# Patient Record
Sex: FEMALE | Race: WHITE | NOT HISPANIC OR LATINO | Employment: OTHER | URBAN - METROPOLITAN AREA
[De-identification: names, ages, dates, MRNs, and addresses within clinical notes are randomized per-mention and may not be internally consistent; named-entity substitution may affect disease eponyms.]

---

## 2017-01-19 ENCOUNTER — ANTICOAGULATION THERAPY VISIT (OUTPATIENT)
Dept: NURSING | Facility: CLINIC | Age: 82
End: 2017-01-19
Payer: COMMERCIAL

## 2017-01-19 ENCOUNTER — TELEPHONE (OUTPATIENT)
Dept: FAMILY MEDICINE | Facility: CLINIC | Age: 82
End: 2017-01-19

## 2017-01-19 DIAGNOSIS — M51.379 DEGENERATION OF LUMBAR OR LUMBOSACRAL INTERVERTEBRAL DISC: Primary | ICD-10-CM

## 2017-01-19 DIAGNOSIS — E03.8 OTHER SPECIFIED HYPOTHYROIDISM: ICD-10-CM

## 2017-01-19 DIAGNOSIS — I26.99 PULMONARY EMBOLISM AND INFARCTION (H): ICD-10-CM

## 2017-01-19 DIAGNOSIS — Z79.01 LONG-TERM (CURRENT) USE OF ANTICOAGULANTS: Primary | ICD-10-CM

## 2017-01-19 LAB — INR POINT OF CARE: 4.2 (ref 0.86–1.14)

## 2017-01-19 PROCEDURE — 99207 ZZC NO CHARGE NURSE ONLY: CPT

## 2017-01-19 PROCEDURE — 85610 PROTHROMBIN TIME: CPT | Mod: QW

## 2017-01-19 PROCEDURE — 36416 COLLJ CAPILLARY BLOOD SPEC: CPT

## 2017-01-19 RX ORDER — LEVOTHYROXINE SODIUM 125 UG/1
62.5 TABLET ORAL DAILY
Qty: 15 TABLET | Refills: 0 | Status: SHIPPED | OUTPATIENT
Start: 2017-01-19 | End: 2017-01-20

## 2017-01-19 RX ORDER — LEVOTHYROXINE SODIUM 125 UG/1
62.5 TABLET ORAL DAILY
Qty: 15 TABLET | Refills: 0 | Status: SHIPPED | OUTPATIENT
Start: 2017-01-19 | End: 2017-01-19

## 2017-01-19 NOTE — TELEPHONE ENCOUNTER
PCP: Please see pended DME Walker Order.  Sign if appropriate.     I left VM on patient's home phone asking what location they would like the DME order to be faxed to.    Sasha Leija RN

## 2017-01-19 NOTE — MR AVS SNAPSHOT
Ham Marie   1/19/2017 3:00 PM   Anticoagulation Therapy Visit    Description:  89 year old female   Provider:   ANTICOAGULATION CLINIC   Department:   Nurse           INR as of 1/19/2017     Selected INR 4.2! (1/19/2017)      Anticoagulation Summary as of 1/19/2017     INR goal 2.0-3.0   Selected INR 4.2! (1/19/2017)   Full instructions 1/19: Hold; Otherwise 5 mg every day   Next INR check 2/2/2017    Indications   Long-term (current) use of anticoagulants [Z79.01] [Z79.01]  Pulmonary embolism and infarction (H) [I26.99]  Pulmonary embolism and infarction (H) (Resolved) [I26.99]         Your next Anticoagulation Clinic appointment(s)     Feb 02, 2017  3:30 PM   Anticoagulation Visit with  ANTICOAGULATION CLINIC   [facility]    [address]  [place] [address]   [phone]              Contact Numbers     Clinic Number:         January 2017 Details    Sun Mon Tue Wed Thu Fri Sat     1               2               3               4               5               6               7                 8               9               10               11               12               13               14                 15               16               17               18               19      Hold   See details      20      5 mg         21      5 mg           22      5 mg         23      5 mg         24      5 mg         25      5 mg         26      5 mg         27      5 mg         28      5 mg           29      5 mg         30      5 mg         31      5 mg              Date Details   01/19 This INR check               How to take your warfarin dose     To take:  5 mg Take 2 of the 2.5 mg tablets.    Hold Do not take your warfarin dose. See the Details table to the right for additional instructions.                February 2017 Details    Sun Mon Tue Wed Thu Fri Sat        1      5 mg         2            3               4                 5               6                7               8               9               10               11                 12               13               14               15               16               17               18                 19               20               21               22               23               24               25                 26               27               28                    Date Details   No additional details    Date of next INR:  2/2/2017         How to take your warfarin dose     To take:  5 mg Take 2 of the 2.5 mg tablets.

## 2017-01-19 NOTE — TELEPHONE ENCOUNTER
Reason for Call: Request for an order or referral:    Order or referral being requested: DME for a Walker    Date needed: within one week    Has the patient been seen by the PCP for this problem? NO    Additional comments: The patient and her family came in today and asked for a walker for the patient    Phone number Patient can be reached at:  Home number on file [phone] (home)    Best Time:  anytime    Can we leave a detailed message on this number?  YES    Call taken on 1/19/2017 at 2:51 PM by Razia North

## 2017-01-19 NOTE — TELEPHONE ENCOUNTER
Pended an order for the type of walker patient is requesting.      Patient has been scheduled with Dr Marie 3pm 2-2-17 (2 wks) followed by RANDY BLOCK---patient hasn't been taking Levothyroxine x 2 wks.  Ran out of the med.  Overdue for thyroid lab testing.  I made a notation in 2-2-17 appointment that patient will need thyroid lab tests.   Sent RX Levothyroxine to patient's pharmacy today so that she can resume the med prior to OV x 2 wks.      Criselda Barfield RN, BSN

## 2017-01-20 RX ORDER — LEVOTHYROXINE SODIUM 125 UG/1
62.5 TABLET ORAL DAILY
Qty: 15 TABLET | Refills: 0 | Status: SHIPPED | OUTPATIENT
Start: 2017-01-20 | End: 2017-02-02

## 2017-01-20 NOTE — TELEPHONE ENCOUNTER
Patient returned call.   Prefers to keep OV with PCP scheduled 2-2-17.    Will  and resume Levothyroxine.   Requests DME for walker be mailed to her home address.  Done.     Criselda Barfield RN, BSN

## 2017-01-20 NOTE — TELEPHONE ENCOUNTER
Called Pt to see if she is interested in coming in today for OV with PCP (he has a couple of openings so far this AM)  Looks like Triage had already left VM previously asking where Pt wants walker DME script faxed to.     Awaiting callback.     Shira Fernandez RN

## 2017-02-02 ENCOUNTER — ANTICOAGULATION THERAPY VISIT (OUTPATIENT)
Dept: NURSING | Facility: CLINIC | Age: 82
End: 2017-02-02
Payer: COMMERCIAL

## 2017-02-02 ENCOUNTER — OFFICE VISIT (OUTPATIENT)
Dept: FAMILY MEDICINE | Facility: CLINIC | Age: 82
End: 2017-02-02
Payer: COMMERCIAL

## 2017-02-02 DIAGNOSIS — I26.99 PULMONARY EMBOLISM AND INFARCTION (H): ICD-10-CM

## 2017-02-02 DIAGNOSIS — Z79.01 LONG-TERM (CURRENT) USE OF ANTICOAGULANTS: ICD-10-CM

## 2017-02-02 DIAGNOSIS — E03.8 OTHER SPECIFIED HYPOTHYROIDISM: Primary | ICD-10-CM

## 2017-02-02 DIAGNOSIS — E55.9 VITAMIN D DEFICIENCY: ICD-10-CM

## 2017-02-02 DIAGNOSIS — F43.22 ADJUSTMENT DISORDER WITH ANXIOUS MOOD: ICD-10-CM

## 2017-02-02 DIAGNOSIS — R35.0 URINARY FREQUENCY: ICD-10-CM

## 2017-02-02 DIAGNOSIS — Z79.01 LONG-TERM (CURRENT) USE OF ANTICOAGULANTS: Primary | ICD-10-CM
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
## 2017-02-02 LAB
BASOPHILS # BLD AUTO: 0 10E9/L (ref 0–0.2)
BASOPHILS NFR BLD AUTO: 0.4 %
DIFFERENTIAL METHOD BLD: ABNORMAL
EOSINOPHIL # BLD AUTO: 0.4 10E9/L (ref 0–0.7)
EOSINOPHIL NFR BLD AUTO: 4.5 %
ERYTHROCYTE [DISTWIDTH] IN BLOOD BY AUTOMATED COUNT: 14.8 % (ref 10–15)
HCT VFR BLD AUTO: 38.9 % (ref 35–47)
HGB BLD-MCNC: 13 G/DL (ref 11.7–15.7)
INR POINT OF CARE: 2.8 (ref 0.86–1.14)
LYMPHOCYTES # BLD AUTO: 2.7 10E9/L (ref 0.8–5.3)
LYMPHOCYTES NFR BLD AUTO: 32.9 %
MCH RBC QN AUTO: 33.2 PG (ref 26.5–33)
MCHC RBC AUTO-ENTMCNC: 33.4 G/DL (ref 31.5–36.5)
MCV RBC AUTO: 100 FL (ref 78–100)
MONOCYTES # BLD AUTO: 0.7 10E9/L (ref 0–1.3)
MONOCYTES NFR BLD AUTO: 8.9 %
NEUTROPHILS # BLD AUTO: 4.4 10E9/L (ref 1.6–8.3)
NEUTROPHILS NFR BLD AUTO: 53.3 %
PLATELET # BLD AUTO: 219 10E9/L (ref 150–450)
RBC # BLD AUTO: 3.91 10E12/L (ref 3.8–5.2)
WBC # BLD AUTO: 8.3 10E9/L (ref 4–11)

## 2017-02-02 PROCEDURE — 85025 COMPLETE CBC W/AUTO DIFF WBC: CPT | Performed by: PREVENTIVE MEDICINE

## 2017-02-02 PROCEDURE — 80053 COMPREHEN METABOLIC PANEL: CPT | Performed by: PREVENTIVE MEDICINE

## 2017-02-02 PROCEDURE — 85610 PROTHROMBIN TIME: CPT | Mod: QW

## 2017-02-02 PROCEDURE — 84443 ASSAY THYROID STIM HORMONE: CPT | Performed by: PREVENTIVE MEDICINE

## 2017-02-02 PROCEDURE — 82306 VITAMIN D 25 HYDROXY: CPT | Performed by: PREVENTIVE MEDICINE

## 2017-02-02 PROCEDURE — 99207 ZZC NO CHARGE NURSE ONLY: CPT

## 2017-02-02 PROCEDURE — 36416 COLLJ CAPILLARY BLOOD SPEC: CPT

## 2017-02-02 PROCEDURE — 99214 OFFICE O/P EST MOD 30 MIN: CPT | Performed by: PREVENTIVE MEDICINE

## 2017-02-02 RX ORDER — MIRABEGRON 50 MG/1
50 TABLET, EXTENDED RELEASE ORAL DAILY
Qty: 90 TABLET | Refills: 3 | Status: SHIPPED | OUTPATIENT
Start: 2017-02-02

## 2017-02-02 RX ORDER — DULOXETIN HYDROCHLORIDE 20 MG/1
CAPSULE, DELAYED RELEASE ORAL
Qty: 180 CAPSULE | Refills: 1 | Status: SHIPPED | OUTPATIENT
Start: 2017-02-02 | End: 2017-02-02

## 2017-02-02 RX ORDER — WARFARIN SODIUM 2.5 MG/1
TABLET ORAL
Qty: 180 TABLET | Refills: 0 | Status: SHIPPED | OUTPATIENT
Start: 2017-02-02 | End: 2017-06-05

## 2017-02-02 RX ORDER — DULOXETIN HYDROCHLORIDE 20 MG/1
20 CAPSULE, DELAYED RELEASE ORAL 2 TIMES DAILY
Qty: 180 CAPSULE | Refills: 3 | Status: SHIPPED | OUTPATIENT
Start: 2017-02-02 | End: 2022-01-01

## 2017-02-02 RX ORDER — MIRABEGRON 25 MG/1
25 TABLET, FILM COATED, EXTENDED RELEASE ORAL DAILY
Qty: 90 TABLET | Refills: 3 | Status: CANCELLED | OUTPATIENT
Start: 2017-02-02

## 2017-02-02 RX ORDER — LEVOTHYROXINE SODIUM 125 UG/1
62.5 TABLET ORAL DAILY
Qty: 90 TABLET | Refills: 3 | Status: SHIPPED | OUTPATIENT
Start: 2017-02-02 | End: 2022-01-01

## 2017-02-02 RX ORDER — LEVOTHYROXINE SODIUM 125 UG/1
62.5 TABLET ORAL DAILY
Qty: 30 TABLET | Refills: 3 | Status: SHIPPED | OUTPATIENT
Start: 2017-02-02 | End: 2017-02-02

## 2017-02-02 NOTE — MR AVS SNAPSHOT
Ham Marie   2/2/2017 3:30 PM   Anticoagulation Therapy Visit    Description:  89 year old female   Provider:   ANTICOAGULATION CLINIC   Department:  [facility] Nurse           INR as of 2/2/2017     Selected INR 2.8 (2/2/2017)      Anticoagulation Summary as of 2/2/2017     INR goal 2.0-3.0   Selected INR 2.8 (2/2/2017)   Full instructions 5 mg every day   Next INR check 3/16/2017    Indications   Long-term (current) use of anticoagulants [Z79.01] [Z79.01]  Pulmonary embolism and infarction (H) [I26.99]  Pulmonary embolism and infarction (H) (Resolved) [I26.99]         Your next Anticoagulation Clinic appointment(s)     Mar 16, 2017 11:30 AM   Anticoagulation Visit with  ANTICOAGULATION CLINIC   [facility] ([facility]    [address]  [place] [address]   [phone]              Contact Numbers     Clinic Number:         February 2017 Details    Sun Mon Tue Wed Thu Fri Sat        1               2      5 mg   See details      3      5 mg         4      5 mg           5      5 mg         6      5 mg         7      5 mg         8      5 mg         9      5 mg         10      5 mg         11      5 mg           12      5 mg         13      5 mg         14      5 mg         15      5 mg         16      5 mg         17      5 mg         18      5 mg           19      5 mg         20      5 mg         21      5 mg         22      5 mg         23      5 mg         24      5 mg         25      5 mg           26      5 mg         27      5 mg         28      5 mg              Date Details   02/02 This INR check               How to take your warfarin dose     To take:  5 mg Take 2 of the 2.5 mg tablets.           March 2017 Details    Sun Mon Tue Wed Thu Fri Sat        1      5 mg         2      5 mg         3      5 mg         4      5 mg           5      5 mg         6      5 mg         7      5 mg         8      5 mg         9      5 mg         10      5 mg         11      5 mg            12      5 mg         13      5 mg         14      5 mg         15      5 mg         16            17               18                 19               20               21               22               23               24               25                 26               27               28               29               30               31                 Date Details   No additional details    Date of next INR:  3/16/2017         How to take your warfarin dose     To take:  5 mg Take 2 of the 2.5 mg tablets.

## 2017-02-02 NOTE — NURSING NOTE
"Chief Complaint   Patient presents with     Hyperlipidemia       Initial /103 mmHg  Pulse 90  Temp(Src) 97.6  F (36.4  C) (Tympanic)  Ht 5' 4\" (1.626 m)  Wt 150 lb (68.04 kg)  BMI 25.73 kg/m2  SpO2 97%  Breastfeeding? No Estimated body mass index is 25.73 kg/(m^2) as calculated from the following:    Height as of this encounter: 5' 4\" (1.626 m).    Weight as of this encounter: 150 lb (68.04 kg).  BP completed using cuff size: regular left arm  Marianne Trinity- CMA      "

## 2017-02-02 NOTE — MR AVS SNAPSHOT
"              After Visit Summary   2/2/2017    Ham Marie    MRN: [id number]           Patient Information     Date Of Birth          2/27/1921        Visit Information        Provider Department      2/2/2017 3:00 PM Cuba Marie MD [facility]        Today's Diagnoses     Other specified hypothyroidism    -  1     Long-term (current) use of anticoagulants [Z79.01]         DVT (Deep Venous Thrombosis)-RT leg,2000         Adjustment disorder with anxious mood         Urinary frequency            Follow-ups after your visit        Your next 10 appointments already scheduled     Feb 02, 2017  3:30 PM   Anticoagulation Visit with  ANTICOAGULATION CLINIC   [facility] ([facility]    [address]  [facility] [address] [phone]              Who to contact     If you have questions or need follow up information about today's clinic visit or your schedule please contact [facility] directly at [phone].  Normal or non-critical lab and imaging results will be communicated to you by MyChart, letter or phone within 4 business days after the clinic has received the results. If you do not hear from us within 7 days, please contact the clinic through [facility] or phone. If you have a critical or abnormal lab result, we will notify you by phone as soon as possible.  Submit refill requests through [employer] or call your pharmacy and they will forward the refill request to us. Please allow 3 business days for your refill to be completed.          Additional Information About Your Visit        [facility] Information     [employer] lets you send messages to your doctor, view your test results, renew your prescriptions, schedule appointments and more. To sign up, go to [email or web address] . Click on \"Log in\" on the left side of the screen, which will take you to the Welcome page. Then click on \"Sign up Now\" on the right side of the page.     You will " "be asked to enter the access code listed below, as well as some personal information. Please follow the directions to create your username and password.     Your access code is: [email or web address]  Expires: 5/3/2017  3:00 PM     Your access code will  in 90 days. If you need help or a new code, please call your [facility] or [phone].        Care EveryWhere ID     This is your Care EveryWhere ID. This could be used by other organizations to access your [facility] medical records  [id number]        Your Vitals Were     Pulse Temperature Height BMI (Body Mass Index) Pulse Oximetry Breastfeeding?    90 97.6  F (36.4  C) (Tympanic) 5' 4\" (1.626 m) 25.73 kg/m2 97% No       Blood Pressure from Last 3 Encounters:   17 159/103   16 130/86   12/05/15 136/74    Weight from Last 3 Encounters:   17 150 lb (68.04 kg)   16 154 lb (69.854 kg)   12/05/15 155 lb (70.308 kg)              Today, you had the following     No orders found for display       Primary Care Provider Office Phone # Fax #    Brink Michael Marie [phone]       [facility] [address] [facility] [address]  [facility] [address]        Thank you!     Thank you for choosing [facility]  for your care. Our goal is always to provide you with excellent care. Hearing back from our patients is one way we can continue to improve our services. Please take a few minutes to complete the written survey that you may receive in the mail after your visit with us. Thank you!             Your Updated Medication List - Protect others around you: Learn how to safely use, store and throw away your medicines at www.disposemymeds.org.          This list is accurate as of: 17  3:07 PM.  Always use your most recent med list.                   Brand Name Dispense Instructions for use    acetaminophen 500 MG tablet    TYLENOL    100 tablet    Take 2 tablets (1,000 mg) by mouth 3 times daily       calcium " carbonate 1250 (500 CA) MG Tabs tablet    OSCAL 500     Take 1 tablet (1,250 mg) by mouth 2 times daily       DULoxetine 20 MG EC capsule    CYMBALTA    180 capsule    20 mg daily for one week then 20 mg bid ongoing.       GLUCOSAMINE CHONDR COMPLEX PO      Take 2 tablets by mouth daily.       JANTOVEN 2.5 MG tablet   Generic drug:  warfarin     180 tablet    TAKE TWO TABLETS BY MOUTH EVERY DAY OR AS DIRECTED BY INR CLINIC       levothyroxine 125 MCG tablet    SYNTHROID/LEVOTHROID    15 tablet    Take 0.5 tablets (62.5 mcg) by mouth daily       magnesium 250 MG tablet      Take 1 tablet by mouth daily.       mirabegron 25 MG 24 hr tablet    MYRBETRIQ    90 tablet    Take 1 tablet (25 mg) by mouth daily       MULTIVITAMIN TABS   OR      1 po qd       OMEGA 3 PO      Take 2 capsules by mouth daily.       order for DME     1 each    Equipment being ordered: walker with 4 wheels and seat.       PRESERVISION AREDS PO      Take 1 tablet by mouth 2 times daily       VITAMIN C CR PO      Take  by mouth.

## 2017-02-02 NOTE — PROGRESS NOTES
ANTICOAGULATION FOLLOW-UP CLINIC VISIT    Patient Name:  Ham Marie  Date:  2/2/2017  Contact Type:  Face to Face    SUBJECTIVE:        OBJECTIVE    INR PROTIME   Date Value Ref Range Status   02/02/2017 2.8* 0.86 - 1.14 Final       ASSESSMENT / PLAN  INR assessment THER    Recheck INR In: 6 WEEKS    INR Location Clinic      Anticoagulation Summary as of 2/2/2017     INR goal 2.0-3.0   Selected INR 2.8 (2/2/2017)   Maintenance plan 5 mg (2.5 mg x 2) every day   Full instructions 5 mg every day   Weekly total 35 mg   No change documented Criselda Barfield RN   Plan last modified Criselda Barfield RN (3/24/2016)   Next INR check 3/16/2017   Priority INR   Target end date     Indications   Long-term (current) use of anticoagulants [Z79.01] [Z79.01]  Pulmonary embolism and infarction (H) [I26.99]  Pulmonary embolism and infarction (H) (Resolved) [I26.99]         Anticoagulation Episode Summary     INR check location     Preferred lab     Send INR reminders to [facility]    Comments       Anticoagulation Care Providers     Provider Role Specialty Phone number    Cuba Marie MD [facility] Internal Medicine [phone]            See the Encounter Report to view Anticoagulation Flowsheet and Dosing Calendar (Go to Encounters tab in chart review, and find the Anticoagulation Therapy Visit)    Dosage adjustment made based on physician directed care plan.    Criselda Barfield RN

## 2017-02-02 NOTE — Clinical Note
[address]  [place] [address]  [phone]                                                                                               Date: 2/3/2017    Ham Marie                                                                               [address] [place] [address] [address] [place] [address]              Dear Ham,    Your labs all look fine.   Enclosed is a copy of your results.      It was a pleasure to see you at your last appointment. If you have any questions, please feel free to call myself or my nurse at [phone].          Sincerely,    Fran Marie MD/ Alexandrea ALEXANDRE CMA  Results for orders placed or performed in visit on 02/02/17   TSH with free T4 reflex   Result Value Ref Range    TSH 1.91 0.40 - 4.00 mU/L   CBC with platelets differential   Result Value Ref Range    WBC 8.3 4.0 - 11.0 10e9/L    RBC Count 3.91 3.8 - 5.2 10e12/L    Hemoglobin 13.0 11.7 - 15.7 g/dL    Hematocrit 38.9 35.0 - 47.0 %     78 - 100 fl    MCH 33.2 (H) 26.5 - 33.0 pg    MCHC 33.4 31.5 - 36.5 g/dL    RDW 14.8 10.0 - 15.0 %    Platelet Count 219 150 - 450 10e9/L    Diff Method Automated Method     % Neutrophils 53.3 %    % Lymphocytes 32.9 %    % Monocytes 8.9 %    % Eosinophils 4.5 %    % Basophils 0.4 %    Absolute Neutrophil 4.4 1.6 - 8.3 10e9/L    Absolute Lymphocytes 2.7 0.8 - 5.3 10e9/L    Absolute Monocytes 0.7 0.0 - 1.3 10e9/L    Absolute Eosinophils 0.4 0.0 - 0.7 10e9/L    Absolute Basophils 0.0 0.0 - 0.2 10e9/L   Comprehensive metabolic panel   Result Value Ref Range    Sodium 142 133 - 144 mmol/L    Potassium 5.2 3.4 - 5.3 mmol/L    Chloride 108 94 - 109 mmol/L    Carbon Dioxide 28 20 - 32 mmol/L    Anion Gap 6 3 - 14 mmol/L    Glucose 89 70 - 99 mg/dL    Urea Nitrogen 21 7 - 30 mg/dL    Creatinine 0.80 0.52 - 1.04 mg/dL    GFR Estimate 67 >60 mL/min/1.7m2    GFR Estimate If Black 81 >60 mL/min/1.7m2    Calcium 9.6 8.5 - 10.1 mg/dL    Bilirubin Total 0.5 0.2 - 1.3  mg/dL    Albumin 3.6 3.4 - 5.0 g/dL    Protein Total 7.5 6.8 - 8.8 g/dL    Alkaline Phosphatase 68 40 - 150 U/L    ALT 18 0 - 50 U/L    AST 21 0 - 45 U/L   Vitamin D Deficiency   Result Value Ref Range    Vitamin D Deficiency screening 56 20 - 75 ug/L

## 2017-02-03 LAB
ALBUMIN SERPL-MCNC: 3.6 G/DL (ref 3.4–5)
ALP SERPL-CCNC: 68 U/L (ref 40–150)
ALT SERPL W P-5'-P-CCNC: 18 U/L (ref 0–50)
ANION GAP SERPL CALCULATED.3IONS-SCNC: 6 MMOL/L (ref 3–14)
AST SERPL W P-5'-P-CCNC: 21 U/L (ref 0–45)
BILIRUB SERPL-MCNC: 0.5 MG/DL (ref 0.2–1.3)
BUN SERPL-MCNC: 21 MG/DL (ref 7–30)
CALCIUM SERPL-MCNC: 9.6 MG/DL (ref 8.5–10.1)
CHLORIDE SERPL-SCNC: 108 MMOL/L (ref 94–109)
CO2 SERPL-SCNC: 28 MMOL/L (ref 20–32)
CREAT SERPL-MCNC: 0.8 MG/DL (ref 0.52–1.04)
DEPRECATED CALCIDIOL+CALCIFEROL SERPL-MC: 56 UG/L (ref 20–75)
GFR SERPL CREATININE-BSD FRML MDRD: 67 ML/MIN/1.7M2
GLUCOSE SERPL-MCNC: 89 MG/DL (ref 70–99)
POTASSIUM SERPL-SCNC: 5.2 MMOL/L (ref 3.4–5.3)
PROT SERPL-MCNC: 7.5 G/DL (ref 6.8–8.8)
SODIUM SERPL-SCNC: 142 MMOL/L (ref 133–144)
TSH SERPL DL<=0.005 MIU/L-ACNC: 1.91 MU/L (ref 0.4–4)

## 2017-02-05 VITALS
SYSTOLIC BLOOD PRESSURE: 130 MMHG | DIASTOLIC BLOOD PRESSURE: 80 MMHG | TEMPERATURE: 97.6 F | BODY MASS INDEX: 25.61 KG/M2 | HEIGHT: 64 IN | HEART RATE: 90 BPM | OXYGEN SATURATION: 97 % | WEIGHT: 150 LBS

## 2017-02-05 NOTE — PROGRESS NOTES
"SUBJECTIVE:  Ham Marie, a 89 year old female scheduled an appointment to discuss the following issues:     Other specified hypothyroidism  Long-term (current) use of anticoagulants  Adjustment disorder with anxious mood  Urinary frequency  Fall, sequela  Vitamin D deficiency  Pt has had 2 mechanical falls in past couple months at home, not using walker  Also [facility]    Medical, social, surgical, and family histories reviewed.    ROS:  C: NEGATIVE for fever, chills  E: NEGATIVE for vision changes   R: NEGATIVE for significant cough or SOB  CV: NEGATIVE for chest pain, palpitations   GI: NEGATIVE for nausea, abdominal pain, heartburn, or change in bowel habits  : NEGATIVE for frequency, dysuria, or hematuria  M: NEGATIVE for significant arthralgias or myalgia  N: NEGATIVE for weakness, dizziness or paresthesias or headache    OBJECTIVE:  /103 mmHg  Pulse 90  Temp(Src) 97.6  F (36.4  C) (Tympanic)  Ht 5' 4\" (1.626 m)  Wt 150 lb (68.04 kg)  BMI 25.73 kg/m2  SpO2 97%  Breastfeeding? No  EXAM:  GENERAL APPEARANCE: healthy, alert and no distress  EYES: EOMI,  PERRL  HENT: ear canals and TM's normal and nose and mouth without ulcers or lesions  RESP: lungs clear to auscultation - no rales, rhonchi or wheezes  CV: regular rates and rhythm, normal S1 S2, no S3 or S4 and no murmur, click or rub -  ABDOMEN:  soft, nontender, no HSM or masses and bowel sounds normal    ASSESSMENT/PLAN:  (E03.8) Other specified hypothyroidism  (primary encounter diagnosis)  Plan: TSH with free T4 reflex, levothyroxine         (SYNTHROID/LEVOTHROID) 125 MCG tablet,     (Z79.01) Long-term (current) use of anticoagulants [Z79.01]  Plan: warfarin (JANTOVEN) 2.5 MG tablet, CBC with         platelets differential, Comprehensive metabolic        panel    (F43.22) Adjustment disorder with anxious mood  Plan: DULoxetine (CYMBALTA) 20 MG EC capsule,         cholecalciferol (VITAMIN D) " 1000 UNIT tablet,     (R35.0) Urinary frequency  Plan: mirabegron (MYRBETRIQ) 50 MG 24 hr tablet, UA         reflex to Microscopic and Culture  Increase mirabegron to 50 mg daily, recheck ua    (W19.XXXS) Fall, sequela  Advise always using walker, regular vitamin d 2000 IU daily supplementation  Advise PT, declined by pt    (E55.9) Vitamin D deficiency  Plan: Vitamin D Deficiency    25 minutes spent with patient, over 50% time counseling, coordinating care and explaining about nature of the patient's conditions.    All risks, benefits of treatment and further evaluation was reviewed with patient.  Pt expressed understanding.  Pt was in agreement with this plan.  Cuba Marie MD

## 2017-03-16 ENCOUNTER — ANTICOAGULATION THERAPY VISIT (OUTPATIENT)
Dept: NURSING | Facility: CLINIC | Age: 82
End: 2017-03-16
Payer: COMMERCIAL

## 2017-03-16 DIAGNOSIS — I26.99 PULMONARY EMBOLISM AND INFARCTION (H): ICD-10-CM

## 2017-03-16 DIAGNOSIS — Z79.01 LONG-TERM (CURRENT) USE OF ANTICOAGULANTS: ICD-10-CM

## 2017-03-16 LAB — INR POINT OF CARE: 3.3 (ref 0.86–1.14)

## 2017-03-16 PROCEDURE — 36416 COLLJ CAPILLARY BLOOD SPEC: CPT

## 2017-03-16 PROCEDURE — 99207 ZZC NO CHARGE NURSE ONLY: CPT

## 2017-03-16 PROCEDURE — 85610 PROTHROMBIN TIME: CPT | Mod: QW

## 2017-03-16 NOTE — MR AVS SNAPSHOT
Ham Marie   3/16/2017 11:30 AM   Anticoagulation Therapy Visit    Description:  89 year old female   Provider:  [facility]   Department:  [facility]           INR as of 3/16/2017     Today's INR 3.3!      Anticoagulation Summary as of 3/16/2017     INR goal 2.0-3.0   Today's INR 3.3!   Full instructions 3/16: 2.5 mg; Otherwise 5 mg every day   Next INR check 4/27/2017    Indications   Long-term (current) use of anticoagulants [Z79.01] [Z79.01]  Pulmonary embolism and infarction (H) [I26.99]  Pulmonary embolism and infarction (H) (Resolved) [I26.99]         Contact Numbers     Clinic Number:         March 2017 Details    Sun Mon Tue Wed Thu Fri Sat        1               2               3               4                 5               6               7               8               9               10               11                 12               13               14               15               16      2.5 mg   See details      17      5 mg         18      5 mg           19      5 mg         20      5 mg         21      5 mg         22      5 mg         23      5 mg         24      5 mg         25      5 mg           26      5 mg         27      5 mg         28      5 mg         29      5 mg         30      5 mg         31      5 mg           Date Details   03/16 This INR check               How to take your warfarin dose     To take:  2.5 mg Take 1 of the 2.5 mg tablets.    To take:  5 mg Take 2 of the 2.5 mg tablets.           April 2017 Details    Sun Mon Tue Wed Thu Fri Sat           1      5 mg           2      5 mg         3      5 mg         4      5 mg         5      5 mg         6      5 mg         7      5 mg         8      5 mg           9      5 mg         10      5 mg         11      5 mg         12      5 mg         13      5 mg         14      5 mg         15      5 mg           16      5 mg         17      5 mg         18      5 mg         19      5 mg         20       5 mg         21      5 mg         22      5 mg           23      5 mg         24      5 mg         25      5 mg         26      5 mg         27            28               29                 30                      Date Details   No additional details    Date of next INR:  4/27/2017         How to take your warfarin dose     To take:  5 mg Take 2 of the 2.5 mg tablets.

## 2017-03-16 NOTE — PROGRESS NOTES
ANTICOAGULATION FOLLOW-UP CLINIC VISIT    Patient Name:  Ham Marie  Date:  3/16/2017  Contact Type:  Face to Face    SUBJECTIVE:     Patient Findings     Positives Unexplained INR or factor level change           OBJECTIVE    INR Protime   Date Value Ref Range Status   03/16/2017 3.3 (A) 0.86 - 1.14 Final       ASSESSMENT / PLAN  INR assessment SUPRA    Recheck INR In: 6 WEEKS    INR Location Clinic      Anticoagulation Summary as of 3/16/2017     INR goal 2.0-3.0   Today's INR 3.3!   Maintenance plan 5 mg (2.5 mg x 2) every day   Full instructions 3/16: 2.5 mg; Otherwise 5 mg every day   Weekly total 35 mg   Plan last modified Criselda Barfield RN (3/24/2016)   Next INR check 4/27/2017   Priority INR   Target end date     Indications   Long-term (current) use of anticoagulants [Z79.01] [Z79.01]  Pulmonary embolism and infarction (H) [I26.99]  Pulmonary embolism and infarction (H) (Resolved) [I26.99]         Anticoagulation Episode Summary     INR check location     Preferred lab     Send INR reminders to [facility]    Comments       Anticoagulation Care Providers     Provider Role Specialty Phone number    Cuba Marie Michael Willingham MD Responsible Internal Medicine [phone]            See the Encounter Report to view Anticoagulation Flowsheet and Dosing Calendar (Go to Encounters tab in chart review, and find the Anticoagulation Therapy Visit)    Dosage adjustment made based on physician directed care plan.  Patient doesn't want to make next INR appointment at this time due to transportation concerns.  Agrees to be seen for INR sometime the last week of April.     Criselda Barfield RN

## 2017-05-12 ENCOUNTER — TELEPHONE (OUTPATIENT)
Dept: NURSING | Facility: CLINIC | Age: 82
End: 2017-05-12

## 2017-05-12 NOTE — TELEPHONE ENCOUNTER
Patient overdue for INR recheck.    Last INR 3/16/17    Called and spoke with patient and advised INR appointment.   Patient states she doesn't plan to come in for that at this time and might end up looking for a new provider since Dr Marie leaving the clinic.      Advised patient to have INR checked very soon---either with our clinic or with new provider, if she decides to change clinics.     Patient stated understanding.     Criselda Barfield RN, BSN

## 2017-06-05 ENCOUNTER — ANTICOAGULATION THERAPY VISIT (OUTPATIENT)
Dept: NURSING | Facility: CLINIC | Age: 82
End: 2017-06-05
Payer: COMMERCIAL

## 2017-06-05 DIAGNOSIS — I26.99 PULMONARY EMBOLISM AND INFARCTION (H): ICD-10-CM

## 2017-06-05 DIAGNOSIS — Z79.01 LONG-TERM (CURRENT) USE OF ANTICOAGULANTS: ICD-10-CM

## 2017-06-05 LAB — INR POINT OF CARE: 3 (ref 0.86–1.14)

## 2017-06-05 PROCEDURE — 85610 PROTHROMBIN TIME: CPT | Mod: QW

## 2017-06-05 PROCEDURE — 99207 ZZC NO CHARGE NURSE ONLY: CPT

## 2017-06-05 PROCEDURE — 36416 COLLJ CAPILLARY BLOOD SPEC: CPT

## 2017-06-05 RX ORDER — WARFARIN SODIUM 2.5 MG/1
TABLET ORAL
Qty: 180 TABLET | Refills: 0 | Status: SHIPPED | OUTPATIENT
Start: 2017-06-05 | End: 2017-09-12

## 2017-06-05 NOTE — MR AVS SNAPSHOT
Ham Marie   6/5/2017 2:15 PM   Anticoagulation Therapy Visit    Description:  89 year old female   Provider:   [facility]ICO[facility]ULATION CLINIC   Department:  [facility]           INR as of 6/5/2017     Today's INR 3.0      Anticoagulation Summary as of 6/5/2017     INR goal 2.0-3.0   Today's INR 3.0   Full instructions 5 mg every day   Next INR check 7/31/2017    Indications   Long-term (current) use of anticoagulants [Z79.01] [Z79.01]  Pulmonary embolism and infarction (H) [I26.99]  Pulmonary embolism and infarction (H) (Resolved) [I26.99]         Your next Anticoagulation Clinic appointment(s)     Jul 31, 2017  1:45 PM CDT   Anticoagulation Visit with  ANTICOAGULATION CLINIC   [facility] [place] ([facility])    [address]  [place] [address]   [phone]              Contact Numbers     Clinic Number:         June 2017 Details    Sun Mon Tue Wed Thu Fri Sat         1               2               3                 4               5      5 mg   See details      6      5 mg         7      5 mg         8      5 mg         9      5 mg         10      5 mg           11      5 mg         12      5 mg         13      5 mg         14      5 mg         15      5 mg         16      5 mg         17      5 mg           18      5 mg         19      5 mg         20      5 mg         21      5 mg         22      5 mg         23      5 mg         24      5 mg           25      5 mg         26      5 mg         27      5 mg         28      5 mg         29      5 mg         30      5 mg           Date Details   06/05 This INR check               How to take your warfarin dose     To take:  5 mg Take 2 of the 2.5 mg tablets.           July 2017 Details    Sun Mon Tue Wed Thu Fri Sat           1      5 mg           2      5 mg         3      5 mg         4      5 mg         5      5 mg         6      5 mg         7      5 mg         8      5 mg           9      5 mg         10      5 mg         11       5 mg         12      5 mg         13      5 mg         14      5 mg         15      5 mg           16      5 mg         17      5 mg         18      5 mg         19      5 mg         20      5 mg         21      5 mg         22      5 mg           23      5 mg         24      5 mg         25      5 mg         26      5 mg         27      5 mg         28      5 mg         29      5 mg           30      5 mg         31                  Date Details   No additional details    Date of next INR:  7/31/2017         How to take your warfarin dose     To take:  5 mg Take 2 of the 2.5 mg tablets.

## 2017-07-31 ENCOUNTER — ANTICOAGULATION THERAPY VISIT (OUTPATIENT)
Dept: NURSING | Facility: CLINIC | Age: 82
End: 2017-07-31
Payer: COMMERCIAL

## 2017-07-31 ENCOUNTER — ANTICOAGULATION THERAPY VISIT (OUTPATIENT)
Dept: FAMILY MEDICINE | Facility: CLINIC | Age: 82
End: 2017-07-31

## 2017-07-31 DIAGNOSIS — Z79.01 LONG-TERM (CURRENT) USE OF ANTICOAGULANTS: ICD-10-CM

## 2017-07-31 DIAGNOSIS — I26.99 PULMONARY EMBOLISM AND INFARCTION (H): ICD-10-CM

## 2017-07-31 LAB — INR POINT OF CARE: 3.1 (ref 0.86–1.14)

## 2017-07-31 PROCEDURE — 99207 ZZC NO CHARGE NURSE ONLY: CPT

## 2017-07-31 PROCEDURE — 85610 PROTHROMBIN TIME: CPT | Mod: QW

## 2017-07-31 PROCEDURE — 36416 COLLJ CAPILLARY BLOOD SPEC: CPT

## 2017-07-31 NOTE — MR AVS SNAPSHOT
Ham Marie   7/31/2017 1:45 PM   Anticoagulation Therapy Visit    Description:  89 year old female   Provider:  [facility]   Department:  [facility]           INR as of 7/31/2017     Today's INR 3.1!      Anticoagulation Summary as of 7/31/2017     INR goal 2.0-3.0   Today's INR 3.1!   Full instructions 5 mg every day   Next INR check 9/25/2017    Indications   Long-term (current) use of anticoagulants [Z79.01] [Z79.01]  Pulmonary embolism and infarction (H) [I26.99]  Pulmonary embolism and infarction (H) (Resolved) [I26.99]         Contact Numbers     Clinic Number:         July 2017 Details    Sun Mon Tue Wed Thu Fri Sat           1                 2               3               4               5               6               7               8                 9               10               11               12               13               14               15                 16               17               18               19               20               21               22                 23               24               25               26               27               28               29                 30               31      5 mg   See details            Date Details   07/31 This INR check               How to take your warfarin dose     To take:  5 mg Take 2 of the 2.5 mg tablets.           August 2017 Details    Sun Mon Tue Wed Thu Fri Sat       1      5 mg         2      5 mg         3      5 mg         4      5 mg         5      5 mg           6      5 mg         7      5 mg         8      5 mg         9      5 mg         10      5 mg         11      5 mg         12      5 mg           13      5 mg         14      5 mg         15      5 mg         16      5 mg         17      5 mg         18      5 mg         19      5 mg           20      5 mg         21      5 mg         22      5 mg         23      5 mg         24      5 mg         25      5 mg         26      5 mg            27      5 mg         28      5 mg         29      5 mg         30      5 mg         31      5 mg            Date Details   No additional details            How to take your warfarin dose     To take:  5 mg Take 2 of the 2.5 mg tablets.           September 2017 Details    Sun Mon Tue Wed Thu Fri Sat          1      5 mg         2      5 mg           3      5 mg         4      5 mg         5      5 mg         6      5 mg         7      5 mg         8      5 mg         9      5 mg           10      5 mg         11      5 mg         12      5 mg         13      5 mg         14      5 mg         15      5 mg         16      5 mg           17      5 mg         18      5 mg         19      5 mg         20      5 mg         21      5 mg         22      5 mg         23      5 mg           24      5 mg         25            26               27               28               29               30                Date Details   No additional details    Date of next INR:  9/25/2017         How to take your warfarin dose     To take:  5 mg Take 2 of the 2.5 mg tablets.

## 2017-07-31 NOTE — PROGRESS NOTES
ANTICOAGULATION FOLLOW-UP CLINIC VISIT    Patient Name:  Ham Marie  Date:  7/31/2017  Contact Type:  Face to Face    SUBJECTIVE:     Patient Findings     Positives No Problem Findings           OBJECTIVE    INR Protime   Date Value Ref Range Status   07/31/2017 3.1 (A) 0.86 - 1.14 Final       ASSESSMENT / PLAN  INR assessment THER    Recheck INR In: 8 WEEKS    INR Location Clinic      Anticoagulation Summary as of 7/31/2017     INR goal 2.0-3.0   Today's INR 3.1!   Maintenance plan 5 mg (2.5 mg x 2) every day   Full instructions 5 mg every day   Weekly total 35 mg   No change documented Criselda Barfield, LEENA   Plan last modified Criselda Barfield RN (3/24/2016)   Next INR check 9/25/2017   Priority INR   Target end date     Indications   Long-term (current) use of anticoagulants [Z79.01] [Z79.01]  Pulmonary embolism and infarction (H) [I26.99]  Pulmonary embolism and infarction (H) (Resolved) [I26.99]         Anticoagulation Episode Summary     INR check location     Preferred lab     Send INR reminders to [facility]    Comments       Anticoagulation Care Providers     Provider Role Specialty Phone number    Frank Cuba Willingham MD Responsible Internal Medicine [phone]            See the Encounter Report to view Anticoagulation Flowsheet and Dosing Calendar (Go to Encounters tab in chart review, and find the Anticoagulation Therapy Visit)    Dosage adjustment made based on physician directed care plan.  Patient changing clinics.  Will have INR managed at new clinic.      Criselda Barfield RN

## 2017-09-12 DIAGNOSIS — Z79.01 LONG-TERM (CURRENT) USE OF ANTICOAGULANTS: ICD-10-CM

## 2017-09-12 NOTE — TELEPHONE ENCOUNTER
TC's please contact patient to est care with new PCP      warfarin (MICHAELTOVEN) 2.5 MG tablet      Last Written Prescription Date: 6/5/2017  Last Fill Qty: 180, # refills: 0  Last Office Visit with [facility], P or [facility] prescribing provider: 2/2/2017       Date and Result of Last PT/INR:   Lab Results   Component Value Date    INR 3.1 07/31/2017    INR 3.0 06/05/2017    INR 4.00 06/20/2016    INR 2.10 07/13/2015

## 2017-09-18 RX ORDER — WARFARIN SODIUM 2.5 MG/1
TABLET ORAL
Qty: 180 TABLET | Refills: 0 | Status: SHIPPED | OUTPATIENT
Start: 2017-09-18 | End: 2017-09-18

## 2017-09-18 RX ORDER — WARFARIN SODIUM 2.5 MG/1
TABLET ORAL
Qty: 60 TABLET | Refills: 0 | Status: SHIPPED | OUTPATIENT
Start: 2017-09-18 | End: 2022-01-01

## 2017-09-18 NOTE — TELEPHONE ENCOUNTER
Prescription approved per [facility] Refill Protocol.  Ruth Wasserman RN

## 2017-11-09 ENCOUNTER — APPOINTMENT (OUTPATIENT)
Age: 82
Setting detail: DERMATOLOGY
End: 2017-11-10

## 2017-11-09 DIAGNOSIS — L82.0 INFLAMED SEBORRHEIC KERATOSIS: ICD-10-CM

## 2017-11-09 DIAGNOSIS — L89 PRESSURE ULCER: ICD-10-CM

## 2017-11-09 PROBLEM — L89.321 PRESSURE ULCER OF LEFT BUTTOCK, STAGE 1: Status: ACTIVE | Noted: 2017-11-09

## 2017-11-09 PROCEDURE — OTHER BENIGN DESTRUCTION: OTHER

## 2017-11-09 PROCEDURE — 99202 OFFICE O/P NEW SF 15 MIN: CPT | Mod: 25

## 2017-11-09 PROCEDURE — OTHER MIPS QUALITY: OTHER

## 2017-11-09 PROCEDURE — OTHER COUNSELING: OTHER

## 2017-11-09 PROCEDURE — 17110 DESTRUCT B9 LESION 1-14: CPT

## 2017-11-09 PROCEDURE — OTHER LIQUID NITROGEN: OTHER

## 2017-11-09 ASSESSMENT — LOCATION DETAILED DESCRIPTION DERM
LOCATION DETAILED: RIGHT SUPERIOR LATERAL NECK
LOCATION DETAILED: LEFT BUTTOCK
LOCATION DETAILED: LEFT LATERAL CANTHUS
LOCATION DETAILED: LEFT CENTRAL LATERAL NECK

## 2017-11-09 ASSESSMENT — LOCATION ZONE DERM
LOCATION ZONE: EYELID
LOCATION ZONE: TRUNK
LOCATION ZONE: NECK

## 2017-11-09 ASSESSMENT — LOCATION SIMPLE DESCRIPTION DERM
LOCATION SIMPLE: LEFT EYELID
LOCATION SIMPLE: NECK
LOCATION SIMPLE: LEFT BUTTOCK
LOCATION SIMPLE: RIGHT ANTERIOR NECK

## 2017-11-09 ASSESSMENT — SEVERITY ASSESSMENT: SEVERITY: MILD

## 2017-11-09 NOTE — PROCEDURE: BENIGN DESTRUCTION
Add 52 Modifier (Optional): no
Medical Necessity Information: It is in your best interest to select a reason for this procedure from the list below. All of these items fulfill various CMS LCD requirements except the new and changing color options.
Anesthesia Volume In Cc: 0.3
Detail Level: Detailed
Treatment Number (Will Not Render If 0): 0
Post-Care Instructions: I reviewed with the patient in detail post-care instructions. Patient is to wear sunprotection, and avoid picking at any of the treated lesions. Pt may apply Vaseline to crusted or scabbing areas.
Render Post-Care Instructions In Note?: yes
Medical Necessity Clause: This procedure was medically necessary because the lesions that were treated were:
Consent: The patient's consent was obtained including but not limited to risks of crusting, scabbing, blistering, scarring, darker or lighter pigmentary change, recurrence, incomplete removal and infection.

## 2017-11-09 NOTE — PROCEDURE: LIQUID NITROGEN
Post-Care Instructions: I reviewed with the patient in detail post-care instructions. Patient is to wear sunprotection, and avoid picking at any of the treated lesions. Pt may apply Vaseline to crusted or scabbing areas.
Detail Level: Detailed
Number Of Freeze-Thaw Cycles: 1 freeze-thaw cycle
Render Post Care In The Note?: yes
Add 52 Modifier (Optional): no
Medical Necessity Clause: This procedure was medically necessary because the lesions that were treated were:
Medical Necessity Information: It is in your best interest to select a reason for this procedure from the list below. All of these items fulfill various CMS LCD requirements except the new and changing color options.
Total Number Of Lesions Treated: 2
Duration Of Freeze Thaw-Cycle (Seconds): 15
Consent: The patient's verbal consent was obtained including but not limited to risks of crusting, scabbing, blistering, scarring, darker or lighter pigmentary change, recurrence, incomplete removal and infection.

## 2018-01-19 DIAGNOSIS — Z79.01 LONG-TERM (CURRENT) USE OF ANTICOAGULANTS: ICD-10-CM

## 2018-01-23 RX ORDER — WARFARIN SODIUM 2.5 MG/1
TABLET ORAL
Start: 2018-01-23

## 2018-01-29 ENCOUNTER — APPOINTMENT (OUTPATIENT)
Age: 83
Setting detail: DERMATOLOGY
End: 2018-02-12

## 2018-01-29 DIAGNOSIS — I83009 VARICOSE VEINS OF LOWER EXTREMITIES WITH ULCER: ICD-10-CM

## 2018-01-29 DIAGNOSIS — I83029 VARICOSE VEINS OF LOWER EXTREMITIES WITH ULCER: ICD-10-CM

## 2018-01-29 DIAGNOSIS — I83019 VARICOSE VEINS OF LOWER EXTREMITIES WITH ULCER: ICD-10-CM

## 2018-01-29 DIAGNOSIS — L57.0 ACTINIC KERATOSIS: ICD-10-CM

## 2018-01-29 DIAGNOSIS — L82.0 INFLAMED SEBORRHEIC KERATOSIS: ICD-10-CM

## 2018-01-29 PROBLEM — I83.018 VARICOSE VEINS OF RIGHT LOWER EXTREMITY WITH ULCER OTHER PART OF LOWER LEG: Status: ACTIVE | Noted: 2018-01-29

## 2018-01-29 PROCEDURE — OTHER MIPS QUALITY: OTHER

## 2018-01-29 PROCEDURE — 17110 DESTRUCT B9 LESION 1-14: CPT

## 2018-01-29 PROCEDURE — 17003 DESTRUCT PREMALG LES 2-14: CPT

## 2018-01-29 PROCEDURE — 17000 DESTRUCT PREMALG LESION: CPT | Mod: 59

## 2018-01-29 PROCEDURE — OTHER COUNSELING: OTHER

## 2018-01-29 PROCEDURE — 99213 OFFICE O/P EST LOW 20 MIN: CPT | Mod: 25

## 2018-01-29 PROCEDURE — OTHER LIQUID NITROGEN: OTHER

## 2018-01-29 ASSESSMENT — AREA OF WOUND IN CM2: TOTAL AREA OF WOUND IN CM2: 0

## 2018-01-29 ASSESSMENT — LOCATION SIMPLE DESCRIPTION DERM
LOCATION SIMPLE: NOSE
LOCATION SIMPLE: RIGHT ANTERIOR NECK
LOCATION SIMPLE: LEFT ANTERIOR NECK
LOCATION SIMPLE: RIGHT PRETIBIAL REGION

## 2018-01-29 ASSESSMENT — LOCATION DETAILED DESCRIPTION DERM
LOCATION DETAILED: RIGHT DISTAL PRETIBIAL REGION
LOCATION DETAILED: RIGHT CLAVICULAR NECK
LOCATION DETAILED: LEFT CLAVICULAR NECK
LOCATION DETAILED: NASAL SUPRATIP
LOCATION DETAILED: NASAL DORSUM

## 2018-01-29 ASSESSMENT — LOCATION ZONE DERM
LOCATION ZONE: NECK
LOCATION ZONE: NOSE
LOCATION ZONE: LEG

## 2018-01-29 NOTE — PROCEDURE: COUNSELING
Detail Level: Simple
Patient Specific Counseling (Will Not Stick From Patient To Patient): Duoderm CGF was applied to the wound. The patient is to leave the Duoderm CGF in place for two weeks and then return for a follow-up visit to have the wound evaluated.
Detail Level: Detailed

## 2018-01-29 NOTE — PROCEDURE: MIPS QUALITY
Quality 130: Documentation Of Current Medications In The Medical Record: Current Medications Documented
Detail Level: Detailed
Quality 226: Preventive Care And Screening: Tobacco Use: Screening And Cessation Intervention: Patient screened for tobacco and never smoked
Quality 431: Preventive Care And Screening: Unhealthy Alcohol Use - Screening: Patient screened for unhealthy alcohol use using a single question and scores less than 2 times per year
Quality 110: Preventive Care And Screening: Influenza Immunization: Influenza Immunization previously received during influenza season

## 2018-01-29 NOTE — PROCEDURE: LIQUID NITROGEN
Add 52 Modifier (Optional): no
Duration Of Freeze Thaw-Cycle (Seconds): 10
Detail Level: Simple
Total Number Of Lesions Treated: 5
Render Post Care In The Note?: yes
Medical Necessity Information: It is in your best interest to select a reason for this procedure from the list below. All of these items fulfill various CMS LCD requirements except the new and changing color options.
Post-Care Instructions: I reviewed with the patient in detail post-care instructions. Patient is to wear sunprotection, and avoid picking at any of the treated lesions. Pt may apply Vaseline to crusted or scabbing areas.
Number Of Freeze-Thaw Cycles: 1 freeze-thaw cycle
Medical Necessity Clause: This procedure was medically necessary because the lesions that were treated were:
Consent: The patient's consent was obtained including but not limited to risks of crusting, scabbing, blistering, scarring, darker or lighter pigmentary change, recurrence, incomplete removal and infection.
Consent: The patient's verbal consent was obtained including but not limited to risks of crusting, scabbing, blistering, scarring, darker or lighter pigmentary change, recurrence, incomplete removal and infection.
Duration Of Freeze Thaw-Cycle (Seconds): 15

## 2018-02-12 ENCOUNTER — APPOINTMENT (OUTPATIENT)
Age: 83
Setting detail: DERMATOLOGY
End: 2018-03-08

## 2018-02-12 DIAGNOSIS — I83019 VARICOSE VEINS OF LOWER EXTREMITIES WITH ULCER: ICD-10-CM

## 2018-02-12 DIAGNOSIS — I83029 VARICOSE VEINS OF LOWER EXTREMITIES WITH ULCER: ICD-10-CM

## 2018-02-12 DIAGNOSIS — I83009 VARICOSE VEINS OF LOWER EXTREMITIES WITH ULCER: ICD-10-CM

## 2018-02-12 PROBLEM — I83.018 VARICOSE VEINS OF RIGHT LOWER EXTREMITY WITH ULCER OTHER PART OF LOWER LEG: Status: ACTIVE | Noted: 2018-02-12

## 2018-02-12 PROCEDURE — OTHER MIPS QUALITY: OTHER

## 2018-02-12 PROCEDURE — 99212 OFFICE O/P EST SF 10 MIN: CPT

## 2018-02-12 PROCEDURE — OTHER COUNSELING: OTHER

## 2018-02-12 PROCEDURE — OTHER PRESCRIPTION: OTHER

## 2018-02-12 RX ORDER — OSTOMY ADHESIVE
STRIP MISCELLANEOUS
Qty: 1 | Refills: 1 | Status: ERX | COMMUNITY
Start: 2018-02-12

## 2018-02-12 ASSESSMENT — LOCATION SIMPLE DESCRIPTION DERM: LOCATION SIMPLE: RIGHT PRETIBIAL REGION

## 2018-02-12 ASSESSMENT — LOCATION DETAILED DESCRIPTION DERM: LOCATION DETAILED: RIGHT DISTAL PRETIBIAL REGION

## 2018-02-12 ASSESSMENT — LOCATION ZONE DERM: LOCATION ZONE: LEG

## 2018-03-12 ENCOUNTER — APPOINTMENT (OUTPATIENT)
Age: 83
Setting detail: DERMATOLOGY
End: 2018-04-15

## 2018-03-12 DIAGNOSIS — L97 NON-PRESSURE CHRONIC ULCER OF LOWER LIMB, NOT ELSEWHERE CLASSIFIED: ICD-10-CM

## 2018-03-12 DIAGNOSIS — L57.0 ACTINIC KERATOSIS: ICD-10-CM

## 2018-03-12 DIAGNOSIS — L82.0 INFLAMED SEBORRHEIC KERATOSIS: ICD-10-CM

## 2018-03-12 PROBLEM — L97.819 NON-PRESSURE CHRONIC ULCER OF OTHER PART OF RIGHT LOWER LEG WITH UNSPECIFIED SEVERITY: Status: ACTIVE | Noted: 2018-03-12

## 2018-03-12 PROCEDURE — OTHER COUNSELING: OTHER

## 2018-03-12 PROCEDURE — 17110 DESTRUCT B9 LESION 1-14: CPT

## 2018-03-12 PROCEDURE — OTHER LIQUID NITROGEN: OTHER

## 2018-03-12 PROCEDURE — OTHER MIPS QUALITY: OTHER

## 2018-03-12 PROCEDURE — 99213 OFFICE O/P EST LOW 20 MIN: CPT | Mod: 25

## 2018-03-12 ASSESSMENT — LOCATION DETAILED DESCRIPTION DERM
LOCATION DETAILED: RIGHT DISTAL PRETIBIAL REGION
LOCATION DETAILED: RIGHT INFERIOR LATERAL NECK
LOCATION DETAILED: RIGHT ANTERIOR SHOULDER
LOCATION DETAILED: RIGHT CLAVICULAR NECK

## 2018-03-12 ASSESSMENT — LOCATION ZONE DERM
LOCATION ZONE: ARM
LOCATION ZONE: LEG
LOCATION ZONE: NECK

## 2018-03-12 ASSESSMENT — LOCATION SIMPLE DESCRIPTION DERM
LOCATION SIMPLE: RIGHT SHOULDER
LOCATION SIMPLE: RIGHT PRETIBIAL REGION
LOCATION SIMPLE: RIGHT ANTERIOR NECK

## 2018-03-12 NOTE — PROCEDURE: LIQUID NITROGEN
Detail Level: Simple
Duration Of Freeze Thaw-Cycle (Seconds): 15
Consent: The patient's verbal consent was obtained including but not limited to risks of crusting, scabbing, blistering, scarring, darker or lighter pigmentary change, recurrence, incomplete removal and infection.
Total Number Of Lesions Treated: 2
Medical Necessity Information: It is in your best interest to select a reason for this procedure from the list below. All of these items fulfill various CMS LCD requirements except the new and changing color options.
Include Z78.9 (Other Specified Conditions Influencing Health Status) As An Associated Diagnosis?: No
Render Post Care In The Note?: yes
Number Of Freeze-Thaw Cycles: 1 freeze-thaw cycle
Post-Care Instructions: I reviewed with the patient in detail post-care instructions. Patient is to wear sunprotection, and avoid picking at any of the treated lesions. Pt may apply Vaseline to crusted or scabbing areas.
Medical Necessity Clause: This procedure was medically necessary because the lesions that were treated were:

## 2018-03-12 NOTE — PROCEDURE: COUNSELING
Patient Specific Counseling (Will Not Stick From Patient To Patient): Duoderm dressing was removed.\\n\\nUlcer has healed and can discontinue wound care.
Detail Level: Detailed

## 2018-03-12 NOTE — PROCEDURE: MIPS QUALITY
Detail Level: Detailed
Quality 431: Preventive Care And Screening: Unhealthy Alcohol Use - Screening: Patient screened for unhealthy alcohol use using a single question and scores less than 2 times per year
Quality 110: Preventive Care And Screening: Influenza Immunization: Influenza Immunization previously received during influenza season
Quality 131: Pain Assessment And Follow-Up: Pain assessment using a standardized tool is documented as negative, no follow-up plan required
Quality 226: Preventive Care And Screening: Tobacco Use: Screening And Cessation Intervention: Patient screened for tobacco and never smoked
Quality 130: Documentation Of Current Medications In The Medical Record: Current Medications Documented

## 2018-06-11 ENCOUNTER — APPOINTMENT (OUTPATIENT)
Age: 83
Setting detail: DERMATOLOGY
End: 2018-07-15

## 2018-06-11 DIAGNOSIS — L82.1 OTHER SEBORRHEIC KERATOSIS: ICD-10-CM

## 2018-06-11 DIAGNOSIS — L82.0 INFLAMED SEBORRHEIC KERATOSIS: ICD-10-CM

## 2018-06-11 DIAGNOSIS — Z71.89 OTHER SPECIFIED COUNSELING: ICD-10-CM

## 2018-06-11 DIAGNOSIS — D18.0 HEMANGIOMA: ICD-10-CM

## 2018-06-11 DIAGNOSIS — L81.4 OTHER MELANIN HYPERPIGMENTATION: ICD-10-CM

## 2018-06-11 DIAGNOSIS — D22 MELANOCYTIC NEVI: ICD-10-CM

## 2018-06-11 PROBLEM — D18.01 HEMANGIOMA OF SKIN AND SUBCUTANEOUS TISSUE: Status: ACTIVE | Noted: 2018-06-11

## 2018-06-11 PROBLEM — D22.5 MELANOCYTIC NEVI OF TRUNK: Status: ACTIVE | Noted: 2018-06-11

## 2018-06-11 PROCEDURE — OTHER MIPS QUALITY: OTHER

## 2018-06-11 PROCEDURE — OTHER LIQUID NITROGEN: OTHER

## 2018-06-11 PROCEDURE — 17110 DESTRUCT B9 LESION 1-14: CPT

## 2018-06-11 PROCEDURE — OTHER COUNSELING: OTHER

## 2018-06-11 PROCEDURE — 99214 OFFICE O/P EST MOD 30 MIN: CPT | Mod: 25

## 2018-06-11 PROCEDURE — OTHER SUNSCREEN RECOMMENDATIONS: OTHER

## 2018-06-11 ASSESSMENT — LOCATION DETAILED DESCRIPTION DERM
LOCATION DETAILED: RIGHT SUPERIOR MEDIAL UPPER BACK
LOCATION DETAILED: LEFT SUPERIOR MEDIAL UPPER BACK
LOCATION DETAILED: RIGHT VENTRAL DISTAL FOREARM
LOCATION DETAILED: LEFT VENTRAL PROXIMAL FOREARM
LOCATION DETAILED: SUPERIOR THORACIC SPINE
LOCATION DETAILED: RIGHT MEDIAL UPPER BACK

## 2018-06-11 ASSESSMENT — LOCATION ZONE DERM
LOCATION ZONE: ARM
LOCATION ZONE: TRUNK

## 2018-06-11 ASSESSMENT — LOCATION SIMPLE DESCRIPTION DERM
LOCATION SIMPLE: RIGHT UPPER BACK
LOCATION SIMPLE: LEFT FOREARM
LOCATION SIMPLE: UPPER BACK
LOCATION SIMPLE: LEFT UPPER BACK
LOCATION SIMPLE: RIGHT FOREARM

## 2018-06-11 NOTE — PROCEDURE: LIQUID NITROGEN
Add 52 Modifier (Optional): no
Medical Necessity Clause: This procedure was medically necessary because the lesions that were treated were:
Consent: The patient's consent was obtained including but not limited to risks of crusting, scabbing, blistering, scarring, darker or lighter pigmentary change, recurrence, incomplete removal and infection.
Post-Care Instructions: I reviewed with the patient in detail post-care instructions. Patient is to wear sunprotection, and avoid picking at any of the treated lesions. Pt may apply Vaseline to crusted or scabbing areas.
Duration Of Freeze Thaw-Cycle (Seconds): 15-20
Detail Level: Detailed
Medical Necessity Information: It is in your best interest to select a reason for this procedure from the list below. All of these items fulfill various CMS LCD requirements except the new and changing color options.
Number Of Freeze-Thaw Cycles: 1 freeze-thaw cycle
Render Post-Care Instructions In Note?: yes

## 2018-06-11 NOTE — PROCEDURE: SUNSCREEN RECOMMENDATIONS
Detail Level: Detailed
General Sunscreen Counseling: I recommended a broad spectrum (UVA/B blocking) sunscreen with a SPF of 30 or higher.  I explained that SPF 30 sunscreens block approximately 97 percent of the sun's harmful ultraviolet rays.  Sunscreens should be applied at least 15 minutes prior to expected sun exposure and then every 2 hours after that as long as sun exposure continues. If swimming or exercising, sunscreen should be reapplied every 45 minutes to an hour after getting wet or sweating.  One ounce, or the equivalent of a shot glass full of sunscreen, is adequate to protect the skin not covered by a bathing suit. I also recommended a lip balm with a SPF 30 sunscreen as well. Sun protective clothing can be used in lieu of sunscreen, but must be worn the entire time you are exposed to the sun's rays.  Such clothing is woven of fibers with a chemical structure that absorbs or reflects ultraviolet radiation.  The best hats for sun protection have a full 360 degree brim.  Baseball style caps do not protect the ears or the back and sides of the neck and are inadequate for effective sun protection.
Products Recommended: The following products were discussed:\\nAnthelios - La Roche Posay\\nCoppertone Sport\\nNeutrogena sunscreens (many to choose from)\\nIntellishade - Revision (tinted)\\nDaily SPF 33 Protectant - Miguel Salazar (non-tinted)

## 2018-06-11 NOTE — PROCEDURE: MIPS QUALITY
Detail Level: Detailed
Quality 130: Documentation Of Current Medications In The Medical Record: Current Medications Documented
Quality 431: Preventive Care And Screening: Unhealthy Alcohol Use - Screening: Patient screened for unhealthy alcohol use using a single question and scores less than 2 times per year
Quality 110: Preventive Care And Screening: Influenza Immunization: Influenza Immunization previously received during influenza season
Quality 226: Preventive Care And Screening: Tobacco Use: Screening And Cessation Intervention: Patient screened for tobacco and never smoked

## 2019-06-03 ENCOUNTER — HOSPITAL ENCOUNTER (EMERGENCY)
Facility: CLINIC | Age: 84
Discharge: HOME | End: 2019-06-03
Attending: EMERGENCY MEDICINE | Admitting: EMERGENCY MEDICINE
Payer: COMMERCIAL

## 2019-06-03 VITALS
BODY MASS INDEX: 25.75 KG/M2 | DIASTOLIC BLOOD PRESSURE: 89 MMHG | OXYGEN SATURATION: 97 % | HEART RATE: 81 BPM | RESPIRATION RATE: 18 BRPM | SYSTOLIC BLOOD PRESSURE: 162 MMHG | TEMPERATURE: 97.4 F | WEIGHT: 150 LBS

## 2019-06-03 DIAGNOSIS — R42 LIGHTHEADEDNESS: ICD-10-CM

## 2019-06-03 DIAGNOSIS — I48.91 ATRIAL FIBRILLATION, UNSPECIFIED TYPE (H): ICD-10-CM

## 2019-06-03 LAB
ANION GAP SERPL CALCULATED.3IONS-SCNC: 5 MMOL/L (ref 3–14)
BASOPHILS # BLD AUTO: 0.1 10E9/L (ref 0–0.2)
BASOPHILS NFR BLD AUTO: 0.6 %
BUN SERPL-MCNC: 17 MG/DL (ref 7–30)
CALCIUM SERPL-MCNC: 9 MG/DL (ref 8.5–10.1)
CHLORIDE SERPL-SCNC: 106 MMOL/L (ref 94–109)
CO2 SERPL-SCNC: 31 MMOL/L (ref 20–32)
CREAT SERPL-MCNC: 0.91 MG/DL (ref 0.52–1.04)
DIFFERENTIAL METHOD BLD: ABNORMAL
EOSINOPHIL # BLD AUTO: 0.2 10E9/L (ref 0–0.7)
EOSINOPHIL NFR BLD AUTO: 2 %
ERYTHROCYTE [DISTWIDTH] IN BLOOD BY AUTOMATED COUNT: 14.3 % (ref 10–15)
GFR SERPL CREATININE-BSD FRML MDRD: 52 ML/MIN/{1.73_M2}
GLUCOSE SERPL-MCNC: 107 MG/DL (ref 70–99)
HCT VFR BLD AUTO: 40.8 % (ref 35–47)
HGB BLD-MCNC: 13.8 G/DL (ref 11.7–15.7)
IMM GRANULOCYTES # BLD: 0 10E9/L (ref 0–0.4)
IMM GRANULOCYTES NFR BLD: 0.3 %
INR PPP: 2.25 (ref 0.86–1.14)
INTERPRETATION ECG - MUSE: NORMAL
INTERPRETATION ECG - MUSE: NORMAL
LYMPHOCYTES # BLD AUTO: 2.9 10E9/L (ref 0.8–5.3)
LYMPHOCYTES NFR BLD AUTO: 33.1 %
MCH RBC QN AUTO: 33.8 PG (ref 26.5–33)
MCHC RBC AUTO-ENTMCNC: 33.8 G/DL (ref 31.5–36.5)
MCV RBC AUTO: 100 FL (ref 78–100)
MONOCYTES # BLD AUTO: 0.5 10E9/L (ref 0–1.3)
MONOCYTES NFR BLD AUTO: 5.6 %
NEUTROPHILS # BLD AUTO: 5.1 10E9/L (ref 1.6–8.3)
NEUTROPHILS NFR BLD AUTO: 58.4 %
NRBC # BLD AUTO: 0 10*3/UL
NRBC BLD AUTO-RTO: 0 /100
PLATELET # BLD AUTO: 225 10E9/L (ref 150–450)
POTASSIUM SERPL-SCNC: 4.3 MMOL/L (ref 3.4–5.3)
RBC # BLD AUTO: 4.08 10E12/L (ref 3.8–5.2)
SODIUM SERPL-SCNC: 142 MMOL/L (ref 133–144)
TROPONIN I SERPL-MCNC: <0.015 UG/L (ref 0–0.04)
WBC # BLD AUTO: 8.7 10E9/L (ref 4–11)

## 2019-06-03 PROCEDURE — 85610 PROTHROMBIN TIME: CPT | Performed by: EMERGENCY MEDICINE

## 2019-06-03 PROCEDURE — 96360 HYDRATION IV INFUSION INIT: CPT

## 2019-06-03 PROCEDURE — 93005 ELECTROCARDIOGRAM TRACING: CPT | Mod: 76

## 2019-06-03 PROCEDURE — 93005 ELECTROCARDIOGRAM TRACING: CPT

## 2019-06-03 PROCEDURE — 80048 BASIC METABOLIC PNL TOTAL CA: CPT | Performed by: EMERGENCY MEDICINE

## 2019-06-03 PROCEDURE — 84484 ASSAY OF TROPONIN QUANT: CPT | Performed by: EMERGENCY MEDICINE

## 2019-06-03 PROCEDURE — 85025 COMPLETE CBC W/AUTO DIFF WBC: CPT | Performed by: EMERGENCY MEDICINE

## 2019-06-03 PROCEDURE — 99284 EMERGENCY DEPT VISIT MOD MDM: CPT | Mod: 25

## 2019-06-03 PROCEDURE — 96361 HYDRATE IV INFUSION ADD-ON: CPT

## 2019-06-03 PROCEDURE — 25800030 ZZH RX IP 258 OP 636: Performed by: EMERGENCY MEDICINE

## 2019-06-03 RX ORDER — SODIUM CHLORIDE 9 MG/ML
INJECTION, SOLUTION INTRAVENOUS CONTINUOUS
Status: DISCONTINUED | OUTPATIENT
Start: 2019-06-03 | End: 2019-06-03 | Stop reason: HOSPADM

## 2019-06-03 RX ADMIN — SODIUM CHLORIDE: 9 INJECTION, SOLUTION INTRAVENOUS at 15:03

## 2019-06-03 ASSESSMENT — ENCOUNTER SYMPTOMS
SHORTNESS OF BREATH: 0
APPETITE CHANGE: 1
ABDOMINAL PAIN: 0
LIGHT-HEADEDNESS: 1
VOMITING: 0
NAUSEA: 0

## 2019-06-03 NOTE — DISCHARGE INSTRUCTIONS
Follow up with your doctor about the atrial fibrillation  Discuss at appointment if metoprolol should be added to your medications  If you feel lightheaded, dizzy make sure to sit down

## 2019-06-03 NOTE — ED PROVIDER NOTES
"  History     Chief Complaint:  Lightheadedness    HPI   Ham Marie is a 89 year old female with a history of PE on Warfarin who presents with reported hypotension and lightheadedness. The patient reports that she went to the eye doctor this morning for her routine macular degeneration injections. While there, they noticed that she was hypotensive via a wrist blood pressure cuff. However, the patient was feeling alright at that time and subsequently went to her hair appointment and out to lunch with her daughter/daughter-in-law. During lunch, the patient's daughter notes she suddenly had a poor appetite and noted feeling \"woozy,\" which persisted for some time, so they brought her to the ED for further evaluation. Did not eat breakfast this morning. The patient notes that she has been taking her medications normally. She denies nausea, shortness of breath, new swelling of legs, abdominal pain, chest pain, palpitations, or other acute symptoms.     Allergies:  Erythromycin  Zocor     Medications:    Warfarin  Synthroid    Past Medical History:    Arthritis  Carpal tunnel syndrome  Deep Venous Thrombosis  Heart murmur  Hypertension  Hyperlipidemia  Pulmonary Embolism  Macular degeneration  Hyperthyroidism    Past Surgical History:    Appendectomy  Arthroscopy knee with debridement joint  Hysterectomy    Family History:    History reviewed. No pertinent family history.     Social History:  Smoking status: No  Alcohol use: Yes, Rarely  Drug use: No  Presents to the ED with Daughter and daughter in law  Marital Status:   [5]     Review of Systems   Constitutional: Positive for appetite change.   Respiratory: Negative for shortness of breath.    Cardiovascular: Negative for chest pain and leg swelling.   Gastrointestinal: Negative for abdominal pain, nausea and vomiting.   Neurological: Positive for light-headedness.   All other systems reviewed and are negative.    Physical Exam     Patient Vitals for the " past 24 hrs:   BP Temp Temp src Pulse Heart Rate Resp SpO2 Weight   06/03/19 1611 162/89 -- -- -- 72 18 97 % --   06/03/19 1610 -- -- -- -- 79 17 -- --   06/03/19 1550 -- -- -- -- 80 15 94 % --   06/03/19 1543 (!) 150/94 -- -- 81 82 9 93 % --   06/03/19 1540 -- -- -- -- 76 12 94 % --   06/03/19 1416 153/84 97.4  F (36.3  C) Temporal 93 -- 12 95 % 68 kg (150 lb)     Physical Exam  General: Resting comfortably on the gurney  Eyes:  The pupils are equal and round    Conjunctivae and sclerae are normal  ENT:    Moist mucous membranes  Neck:  Normal range of motion  CV:  Irregular rate and rhythm    Skin warm and well perfused   Resp:  Lungs are clear    Non-labored    No rales    No wheezing   GI:  Abdomen is soft, there is no rigidity    No distension    No rebound tenderness     No abdominal tenderness  MS:  Bilateral LE edema  Skin:  No rash or acute skin lesions noted  Neuro:   Awake, alert.      Speech is normal and fluent.    Face is symmetric.     Moves all extremities equally  Psych: Normal affect.  Appropriate interactions.    Emergency Department Course   ECG (14:22:28):  Rate 87 bpm. KY interval *. QRS duration 128. QT/QTc 372/447. P-R-T axes * -68 102. Atrial fibrillation. Left axis deviation. Left bundle branch block. Abnormal ECG. Interpreted at 1517 by Destini Villarreal MD.    ECG (15:33:29):  Rate 76 bpm. [place] interval *. QRS duration 130. QT/QTc 402/452. P-R-T axes * -47 166. Atrial fibrillation. Left axis deviation. Left bundle branch block. Abnormal ECG. No significant change compared to EKG dated 6/3/19. Interpreted at 1541 by Destini Villarreal MD.    Laboratory:  CBC: WNL (WBC 8.7, HGB 13.8, )  INR: 2.25 (H)  BMP: Glucose 107 (H), GFR 52 (L), WNL (Creatinine 0.91)  Troponin I (1446): <0.015    Interventions:  1503: NS 1L IV Bolus    Emergency Department Course:  Past medical records, nursing notes, and vitals reviewed.  1440: I performed an exam of the patient and obtained history,  as documented above.  ECG performed, results above.  IV inserted and blood drawn. The patient was placed on continuous cardiac monitoring and pulse oximetry.    1533: Repeat ECG performed, results above.     1600: The patient was able to ambulate without difficulty and would like to go home.    1619: I rechecked the patient. Findings and plan explained to the patient and her family. Patient discharged home with instructions regarding supportive care, medications, and reasons to return. The importance of close follow-up was reviewed.     Impression & Plan    Medical Decision Making:  Ham Marie is a 89 year old female who presents to the ED for evaluation of lightheadedness. Patient with mild lightheadedness. No hypotension in ED. BP actually mildly elevated. Unclear if it was true hypotension at eye clinic as it wrist cuff. Non focal neurologic exam, doubt CVA/TIA. EKG shows atrial fibrillation. No hx of this but suspect that this has been ongoing for some time. She does not feel an abnormal heart rhythm or palpitations. Labs unremarkable. Pt feeling better in ED and wanted to go home. Her HR is controlled. No indication for further workup/admission for atrial fibrillation at this time. She is already on coumadin so will continue this. Given that there was concern for hypotension earlier today, discussed with family that she should follow-up with PCP and can decide at that time if adding metoprolol or diltiazem to medications is indicated. Her heart rate is controlled in ED. Pt and family agreeable to plan.     Diagnosis:    ICD-10-CM   1. Atrial fibrillation, unspecified type (H) I48.91   2. Lightheadedness R42     Disposition: Discharged to home    Mars Baltazar  6/3/2019    EMERGENCY DEPARTMENT  IMars am serving as a scribe at 2:40 PM on 6/3/2019 to document services personally performed by Destini Villarreal MD based on my observations and the provider's statements to me.         Destini Villarreal MD  06/03/19 2032

## 2019-06-03 NOTE — ED AVS SNAPSHOT
Emergency Department  [address] [place] [id number]  Phone:  [phone]  Fax:  [phone]                                    Ham Marie   MRN: [id number]    Department:   Emergency Department   Date of Visit:  6/3/2019           After Visit Summary Signature Page    I have received my discharge instructions, and my questions have been answered. I have discussed any challenges I see with this plan with the nurse or doctor.    ..........................................................................................................................................  Patient/Patient Representative Signature      ..........................................................................................................................................  Patient Representative Print Name and Relationship to Patient    ..................................................               ................................................  Date                                   Time    ..........................................................................................................................................  Reviewed by Signature/Title    ...................................................              ..............................................  Date                                               Time          22EPIC Rev 08/18

## 2019-06-03 NOTE — ED TRIAGE NOTES
"Was at eye clinic for macular degeneration treatment today. The clinic did a BP check on the pt using a wrist bp cuff and was concerned because the bp was 88/40. Pt also wasn't feeling hungry today and states \"I just don't feel right\"  "

## 2019-06-03 NOTE — ED NOTES
"Road test complete. Patient did well with walker. States \"I felt weird for a second but it passed\". Patient walked well, steady. Back on monitor now, appears to be in afib.   "

## 2022-01-01 ENCOUNTER — APPOINTMENT (OUTPATIENT)
Dept: PHYSICAL THERAPY | Facility: CLINIC | Age: 87
DRG: 177 | End: 2022-01-01
Payer: COMMERCIAL

## 2022-01-01 ENCOUNTER — APPOINTMENT (OUTPATIENT)
Dept: GENERAL RADIOLOGY | Facility: CLINIC | Age: 87
DRG: 177 | End: 2022-01-01
Attending: INTERNAL MEDICINE
Payer: COMMERCIAL

## 2022-01-01 ENCOUNTER — APPOINTMENT (OUTPATIENT)
Dept: PHYSICAL THERAPY | Facility: CLINIC | Age: 87
DRG: 177 | End: 2022-01-01
Attending: INTERNAL MEDICINE
Payer: COMMERCIAL

## 2022-01-01 ENCOUNTER — HOSPITAL ENCOUNTER (INPATIENT)
Facility: CLINIC | Age: 87
LOS: 9 days | Discharge: NURSING, REHAB OR LONG-TERM CARE | DRG: 177 | End: 2023-01-04
Attending: EMERGENCY MEDICINE | Admitting: INTERNAL MEDICINE
Payer: COMMERCIAL

## 2022-01-01 ENCOUNTER — APPOINTMENT (OUTPATIENT)
Dept: GENERAL RADIOLOGY | Facility: CLINIC | Age: 87
DRG: 177 | End: 2022-01-01
Attending: EMERGENCY MEDICINE
Payer: COMMERCIAL

## 2022-01-01 ENCOUNTER — APPOINTMENT (OUTPATIENT)
Dept: SPEECH THERAPY | Facility: CLINIC | Age: 87
DRG: 177 | End: 2022-01-01
Attending: NURSE PRACTITIONER
Payer: COMMERCIAL

## 2022-01-01 DIAGNOSIS — U07.1 INFECTION DUE TO 2019 NOVEL CORONAVIRUS: ICD-10-CM

## 2022-01-01 DIAGNOSIS — M17.0 PRIMARY OSTEOARTHRITIS OF BOTH KNEES: Primary | ICD-10-CM

## 2022-01-01 LAB
ALBUMIN SERPL-MCNC: 3.2 G/DL (ref 3.4–5)
ALP SERPL-CCNC: 74 U/L (ref 40–150)
ALT SERPL W P-5'-P-CCNC: 15 U/L (ref 0–50)
ANION GAP SERPL CALCULATED.3IONS-SCNC: 5 MMOL/L (ref 3–14)
ANION GAP SERPL CALCULATED.3IONS-SCNC: 6 MMOL/L (ref 3–14)
ANION GAP SERPL CALCULATED.3IONS-SCNC: 6 MMOL/L (ref 3–14)
ANION GAP SERPL CALCULATED.3IONS-SCNC: 9 MMOL/L (ref 3–14)
ANION GAP SERPL CALCULATED.3IONS-SCNC: 9 MMOL/L (ref 3–14)
AST SERPL W P-5'-P-CCNC: 20 U/L (ref 0–45)
BASOPHILS # BLD AUTO: 0.1 10E3/UL (ref 0–0.2)
BASOPHILS NFR BLD AUTO: 1 %
BILIRUB SERPL-MCNC: 1 MG/DL (ref 0.2–1.3)
BUN SERPL-MCNC: 16 MG/DL (ref 7–30)
BUN SERPL-MCNC: 23 MG/DL (ref 7–30)
BUN SERPL-MCNC: 24 MG/DL (ref 7–30)
BUN SERPL-MCNC: 25 MG/DL (ref 7–30)
BUN SERPL-MCNC: 26 MG/DL (ref 7–30)
CALCIUM SERPL-MCNC: 8.1 MG/DL (ref 8.5–10.1)
CALCIUM SERPL-MCNC: 8.3 MG/DL (ref 8.5–10.1)
CALCIUM SERPL-MCNC: 9.3 MG/DL (ref 8.5–10.1)
CHLORIDE BLD-SCNC: 101 MMOL/L (ref 94–109)
CHLORIDE BLD-SCNC: 101 MMOL/L (ref 94–109)
CHLORIDE BLD-SCNC: 102 MMOL/L (ref 94–109)
CHLORIDE BLD-SCNC: 103 MMOL/L (ref 94–109)
CHLORIDE BLD-SCNC: 98 MMOL/L (ref 94–109)
CO2 SERPL-SCNC: 22 MMOL/L (ref 20–32)
CO2 SERPL-SCNC: 23 MMOL/L (ref 20–32)
CO2 SERPL-SCNC: 24 MMOL/L (ref 20–32)
CO2 SERPL-SCNC: 25 MMOL/L (ref 20–32)
CO2 SERPL-SCNC: 27 MMOL/L (ref 20–32)
CREAT SERPL-MCNC: 0.65 MG/DL (ref 0.52–1.04)
CREAT SERPL-MCNC: 0.66 MG/DL (ref 0.52–1.04)
CREAT SERPL-MCNC: 0.7 MG/DL (ref 0.52–1.04)
CREAT SERPL-MCNC: 0.72 MG/DL (ref 0.52–1.04)
CREAT SERPL-MCNC: 0.8 MG/DL (ref 0.52–1.04)
CRP SERPL-MCNC: 109 MG/L (ref 0–8)
CRP SERPL-MCNC: 35.1 MG/L (ref 0–8)
CRP SERPL-MCNC: 49.9 MG/L (ref 0–8)
CRP SERPL-MCNC: 74.4 MG/L (ref 0–8)
CRP SERPL-MCNC: 94.3 MG/L (ref 0–8)
D DIMER PPP FEU-MCNC: <0.27 UG/ML FEU (ref 0–0.5)
D DIMER PPP FEU-MCNC: <0.27 UG/ML FEU (ref 0–0.5)
EOSINOPHIL # BLD AUTO: 0.3 10E3/UL (ref 0–0.7)
EOSINOPHIL NFR BLD AUTO: 3 %
ERYTHROCYTE [DISTWIDTH] IN BLOOD BY AUTOMATED COUNT: 14.6 % (ref 10–15)
ERYTHROCYTE [DISTWIDTH] IN BLOOD BY AUTOMATED COUNT: 14.6 % (ref 10–15)
ERYTHROCYTE [DISTWIDTH] IN BLOOD BY AUTOMATED COUNT: 14.7 % (ref 10–15)
FLUAV RNA SPEC QL NAA+PROBE: NEGATIVE
FLUBV RNA RESP QL NAA+PROBE: NEGATIVE
GFR SERPL CREATININE-BSD FRML MDRD: 65 ML/MIN/1.73M2
GFR SERPL CREATININE-BSD FRML MDRD: 74 ML/MIN/1.73M2
GFR SERPL CREATININE-BSD FRML MDRD: 76 ML/MIN/1.73M2
GFR SERPL CREATININE-BSD FRML MDRD: 77 ML/MIN/1.73M2
GFR SERPL CREATININE-BSD FRML MDRD: 78 ML/MIN/1.73M2
GLUCOSE BLD-MCNC: 103 MG/DL (ref 70–99)
GLUCOSE BLD-MCNC: 115 MG/DL (ref 70–99)
GLUCOSE BLD-MCNC: 139 MG/DL (ref 70–99)
GLUCOSE BLD-MCNC: 94 MG/DL (ref 70–99)
GLUCOSE BLD-MCNC: 94 MG/DL (ref 70–99)
HCT VFR BLD AUTO: 34.6 % (ref 35–47)
HCT VFR BLD AUTO: 36.6 % (ref 35–47)
HCT VFR BLD AUTO: 37.4 % (ref 35–47)
HGB BLD-MCNC: 11.3 G/DL (ref 11.7–15.7)
HGB BLD-MCNC: 12.1 G/DL (ref 11.7–15.7)
HGB BLD-MCNC: 12.5 G/DL (ref 11.7–15.7)
HOLD SPECIMEN: NORMAL
IMM GRANULOCYTES # BLD: 0.1 10E3/UL
IMM GRANULOCYTES NFR BLD: 1 %
INR PPP: 1.4 (ref 0.85–1.15)
LYMPHOCYTES # BLD AUTO: 2.1 10E3/UL (ref 0.8–5.3)
LYMPHOCYTES NFR BLD AUTO: 22 %
MCH RBC QN AUTO: 32.6 PG (ref 26.5–33)
MCH RBC QN AUTO: 33.2 PG (ref 26.5–33)
MCH RBC QN AUTO: 34.1 PG (ref 26.5–33)
MCHC RBC AUTO-ENTMCNC: 32.7 G/DL (ref 31.5–36.5)
MCHC RBC AUTO-ENTMCNC: 33.1 G/DL (ref 31.5–36.5)
MCHC RBC AUTO-ENTMCNC: 33.4 G/DL (ref 31.5–36.5)
MCV RBC AUTO: 100 FL (ref 78–100)
MCV RBC AUTO: 101 FL (ref 78–100)
MCV RBC AUTO: 102 FL (ref 78–100)
MONOCYTES # BLD AUTO: 0.8 10E3/UL (ref 0–1.3)
MONOCYTES NFR BLD AUTO: 9 %
NEUTROPHILS # BLD AUTO: 6.2 10E3/UL (ref 1.6–8.3)
NEUTROPHILS NFR BLD AUTO: 64 %
NRBC # BLD AUTO: 0 10E3/UL
NRBC BLD AUTO-RTO: 0 /100
PLATELET # BLD AUTO: 172 10E3/UL (ref 150–450)
PLATELET # BLD AUTO: 202 10E3/UL (ref 150–450)
PLATELET # BLD AUTO: 212 10E3/UL (ref 150–450)
POTASSIUM BLD-SCNC: 3.8 MMOL/L (ref 3.4–5.3)
POTASSIUM BLD-SCNC: 3.9 MMOL/L (ref 3.4–5.3)
POTASSIUM BLD-SCNC: 4 MMOL/L (ref 3.4–5.3)
POTASSIUM BLD-SCNC: 4.3 MMOL/L (ref 3.4–5.3)
POTASSIUM BLD-SCNC: 4.4 MMOL/L (ref 3.4–5.3)
PROT SERPL-MCNC: 7.2 G/DL (ref 6.8–8.8)
RBC # BLD AUTO: 3.47 10E6/UL (ref 3.8–5.2)
RBC # BLD AUTO: 3.64 10E6/UL (ref 3.8–5.2)
RBC # BLD AUTO: 3.67 10E6/UL (ref 3.8–5.2)
RSV RNA SPEC NAA+PROBE: NEGATIVE
SARS-COV-2 RNA RESP QL NAA+PROBE: POSITIVE
SODIUM SERPL-SCNC: 130 MMOL/L (ref 133–144)
SODIUM SERPL-SCNC: 132 MMOL/L (ref 133–144)
SODIUM SERPL-SCNC: 133 MMOL/L (ref 133–144)
T4 FREE SERPL-MCNC: 1 NG/DL (ref 0.76–1.46)
TROPONIN I SERPL HS-MCNC: 11 NG/L
TSH SERPL DL<=0.005 MIU/L-ACNC: 8.31 MU/L (ref 0.4–4)
WBC # BLD AUTO: 5.3 10E3/UL (ref 4–11)
WBC # BLD AUTO: 7.6 10E3/UL (ref 4–11)
WBC # BLD AUTO: 9.5 10E3/UL (ref 4–11)

## 2022-01-01 PROCEDURE — 86140 C-REACTIVE PROTEIN: CPT | Performed by: INTERNAL MEDICINE

## 2022-01-01 PROCEDURE — 85379 FIBRIN DEGRADATION QUANT: CPT | Performed by: INTERNAL MEDICINE

## 2022-01-01 PROCEDURE — 85025 COMPLETE CBC W/AUTO DIFF WBC: CPT | Performed by: EMERGENCY MEDICINE

## 2022-01-01 PROCEDURE — 99285 EMERGENCY DEPT VISIT HI MDM: CPT | Mod: 25

## 2022-01-01 PROCEDURE — 250N000013 HC RX MED GY IP 250 OP 250 PS 637: Performed by: INTERNAL MEDICINE

## 2022-01-01 PROCEDURE — 120N000001 HC R&B MED SURG/OB

## 2022-01-01 PROCEDURE — 36415 COLL VENOUS BLD VENIPUNCTURE: CPT | Performed by: INTERNAL MEDICINE

## 2022-01-01 PROCEDURE — 80048 BASIC METABOLIC PNL TOTAL CA: CPT | Performed by: INTERNAL MEDICINE

## 2022-01-01 PROCEDURE — G0378 HOSPITAL OBSERVATION PER HR: HCPCS

## 2022-01-01 PROCEDURE — XW033E5 INTRODUCTION OF REMDESIVIR ANTI-INFECTIVE INTO PERIPHERAL VEIN, PERCUTANEOUS APPROACH, NEW TECHNOLOGY GROUP 5: ICD-10-PCS | Performed by: INTERNAL MEDICINE

## 2022-01-01 PROCEDURE — 258N000003 HC RX IP 258 OP 636: Performed by: INTERNAL MEDICINE

## 2022-01-01 PROCEDURE — 73560 X-RAY EXAM OF KNEE 1 OR 2: CPT | Mod: 50

## 2022-01-01 PROCEDURE — 92526 ORAL FUNCTION THERAPY: CPT | Mod: GN | Performed by: SPEECH-LANGUAGE PATHOLOGIST

## 2022-01-01 PROCEDURE — 97116 GAIT TRAINING THERAPY: CPT | Mod: GP

## 2022-01-01 PROCEDURE — 84443 ASSAY THYROID STIM HORMONE: CPT | Performed by: EMERGENCY MEDICINE

## 2022-01-01 PROCEDURE — XW033H6 INTRODUCTION OF OTHER NEW TECHNOLOGY MONOCLONAL ANTIBODY INTO PERIPHERAL VEIN, PERCUTANEOUS APPROACH, NEW TECHNOLOGY GROUP 6: ICD-10-PCS | Performed by: INTERNAL MEDICINE

## 2022-01-01 PROCEDURE — 82310 ASSAY OF CALCIUM: CPT | Performed by: INTERNAL MEDICINE

## 2022-01-01 PROCEDURE — 97530 THERAPEUTIC ACTIVITIES: CPT | Mod: GP

## 2022-01-01 PROCEDURE — C9803 HOPD COVID-19 SPEC COLLECT: HCPCS

## 2022-01-01 PROCEDURE — 92610 EVALUATE SWALLOWING FUNCTION: CPT | Mod: GN | Performed by: SPEECH-LANGUAGE PATHOLOGIST

## 2022-01-01 PROCEDURE — 99232 SBSQ HOSP IP/OBS MODERATE 35: CPT | Performed by: INTERNAL MEDICINE

## 2022-01-01 PROCEDURE — 97110 THERAPEUTIC EXERCISES: CPT | Mod: GP

## 2022-01-01 PROCEDURE — 99225 PR SUBSEQUENT OBSERVATION CARE,LEVEL II: CPT | Performed by: INTERNAL MEDICINE

## 2022-01-01 PROCEDURE — 250N000013 HC RX MED GY IP 250 OP 250 PS 637: Performed by: PHYSICIAN ASSISTANT

## 2022-01-01 PROCEDURE — 2894A VOIDCORRECT: CPT | Performed by: INTERNAL MEDICINE

## 2022-01-01 PROCEDURE — 85610 PROTHROMBIN TIME: CPT | Performed by: EMERGENCY MEDICINE

## 2022-01-01 PROCEDURE — 36415 COLL VENOUS BLD VENIPUNCTURE: CPT | Performed by: EMERGENCY MEDICINE

## 2022-01-01 PROCEDURE — 84439 ASSAY OF FREE THYROXINE: CPT | Performed by: EMERGENCY MEDICINE

## 2022-01-01 PROCEDURE — 250N000011 HC RX IP 250 OP 636: Performed by: INTERNAL MEDICINE

## 2022-01-01 PROCEDURE — 71046 X-RAY EXAM CHEST 2 VIEWS: CPT

## 2022-01-01 PROCEDURE — 82947 ASSAY GLUCOSE BLOOD QUANT: CPT | Performed by: EMERGENCY MEDICINE

## 2022-01-01 PROCEDURE — 84484 ASSAY OF TROPONIN QUANT: CPT | Performed by: EMERGENCY MEDICINE

## 2022-01-01 PROCEDURE — M0247 HC IV INFUSION, SOTROVIMAB, INCL INFUSION AND POST ADMIN MONITORING: HCPCS

## 2022-01-01 PROCEDURE — 99219 PR INITIAL OBSERVATION CARE,LEVEL II: CPT | Mod: CS | Performed by: INTERNAL MEDICINE

## 2022-01-01 PROCEDURE — 87637 SARSCOV2&INF A&B&RSV AMP PRB: CPT | Performed by: EMERGENCY MEDICINE

## 2022-01-01 PROCEDURE — 85027 COMPLETE CBC AUTOMATED: CPT | Performed by: INTERNAL MEDICINE

## 2022-01-01 PROCEDURE — 97161 PT EVAL LOW COMPLEX 20 MIN: CPT | Mod: GP

## 2022-01-01 PROCEDURE — 82310 ASSAY OF CALCIUM: CPT | Performed by: EMERGENCY MEDICINE

## 2022-01-01 PROCEDURE — G0463 HOSPITAL OUTPT CLINIC VISIT: HCPCS

## 2022-01-01 RX ORDER — NALOXONE HYDROCHLORIDE 0.4 MG/ML
0.4 INJECTION, SOLUTION INTRAMUSCULAR; INTRAVENOUS; SUBCUTANEOUS
Status: DISCONTINUED | OUTPATIENT
Start: 2022-01-01 | End: 2023-01-01 | Stop reason: HOSPADM

## 2022-01-01 RX ORDER — ACETAMINOPHEN 325 MG/1
650 TABLET ORAL EVERY 6 HOURS PRN
Status: DISCONTINUED | OUTPATIENT
Start: 2022-01-01 | End: 2022-01-01

## 2022-01-01 RX ORDER — AMOXICILLIN 250 MG
2 CAPSULE ORAL 2 TIMES DAILY PRN
Status: DISCONTINUED | OUTPATIENT
Start: 2022-01-01 | End: 2023-01-01 | Stop reason: HOSPADM

## 2022-01-01 RX ORDER — METOPROLOL TARTRATE 25 MG/1
25 TABLET, FILM COATED ORAL 2 TIMES DAILY
Status: DISCONTINUED | OUTPATIENT
Start: 2022-01-01 | End: 2022-01-01

## 2022-01-01 RX ORDER — MIRABEGRON 50 MG/1
50 TABLET, EXTENDED RELEASE ORAL DAILY
Status: DISCONTINUED | OUTPATIENT
Start: 2022-01-01 | End: 2023-01-01 | Stop reason: HOSPADM

## 2022-01-01 RX ORDER — VIT C/E/ZN/COPPR/LUTEIN/ZEAXAN 60 MG-6 MG
1 CAPSULE ORAL 2 TIMES DAILY
Status: DISCONTINUED | OUTPATIENT
Start: 2022-01-01 | End: 2023-01-01 | Stop reason: HOSPADM

## 2022-01-01 RX ORDER — OXYCODONE HYDROCHLORIDE 5 MG/1
5 TABLET ORAL EVERY 6 HOURS PRN
Status: DISCONTINUED | OUTPATIENT
Start: 2022-01-01 | End: 2023-01-01 | Stop reason: HOSPADM

## 2022-01-01 RX ORDER — PROCHLORPERAZINE 25 MG
12.5 SUPPOSITORY, RECTAL RECTAL EVERY 12 HOURS PRN
Status: DISCONTINUED | OUTPATIENT
Start: 2022-01-01 | End: 2023-01-01 | Stop reason: HOSPADM

## 2022-01-01 RX ORDER — LIDOCAINE 40 MG/G
CREAM TOPICAL
Status: DISCONTINUED | OUTPATIENT
Start: 2022-01-01 | End: 2023-01-01 | Stop reason: HOSPADM

## 2022-01-01 RX ORDER — LEVOTHYROXINE SODIUM 50 UG/1
50 TABLET ORAL DAILY
COMMUNITY
Start: 2022-01-01

## 2022-01-01 RX ORDER — POLYETHYLENE GLYCOL 3350 17 G/17G
17 POWDER, FOR SOLUTION ORAL DAILY PRN
Status: DISCONTINUED | OUTPATIENT
Start: 2022-01-01 | End: 2023-01-01 | Stop reason: HOSPADM

## 2022-01-01 RX ORDER — NALOXONE HYDROCHLORIDE 0.4 MG/ML
0.2 INJECTION, SOLUTION INTRAMUSCULAR; INTRAVENOUS; SUBCUTANEOUS
Status: DISCONTINUED | OUTPATIENT
Start: 2022-01-01 | End: 2023-01-01 | Stop reason: HOSPADM

## 2022-01-01 RX ORDER — ACETAMINOPHEN 650 MG/1
650 SUPPOSITORY RECTAL EVERY 6 HOURS PRN
Status: DISCONTINUED | OUTPATIENT
Start: 2022-01-01 | End: 2022-01-01

## 2022-01-01 RX ORDER — ALBUTEROL SULFATE 90 UG/1
2 AEROSOL, METERED RESPIRATORY (INHALATION) 4 TIMES DAILY
Status: DISCONTINUED | OUTPATIENT
Start: 2022-01-01 | End: 2023-01-01

## 2022-01-01 RX ORDER — ONDANSETRON 4 MG/1
4 TABLET, ORALLY DISINTEGRATING ORAL EVERY 6 HOURS PRN
Status: DISCONTINUED | OUTPATIENT
Start: 2022-01-01 | End: 2023-01-01 | Stop reason: HOSPADM

## 2022-01-01 RX ORDER — BENZONATATE 100 MG/1
100 CAPSULE ORAL 3 TIMES DAILY PRN
Status: DISCONTINUED | OUTPATIENT
Start: 2022-01-01 | End: 2023-01-01 | Stop reason: HOSPADM

## 2022-01-01 RX ORDER — ACETAMINOPHEN 325 MG/1
975 TABLET ORAL EVERY 8 HOURS
Status: DISCONTINUED | OUTPATIENT
Start: 2022-01-01 | End: 2023-01-01 | Stop reason: HOSPADM

## 2022-01-01 RX ORDER — METOPROLOL TARTRATE 25 MG/1
25 TABLET, FILM COATED ORAL 2 TIMES DAILY
COMMUNITY
Start: 2022-01-01

## 2022-01-01 RX ORDER — PROCHLORPERAZINE MALEATE 5 MG
5 TABLET ORAL EVERY 6 HOURS PRN
Status: DISCONTINUED | OUTPATIENT
Start: 2022-01-01 | End: 2023-01-01 | Stop reason: HOSPADM

## 2022-01-01 RX ORDER — LEVOTHYROXINE SODIUM 50 UG/1
50 TABLET ORAL DAILY
Status: DISCONTINUED | OUTPATIENT
Start: 2022-01-01 | End: 2023-01-01

## 2022-01-01 RX ORDER — AMOXICILLIN 250 MG
1 CAPSULE ORAL 2 TIMES DAILY PRN
Status: DISCONTINUED | OUTPATIENT
Start: 2022-01-01 | End: 2023-01-01 | Stop reason: HOSPADM

## 2022-01-01 RX ORDER — BISACODYL 10 MG
10 SUPPOSITORY, RECTAL RECTAL DAILY PRN
Status: DISCONTINUED | OUTPATIENT
Start: 2022-01-01 | End: 2023-01-01 | Stop reason: HOSPADM

## 2022-01-01 RX ORDER — ONDANSETRON 2 MG/ML
4 INJECTION INTRAMUSCULAR; INTRAVENOUS EVERY 6 HOURS PRN
Status: DISCONTINUED | OUTPATIENT
Start: 2022-01-01 | End: 2023-01-01 | Stop reason: HOSPADM

## 2022-01-01 RX ORDER — ASCORBIC ACID 500 MG
500 TABLET ORAL DAILY
COMMUNITY

## 2022-01-01 RX ADMIN — APIXABAN 5 MG: 5 TABLET, FILM COATED ORAL at 20:09

## 2022-01-01 RX ADMIN — LEVOTHYROXINE SODIUM 50 MCG: 50 TABLET ORAL at 09:07

## 2022-01-01 RX ADMIN — DICLOFENAC SODIUM 4 G: 10 GEL TOPICAL at 17:53

## 2022-01-01 RX ADMIN — ACETAMINOPHEN 975 MG: 325 TABLET, FILM COATED ORAL at 04:52

## 2022-01-01 RX ADMIN — Medication 1 CAPSULE: at 20:07

## 2022-01-01 RX ADMIN — BENZONATATE 100 MG: 100 CAPSULE ORAL at 03:14

## 2022-01-01 RX ADMIN — DICLOFENAC SODIUM 4 G: 10 GEL TOPICAL at 12:47

## 2022-01-01 RX ADMIN — Medication 1 CAPSULE: at 09:24

## 2022-01-01 RX ADMIN — OXYCODONE HYDROCHLORIDE 2.5 MG: 5 TABLET ORAL at 08:14

## 2022-01-01 RX ADMIN — APIXABAN 5 MG: 5 TABLET, FILM COATED ORAL at 09:07

## 2022-01-01 RX ADMIN — MIRABEGRON 50 MG: 50 TABLET, FILM COATED, EXTENDED RELEASE ORAL at 08:30

## 2022-01-01 RX ADMIN — Medication 1 CAPSULE: at 09:07

## 2022-01-01 RX ADMIN — ACETAMINOPHEN 650 MG: 325 TABLET, FILM COATED ORAL at 09:49

## 2022-01-01 RX ADMIN — SODIUM CHLORIDE 50 ML: 9 INJECTION, SOLUTION INTRAVENOUS at 01:53

## 2022-01-01 RX ADMIN — Medication 1 CAPSULE: at 20:09

## 2022-01-01 RX ADMIN — ACETAMINOPHEN 650 MG: 325 TABLET, FILM COATED ORAL at 06:29

## 2022-01-01 RX ADMIN — LEVOTHYROXINE SODIUM 50 MCG: 50 TABLET ORAL at 08:29

## 2022-01-01 RX ADMIN — DICLOFENAC SODIUM 4 G: 10 GEL TOPICAL at 09:08

## 2022-01-01 RX ADMIN — MIRABEGRON 50 MG: 50 TABLET, FILM COATED, EXTENDED RELEASE ORAL at 09:08

## 2022-01-01 RX ADMIN — Medication 1 CAPSULE: at 09:08

## 2022-01-01 RX ADMIN — Medication 1 CAPSULE: at 21:09

## 2022-01-01 RX ADMIN — METOPROLOL TARTRATE 12.5 MG: 25 TABLET, FILM COATED ORAL at 09:07

## 2022-01-01 RX ADMIN — METOPROLOL TARTRATE 25 MG: 25 TABLET, FILM COATED ORAL at 20:33

## 2022-01-01 RX ADMIN — Medication 1 CAPSULE: at 08:29

## 2022-01-01 RX ADMIN — APIXABAN 5 MG: 5 TABLET, FILM COATED ORAL at 20:43

## 2022-01-01 RX ADMIN — METOPROLOL TARTRATE 12.5 MG: 25 TABLET, FILM COATED ORAL at 20:09

## 2022-01-01 RX ADMIN — APIXABAN 5 MG: 5 TABLET, FILM COATED ORAL at 08:34

## 2022-01-01 RX ADMIN — METOPROLOL TARTRATE 25 MG: 25 TABLET, FILM COATED ORAL at 09:38

## 2022-01-01 RX ADMIN — METOPROLOL TARTRATE 25 MG: 25 TABLET, FILM COATED ORAL at 08:13

## 2022-01-01 RX ADMIN — ALBUTEROL SULFATE 2 PUFF: 90 AEROSOL, METERED RESPIRATORY (INHALATION) at 17:53

## 2022-01-01 RX ADMIN — ACETAMINOPHEN 975 MG: 325 TABLET, FILM COATED ORAL at 21:09

## 2022-01-01 RX ADMIN — ACETAMINOPHEN 975 MG: 325 TABLET, FILM COATED ORAL at 04:25

## 2022-01-01 RX ADMIN — OXYCODONE HYDROCHLORIDE 2.5 MG: 5 TABLET ORAL at 03:15

## 2022-01-01 RX ADMIN — MIRABEGRON 50 MG: 50 TABLET, FILM COATED, EXTENDED RELEASE ORAL at 09:24

## 2022-01-01 RX ADMIN — Medication 1 CAPSULE: at 20:53

## 2022-01-01 RX ADMIN — METOPROLOL TARTRATE 25 MG: 25 TABLET, FILM COATED ORAL at 08:34

## 2022-01-01 RX ADMIN — APIXABAN 5 MG: 5 TABLET, FILM COATED ORAL at 20:33

## 2022-01-01 RX ADMIN — LEVOTHYROXINE SODIUM 50 MCG: 50 TABLET ORAL at 09:39

## 2022-01-01 RX ADMIN — APIXABAN 5 MG: 5 TABLET, FILM COATED ORAL at 08:29

## 2022-01-01 RX ADMIN — METOPROLOL TARTRATE 25 MG: 25 TABLET, FILM COATED ORAL at 20:53

## 2022-01-01 RX ADMIN — DICLOFENAC SODIUM 4 G: 10 GEL TOPICAL at 16:47

## 2022-01-01 RX ADMIN — DICLOFENAC SODIUM 4 G: 10 GEL TOPICAL at 12:23

## 2022-01-01 RX ADMIN — LEVOTHYROXINE SODIUM 50 MCG: 50 TABLET ORAL at 09:08

## 2022-01-01 RX ADMIN — LEVOTHYROXINE SODIUM 50 MCG: 50 TABLET ORAL at 09:24

## 2022-01-01 RX ADMIN — REMDESIVIR 100 MG: 100 INJECTION, POWDER, LYOPHILIZED, FOR SOLUTION INTRAVENOUS at 01:48

## 2022-01-01 RX ADMIN — DICLOFENAC SODIUM 4 G: 10 GEL TOPICAL at 20:54

## 2022-01-01 RX ADMIN — DICLOFENAC SODIUM 4 G: 10 GEL TOPICAL at 15:39

## 2022-01-01 RX ADMIN — OXYCODONE HYDROCHLORIDE 5 MG: 5 TABLET ORAL at 03:55

## 2022-01-01 RX ADMIN — APIXABAN 5 MG: 5 TABLET, FILM COATED ORAL at 09:24

## 2022-01-01 RX ADMIN — APIXABAN 5 MG: 5 TABLET, FILM COATED ORAL at 09:08

## 2022-01-01 RX ADMIN — ACETAMINOPHEN 975 MG: 325 TABLET, FILM COATED ORAL at 14:32

## 2022-01-01 RX ADMIN — APIXABAN 5 MG: 5 TABLET, FILM COATED ORAL at 20:53

## 2022-01-01 RX ADMIN — SODIUM CHLORIDE 50 ML: 9 INJECTION, SOLUTION INTRAVENOUS at 04:30

## 2022-01-01 RX ADMIN — ACETAMINOPHEN 650 MG: 325 TABLET, FILM COATED ORAL at 06:05

## 2022-01-01 RX ADMIN — DICLOFENAC SODIUM 4 G: 10 GEL TOPICAL at 11:50

## 2022-01-01 RX ADMIN — BENZONATATE 100 MG: 100 CAPSULE ORAL at 13:00

## 2022-01-01 RX ADMIN — METOPROLOL TARTRATE 12.5 MG: 25 TABLET, FILM COATED ORAL at 08:29

## 2022-01-01 RX ADMIN — DICLOFENAC SODIUM 4 G: 10 GEL TOPICAL at 12:28

## 2022-01-01 RX ADMIN — METOPROLOL TARTRATE 25 MG: 25 TABLET, FILM COATED ORAL at 20:43

## 2022-01-01 RX ADMIN — REMDESIVIR 100 MG: 100 INJECTION, POWDER, LYOPHILIZED, FOR SOLUTION INTRAVENOUS at 01:06

## 2022-01-01 RX ADMIN — ACETAMINOPHEN 975 MG: 325 TABLET, FILM COATED ORAL at 20:11

## 2022-01-01 RX ADMIN — DICLOFENAC SODIUM 4 G: 10 GEL TOPICAL at 12:36

## 2022-01-01 RX ADMIN — DICLOFENAC SODIUM 4 G: 10 GEL TOPICAL at 18:13

## 2022-01-01 RX ADMIN — LEVOTHYROXINE SODIUM 50 MCG: 50 TABLET ORAL at 08:13

## 2022-01-01 RX ADMIN — DICLOFENAC SODIUM 4 G: 10 GEL TOPICAL at 20:43

## 2022-01-01 RX ADMIN — OXYCODONE HYDROCHLORIDE 2.5 MG: 5 TABLET ORAL at 09:42

## 2022-01-01 RX ADMIN — MIRABEGRON 50 MG: 50 TABLET, FILM COATED, EXTENDED RELEASE ORAL at 08:14

## 2022-01-01 RX ADMIN — DICLOFENAC SODIUM 4 G: 10 GEL TOPICAL at 09:34

## 2022-01-01 RX ADMIN — APIXABAN 5 MG: 5 TABLET, FILM COATED ORAL at 09:39

## 2022-01-01 RX ADMIN — POLYETHYLENE GLYCOL 3350 17 G: 17 POWDER, FOR SOLUTION ORAL at 08:14

## 2022-01-01 RX ADMIN — DICLOFENAC SODIUM 4 G: 10 GEL TOPICAL at 17:07

## 2022-01-01 RX ADMIN — APIXABAN 5 MG: 5 TABLET, FILM COATED ORAL at 21:09

## 2022-01-01 RX ADMIN — MIRABEGRON 50 MG: 50 TABLET, FILM COATED, EXTENDED RELEASE ORAL at 09:06

## 2022-01-01 RX ADMIN — MIRABEGRON 50 MG: 50 TABLET, FILM COATED, EXTENDED RELEASE ORAL at 09:39

## 2022-01-01 RX ADMIN — SODIUM CHLORIDE 500 ML: 9 INJECTION, SOLUTION INTRAVENOUS at 02:30

## 2022-01-01 RX ADMIN — DICLOFENAC SODIUM 4 G: 10 GEL TOPICAL at 20:10

## 2022-01-01 RX ADMIN — ACETAMINOPHEN 975 MG: 325 TABLET, FILM COATED ORAL at 12:47

## 2022-01-01 RX ADMIN — ACETAMINOPHEN 650 MG: 325 TABLET, FILM COATED ORAL at 16:47

## 2022-01-01 RX ADMIN — REMDESIVIR 200 MG: 100 INJECTION, POWDER, LYOPHILIZED, FOR SOLUTION INTRAVENOUS at 03:14

## 2022-01-01 RX ADMIN — APIXABAN 5 MG: 5 TABLET, FILM COATED ORAL at 08:13

## 2022-01-01 RX ADMIN — ACETAMINOPHEN 650 MG: 325 TABLET, FILM COATED ORAL at 14:45

## 2022-01-01 RX ADMIN — METOPROLOL TARTRATE 12.5 MG: 25 TABLET, FILM COATED ORAL at 09:08

## 2022-01-01 RX ADMIN — OXYCODONE HYDROCHLORIDE 2.5 MG: 5 TABLET ORAL at 06:05

## 2022-01-01 RX ADMIN — ACETAMINOPHEN 975 MG: 325 TABLET, FILM COATED ORAL at 12:00

## 2022-01-01 RX ADMIN — ACETAMINOPHEN 975 MG: 325 TABLET, FILM COATED ORAL at 20:55

## 2022-01-01 RX ADMIN — LEVOTHYROXINE SODIUM 50 MCG: 50 TABLET ORAL at 08:34

## 2022-01-01 RX ADMIN — MIRABEGRON 50 MG: 50 TABLET, FILM COATED, EXTENDED RELEASE ORAL at 08:34

## 2022-01-01 RX ADMIN — DICLOFENAC SODIUM 4 G: 10 GEL TOPICAL at 20:08

## 2022-01-01 RX ADMIN — SODIUM CHLORIDE 50 ML: 9 INJECTION, SOLUTION INTRAVENOUS at 01:06

## 2022-01-01 RX ADMIN — DICLOFENAC SODIUM 4 G: 10 GEL TOPICAL at 08:35

## 2022-01-01 RX ADMIN — APIXABAN 5 MG: 5 TABLET, FILM COATED ORAL at 20:55

## 2022-01-01 RX ADMIN — ACETAMINOPHEN 650 MG: 325 TABLET, FILM COATED ORAL at 13:00

## 2022-01-01 RX ADMIN — DICLOFENAC SODIUM 4 G: 10 GEL TOPICAL at 15:45

## 2022-01-01 RX ADMIN — METOPROLOL TARTRATE 12.5 MG: 25 TABLET, FILM COATED ORAL at 20:55

## 2022-01-01 RX ADMIN — APIXABAN 5 MG: 5 TABLET, FILM COATED ORAL at 20:07

## 2022-01-01 RX ADMIN — ACETAMINOPHEN 975 MG: 325 TABLET, FILM COATED ORAL at 04:00

## 2022-01-01 RX ADMIN — ACETAMINOPHEN 975 MG: 325 TABLET, FILM COATED ORAL at 20:09

## 2022-01-01 RX ADMIN — METOPROLOL TARTRATE 12.5 MG: 25 TABLET, FILM COATED ORAL at 21:09

## 2022-01-01 ASSESSMENT — ACTIVITIES OF DAILY LIVING (ADL)
ADLS_ACUITY_SCORE: 53
ADLS_ACUITY_SCORE: 46
ADLS_ACUITY_SCORE: 46
VISION_MANAGEMENT: GLASSES
ADLS_ACUITY_SCORE: 51
ADLS_ACUITY_SCORE: 50
WALKING_OR_CLIMBING_STAIRS_DIFFICULTY: YES
ADLS_ACUITY_SCORE: 47
CONCENTRATING,_REMEMBERING_OR_MAKING_DECISIONS_DIFFICULTY: NO
ADLS_ACUITY_SCORE: 51
ADLS_ACUITY_SCORE: 51
ADLS_ACUITY_SCORE: 47
ADLS_ACUITY_SCORE: 54
DRESS: 1-->ASSISTANCE (EQUIPMENT/PERSON) NEEDED
ADLS_ACUITY_SCORE: 51
ADLS_ACUITY_SCORE: 50
ADLS_ACUITY_SCORE: 54
CHANGE_IN_FUNCTIONAL_STATUS_SINCE_ONSET_OF_CURRENT_ILLNESS/INJURY: YES
ADLS_ACUITY_SCORE: 54
ADLS_ACUITY_SCORE: 50
TOILETING_ISSUES: NO
ADLS_ACUITY_SCORE: 46
DIFFICULTY_EATING/SWALLOWING: NO
ADLS_ACUITY_SCORE: 51
ADLS_ACUITY_SCORE: 53
ADLS_ACUITY_SCORE: 51
ADLS_ACUITY_SCORE: 47
ADLS_ACUITY_SCORE: 53
ADLS_ACUITY_SCORE: 53
ADLS_ACUITY_SCORE: 50
ADLS_ACUITY_SCORE: 53
ADLS_ACUITY_SCORE: 51
ADLS_ACUITY_SCORE: 51
WEAR_GLASSES_OR_BLIND: YES
ADLS_ACUITY_SCORE: 51
ADLS_ACUITY_SCORE: 51
ADLS_ACUITY_SCORE: 48
ADLS_ACUITY_SCORE: 51
ADLS_ACUITY_SCORE: 47
ADLS_ACUITY_SCORE: 53
ADLS_ACUITY_SCORE: 51
ADLS_ACUITY_SCORE: 51
ADLS_ACUITY_SCORE: 53
ADLS_ACUITY_SCORE: 51
ADLS_ACUITY_SCORE: 53
ADLS_ACUITY_SCORE: 53
ADLS_ACUITY_SCORE: 39
ADLS_ACUITY_SCORE: 49
ADLS_ACUITY_SCORE: 50
ADLS_ACUITY_SCORE: 51
ADLS_ACUITY_SCORE: 53
ADLS_ACUITY_SCORE: 56
ADLS_ACUITY_SCORE: 46
ADLS_ACUITY_SCORE: 53
ADLS_ACUITY_SCORE: 49
ADLS_ACUITY_SCORE: 51
ADLS_ACUITY_SCORE: 53
ADLS_ACUITY_SCORE: 51
ADLS_ACUITY_SCORE: 53
ADLS_ACUITY_SCORE: 51
ADLS_ACUITY_SCORE: 53
ADLS_ACUITY_SCORE: 51
DRESSING/BATHING_DIFFICULTY: YES
ADLS_ACUITY_SCORE: 53
ADLS_ACUITY_SCORE: 51
ADLS_ACUITY_SCORE: 50
ADLS_ACUITY_SCORE: 51
DRESSING/BATHING: BATHING DIFFICULTY, REQUIRES EQUIPMENT
ADLS_ACUITY_SCORE: 49
DOING_ERRANDS_INDEPENDENTLY_DIFFICULTY: YES
ADLS_ACUITY_SCORE: 56
ADLS_ACUITY_SCORE: 53
ADLS_ACUITY_SCORE: 51
ADLS_ACUITY_SCORE: 51
TRANSFERRING: 1-->ASSISTANCE (EQUIPMENT/PERSON) NEEDED (NOT DEVELOPMENTALLY APPROPRIATE)
WALKING_OR_CLIMBING_STAIRS: AMBULATION DIFFICULTY, REQUIRES EQUIPMENT
ADLS_ACUITY_SCORE: 46
ADLS_ACUITY_SCORE: 46
ADLS_ACUITY_SCORE: 49
ADLS_ACUITY_SCORE: 53
ADLS_ACUITY_SCORE: 50
ADLS_ACUITY_SCORE: 51
ADLS_ACUITY_SCORE: 51
ADLS_ACUITY_SCORE: 53
DRESS: 1-->ASSISTANCE (EQUIPMENT/PERSON) NEEDED (NOT DEVELOPMENTALLY APPROPRIATE)
ADLS_ACUITY_SCORE: 53
ADLS_ACUITY_SCORE: 51
ADLS_ACUITY_SCORE: 53
TRANSFERRING: 1-->ASSISTANCE (EQUIPMENT/PERSON) NEEDED
BATHING: 1-->ASSISTANCE NEEDED
ADLS_ACUITY_SCORE: 50
ADLS_ACUITY_SCORE: 53
ADLS_ACUITY_SCORE: 51

## 2022-01-01 ASSESSMENT — ENCOUNTER SYMPTOMS
WEAKNESS: 1
COUGH: 1
SHORTNESS OF BREATH: 1

## 2022-06-27 NOTE — PROGRESS NOTES
ANTICOAGULATION FOLLOW-UP CLINIC VISIT    Patient Name:  Ham Marie  Date:  1/19/2017  Contact Type:  Face to Face    SUBJECTIVE:        OBJECTIVE    INR PROTIME   Date Value Ref Range Status   01/19/2017 4.2* 0.86 - 1.14 Final       ASSESSMENT / PLAN  INR assessment SUPRA    Recheck INR In: 2 WEEKS    INR Location Clinic      Anticoagulation Summary as of 1/19/2017     INR goal 2.0-3.0   Selected INR 4.2! (1/19/2017)   Maintenance plan 5 mg (2.5 mg x 2) every day   Full instructions 1/19: Hold; Otherwise 5 mg every day   Weekly total 35 mg   Plan last modified Criselda Barfield RN (3/24/2016)   Next INR check 2/2/2017   Priority INR   Target end date     Indications   Long-term (current) use of anticoagulants [Z79.01] [Z79.01]  Pulmonary embolism and infarction (H) [I26.99]  Pulmonary embolism and infarction (H) (Resolved) [I26.99]         Anticoagulation Episode Summary     INR check location     Preferred lab     Send INR reminders to CS ANTICOAGULATION    Comments       Anticoagulation Care Providers     Provider Role Specialty Phone number    Cuba Marie MD [facility] Internal Medicine [phone]            See the Encounter Report to view Anticoagulation Flowsheet and Dosing Calendar (Go to Encounters tab in chart review, and find the Anticoagulation Therapy Visit)    Dosage adjustment made based on physician directed care plan.    Criselda Barfield, RN                 
24-Jun-2022 17:19

## 2022-12-25 NOTE — ED NOTES
Bed: ED25  Expected date:   Expected time:   Means of arrival:   Comments:  431 89F generalized weakness COVID+

## 2022-12-25 NOTE — PROGRESS NOTES
PRIMARY DIAGNOSIS: GENERALIZED WEAKNESS     OUTPATIENT/OBSERVATION GOALS TO BE MET BEFORE DISCHARGE  1. Orthostatic performed: N/A     2. Tolerating PO medications: Met     3. Return to near baseline physical activity: Not Met     4. Cleared for discharge by consultants (if involved): No     Discharge Planner Nurse   Safe discharge environment identified: No  Barriers to discharge: Yes  Please review provider order for any additional goals.   Nurse to notify provider when observation goals have been met and patient is ready for discharge.           To get better and follow your care plan as instructed.

## 2022-12-25 NOTE — ED TRIAGE NOTES
"Patient BIBA; patient C/O generalized weaknesses, \"cold\" like symptoms, and inability to walk to bathroom. Pt took Nyquil at 2pm, per EMS. Pt's daughter helped patient to the floor when unable to walk to bathroom. Pt's daughter did rapid COVID test at home that was positive.    Pt lives at home, independently, per EMS. VSS, onset of symptoms yesterday.       "

## 2022-12-25 NOTE — PLAN OF CARE
PRIMARY DIAGNOSIS: GENERALIZED WEAKNESS    OUTPATIENT/OBSERVATION GOALS TO BE MET BEFORE DISCHARGE  1. Orthostatic performed: N/A    2. Tolerating PO medications: Yes    3. Return to near baseline physical activity: No    4. Cleared for discharge by consultants (if involved): No    Discharge Planner Nurse   Safe discharge environment identified: No  Barriers to discharge: Yes       Entered by: Ariane Myers RN 12/25/2022 2:23 PM     Please review provider order for any additional goals.   Nurse to notify provider when observation goals have been met and patient is ready for discharge.

## 2022-12-25 NOTE — PLAN OF CARE
PRIMARY DIAGNOSIS: GENERALIZED WEAKNESS    OUTPATIENT/OBSERVATION GOALS TO BE MET BEFORE DISCHARGE  1. Orthostatic performed: N/A    2. Tolerating PO medications: Yes    3. Return to near baseline physical activity: No    4. Cleared for discharge by consultants (if involved): No    Discharge Planner Nurse   Safe discharge environment identified: No  Barriers to discharge: Yes       Entered by: Ariane Myers RN 12/25/2022 9:47 AM     Please review provider order for any additional goals.   Nurse to notify provider when observation goals have been met and patient is ready for discharge.

## 2022-12-25 NOTE — PROGRESS NOTES
"[facility]    Medicine Progress Note - Hospitalist Service    Date of Admission:  12/24/2022    Assessment & Plan      Ham Marie is a 89 year old female who presented to 12/24/2022 with generalized weakness due to acute COVID-19 infection     # Confirmed COVID-19 infection    Describes raspy throat and cough 12/23.  Positive home COVID test     Symptom Onset 12/23/2022   Date of 1st Positive Test 12/24/2022   Vaccination Status Fully Vaccinated         - COVID-19 special precautions, continuous pulse-ox  - Oxygen: Patient is not currently requiring supplemental oxygen.  If needed, titrate to keep SpO2 between 90-96%  - Labs: BMP ok, CRP 35, LFTs ok, CBC ok,  INR 1.4  - Imaging: CXR 12/25 -mild bibasilar atelectasis.  No focal infiltrate.    - Breathing treatments: no inhalers needed; avoid nebulizers in favor of MDIs   - IV fluids: 500 mL bolus on admission.  No other fluids needed.  - Antibiotics: not indicated   - COVID-Focused Medications: Remdesivir x 3 days or until hospital discharge, started on 12/25/22  - DVT Prophylaxis:         - At high risk of thrombotic complications due to COVID-19 (DDimer = N/A )         - Already on therapeutic anticoagulation with Eliquis     Generalized weakness and physical deconditioning:   Currently lives at home independently.  Grandson lives at her home as well, though leaves for work during the day.  Patient recognizes that her living situation had already become tenuous, daughter was in the process of looking for care facilities prior to this hospitalization.  -Care management consultation for disposition planning  -Physical therapy consulted     Unstageable sacral pressure ulceration: Present on admission.  Patient reports that she has had a pressure ulceration of for \"years.\"  -Offloading, frequent mobilization.    -Physical therapy consulted  -Wound nurse consult     History of DVT, pulmonary embolism:  -Continue prior to admission " "Eliquis anticoagulation     Chronic atrial fibrillation:  -Continue prior to admission Eliquis anticoagulation  -Continue prior to admission metoprolol tartrate 25 mg twice daily     Urinary frequency  -Continue prior to admission Myrbetriq    Right knee pain and swelling  -Patient denies any fall or trauma to her right knee.  Right knee is mildly swollen, warm and slightly tender to touch  -X-ray showed no acute fractures or malalignment.  Showed moderate to severe medial compartment degenerative changes  -No known history of gout       Diet: Combination Diet Regular Diet Adult    DVT Prophylaxis: DOAC  Barney Catheter: Not present  Central Lines: None  Cardiac Monitoring: None  Code Status: No CPR- Do NOT Intubate      Disposition Plan      Expected Discharge Date: 12/26/2022                The patient's care was discussed with the Patient and Patient's Family.    Laurie Archibald MD  Hospitalist Service  [facility]  Securely message with the Vocera Web Console (learn more here)  Text page via [employer] Paging/Directory         Clinically Significant Risk Factors Present on Admission         # Hyponatremia: Lowest Na = 133 mmol/L in last 2 days, will monitor as appropriate      # Hypoalbuminemia: Lowest albumin = 3.2 g/dL at 12/24/2022 11:21 PM, will monitor as appropriate  # Drug Induced Coagulation Defect: home medication list includes an anticoagulant medication         # Overweight: Estimated body mass index is 28.75 kg/m  as calculated from the following:    Height as of this encounter: 1.6 m (5' 3\").    Weight as of this encounter: 73.6 kg (162 lb 4.8 oz).           ______________________________________________________________________    Interval History   Patient reports she feels better than yesterday.  Still weak.  Complains of right knee pain.  Otherwise has improved    Data reviewed today: I reviewed all medications, new labs and imaging results over the last 24 hours. I personally " reviewed no images or EKG's today.    Physical Exam   Vital Signs: Temp: 98.7  F (37.1  C) Temp src: Oral BP: 121/70 Pulse: 61   Resp: 20 SpO2: 93 % O2 Device: None (Room air)    Weight: 162 lbs 4.8 oz    Constitutional- patient is awake and alert, resting in bed, in no acute distress  Cardiovascular- Irregular rate and rhythm, no murmurs, no edema  Pulmonary-lungs are clear to auscultation bilaterally, no wheezing or rhonchi  GI-abdomen is soft, nontender, nondistended, no hepatosplenomegaly or masses  Integumentary-skin is warm and dry, no rashes or ulcers  Neurological-patient is awake, alert and oriented x3.  Moving all 4 extremities, normal speech, no focal deficits  Musculoskeletal - right knee is mildly warm and tender on medial side    Data   Recent Labs   Lab 12/24/22 2323 12/24/22 2321   WBC  --  9.5   HGB  --  12.5   MCV  --  102*   PLT  --  212   INR 1.40*  --    NA  --  133   POTASSIUM  --  4.4   CHLORIDE  --  101   CO2  --  27   BUN  --  24   CR  --  0.80   ANIONGAP  --  5   APURVA  --  9.3   GLC  --  115*   ALBUMIN  --  3.2*   PROTTOTAL  --  7.2   BILITOTAL  --  1.0   ALKPHOS  --  74   ALT  --  15   AST  --  20     Recent Results (from the past 24 hour(s))   Chest XR,  PA & LAT    Narrative    EXAM: XR CHEST 2 VIEWS  LOCATION: [facility]  DATE/TIME: 12/25/2022 12:35 AM    INDICATION: Cough.  COMPARISON: 03/14/2011      Impression    IMPRESSION:     Mild bibasilar atelectasis. Linear atelectasis or scarring at the left midlung. No focal airspace disease. No pleural effusion or pneumothorax.    Mild cardiomegaly. Aortic calcifications.    Multilevel degenerative changes of the spine.   XR Knee Port Bilateral 1/2 Views    Narrative    EXAM: XR KNEE PORT BILATERAL 1/2 VIEWS  LOCATION: [facility]  DATE/TIME: 12/25/2022 10:33 AM    INDICATION: assisted fall, knee pain  COMPARISON: None.      Impression    IMPRESSION:   RIGHT KNEE: No acute fracture or  malalignment. Severe medial compartment degenerative changes with bone-on-bone articulation. Mild patellofemoral compartment marginal bony spurring. Small knee joint effusion. Mild chondrocalcinosis in the lateral   compartment. Osteopenia. Atherosclerosis.    LEFT KNEE: No acute fracture or malalignment. Moderate to severe medial compartment degenerative changes. No knee joint effusion. Osteopenia. Atherosclerosis.     Medications     - MEDICATION INSTRUCTIONS -         [START ON 12/26/2022] remdesivir  100 mg Intravenous Q24H    And     [START ON 12/26/2022] sodium chloride 0.9%  50 mL Intravenous Q24H     apixaban ANTICOAGULANT  5 mg Oral BID     levothyroxine  50 mcg Oral Daily     metoprolol tartrate  25 mg Oral BID     mirabegron  50 mg Oral Daily     sodium chloride (PF)  3 mL Intracatheter Q8H

## 2022-12-25 NOTE — ED NOTES
"[facility]  ED Nurse Handoff Report    ED Chief complaint: Generalized Weakness (Rapid home COVID test positive)      ED Diagnosis:   Final diagnoses:   Infection due to 2019 novel coronavirus       Code Status: Admitting MD to establish with patient/family.    Allergies:   Allergies   Allergen Reactions    Erythromycin      upsets stomach    Zocor [Simvastatin - High Dose] Rash       Patient Story: Patient BIBA; patient C/O generalized weaknesses, \"cold\" like symptoms, and inability to walk to bathroom. Pt took Nyquil at 2pm, per EMS. Pt's daughter helped patient to the floor when unable to walk to bathroom. Pt's daughter did rapid COVID test at home that was positive.     Pt lives at home, independently, per EMS. VSS, onset of symptoms yesterday.     Focused Assessment:  VSS; cough, AxOx3, weak    Treatments and/or interventions provided: Labs, imaging    Patient's response to treatments and/or interventions: See results.    To be done/followed up on inpatient unit:  See orders.    Does this patient have any cognitive concerns?: Forgetful    Activity level - Baseline/Home:  Walker  Activity Level - Current:   Stand with Assist and Stand with assist x2    Patient's Preferred language: English   Needed?: Yes    Isolation: COVID r/o and special precautions  Infection: COVID r/o and special precautions  Patient tested for COVID 19 prior to admission: YES  Bariatric?: No    Vital Signs:   Vitals:    12/24/22 2315 12/25/22 0000 12/25/22 0015   BP: (!) 155/87 (!) 155/83    Pulse: 65 67    Resp: 20     Temp: 97.7  F (36.5  C)     TempSrc: Oral     SpO2: 96% 96% 91%   Weight: 75 kg (165 lb 5.5 oz)     Height: 1.549 m (5' 1\")         Cardiac Rhythm:     Was the PSS-3 completed:   Yes  What interventions are required if any?               Family Comments:   OBS brochure/video discussed/provided to patient/family: N/A              Name of person given brochure if not patient:               " Relationship to patient:     For the majority of the shift this patient's behavior was Green.   No behavioral interventions performed.    ED NURSE PHONE NUMBER: [phone]

## 2022-12-25 NOTE — PROGRESS NOTES
RECEIVING UNIT ED HANDOFF REVIEW    ED Nurse Handoff Report was reviewed by: Jeannie Alva RN on December 25, 2022 at 1:37 AM

## 2022-12-25 NOTE — PHARMACY-ADMISSION MEDICATION HISTORY
Pharmacy Medication History  Admission medication history interview status for the 12/24/2022  admission is complete. See EPIC admission navigator for prior to admission medications     Location of Interview: Patient room  Medication history sources: Patient, Patient's family/friend (daughter Lakshmi ) and Surescripts    Significant changes made to the medication list:  Updated apixaban, levothyroxine dose  Added metoprolol    In the past week, patient estimated taking medication this percent of the time: greater than 90%    Additional medication history information:   None    Medication reconciliation completed by provider prior to medication history? No    Time spent in this activity: 10 min     Prior to Admission medications    Medication Sig Last Dose Taking? Auth Provider Long Term End Date   apixaban ANTICOAGULANT (ELIQUIS) 5 MG tablet Take 5 mg by mouth 2 times daily 12/24/2022 Yes Unknown, Entered By History     calcium carbonate (OSCAL 500) 1250 (500 CA) MG TABS Take 1 tablet (1,250 mg) by mouth 2 times daily 12/24/2022 Yes Cuba Marie MD     cholecalciferol (VITAMIN D) 1000 UNIT tablet Take 1 tablet (1,000 Units) by mouth daily 12/24/2022 Yes Cuba Marie MD     levothyroxine (SYNTHROID/LEVOTHROID) 50 MCG tablet Take 50 mcg by mouth daily 12/24/2022 Yes Unknown, Entered By History Yes    magnesium 250 MG tablet Take 1 tablet by mouth daily. 12/24/2022 Yes Reported, Patient     metoprolol tartrate (LOPRESSOR) 25 MG tablet Take 25 mg by mouth 2 times daily 12/24/2022 Yes Unknown, Entered By History Yes    mirabegron (MYRBETRIQ) 50 MG 24 hr tablet Take 1 tablet (50 mg) by mouth daily 12/24/2022 Yes Cuba Marie MD     Multiple Vitamins-Minerals (PRESERVISION AREDS PO) Take 1 tablet by mouth 2 times daily 12/24/2022 Yes Reported, Patient     MULTIVITAMIN TABS   OR Take 1 tablet by mouth daily 12/24/2022 Yes Reported, Patient     vitamin C  (ASCORBIC ACID) 500 MG tablet Take 500 mg by mouth daily 12/24/2022 Yes Unknown, Entered By History       The information provided in this note is only as accurate as the sources available at the time of update(s)     Randi Martel, RachidD, BCPS

## 2022-12-25 NOTE — ED NOTES
RN called and updated patient's daughter, Lakshmi. No additional questions or concerns at this time.     Lakshmi Martinez  [phone]

## 2022-12-25 NOTE — PLAN OF CARE
Orientation/Cognitive: A&O forgetful; [place]  Observation Goals (Met/ Not Met): Not met  Mobility Level/Assist Equipment: A1/walker/gb pivot to chair. Repo   Fall Risk (Y/N):Yes  Behavior Concerns: Calm/Cooperative  Pain Management: C/o R knee pain PRN oxy and tylenol. Edema to bilat knees  Tele/VS/O2: HR paulette at times. All other VSS. On RA  ABNL Lab/BG: Covid positive. CRP:35.1  Diet: Regular  Bowel/Bladder: voiding, purewick in place. Can be incontinent of stool   Skin Concerns:Bilat knee edema and ankle edema, scattered bruising; blanchable redness/bruising to sacrum, mepilex in place.   Drains/Devices:PIV:S/L Purewick  Tests/Procedures for next shift: Ortho consult. PT/SW  Anticipated DC date & active delays: 1-2 days  Patient Stated Goal for Today: for the knee not to hurt. Rest

## 2022-12-25 NOTE — PROGRESS NOTES
"PRIMARY DIAGNOSIS: \"GENERIC\" NURSING  OUTPATIENT/OBSERVATION GOALS TO BE MET BEFORE DISCHARGE:  1. ADLs back to baseline: No    2. Activity and level of assistance: Up with maximum assistance. Consider SW and/or PT evaluation.     3. Pain status: Improved-controlled with oral pain medications.    4. Return to near baseline physical activity: No     Discharge Planner Nurse   Safe discharge environment identified: No  Barriers to discharge: Yes       Entered by: Jeannie Alva RN 12/25/2022 3:52 AM     Please review provider order for any additional goals.   Nurse to notify provider when observation goals have been met and patient is ready for discharge.PRIMARY DIAGNOSIS:   "

## 2022-12-25 NOTE — H&P
[facility]    History and Physical - Hospitalist Service       Date of Admission:  12/24/2022    Assessment & Plan      Ham Marie is a 89 year old female admitted on 12/24/2022. She presents to the emergency department with generalized weakness in the setting of acute COVID-19 infection    # Confirmed COVID-19 infection    Describes raspy throat and cough 12/23.  Positive home COVID test     Symptom Onset 12/23/2022   Date of 1st Positive Test 12/24/2022   Vaccination Status Fully Vaccinated       - COVID-19 special precautions, continuous pulse-ox  - Oxygen: Patient is not currently requiring supplemental oxygen.  If needed, titrate to keep SpO2 between 90-96%  - Labs: Standard COVID admission labs ordered (CBC with diff, CMP, INR, D-dimer, CRP).   - Imaging: no additional imaging needed at this time  - Breathing treatments: no inhalers needed; avoid nebulizers in favor of MDIs   - IV fluids: 500 mL bolus.  Monitor intake and output as suspect some degree of volume depletion with poor intake over the past 24 hours  - Antibiotics: not indicated   - COVID-Focused Medications: Remdesivir x 3 days or until hospital discharge, started on 12/25/22  - DVT Prophylaxis:         - At high risk of thrombotic complications due to COVID-19 (DDimer = N/A )         - Already on therapeutic anticoagulation with Eliquis    Generalized weakness and physical deconditioning: Currently lives at home independently.  Grandson lives at her home as well, though leaves for work during the day.  Patient recognizes that her living situation had already become tenuous, and tells me that her daughter was in the process of looking for care facilities prior to this hospitalization.  -Care management consultation for disposition planning  -Physical therapy consulted    Unstageable sacral pressure ulceration: Present on admission.  Patient reports that she has had a pressure ulceration of for  "\"years.\"  -Offloading, frequent mobilization.  Discussed the importance of this with patient on admission  -Physical therapy consulted  -Wound nurse consult    History of DVT, pulmonary embolism:  -Continue prior to admission Eliquis anticoagulation    Chronic atrial fibrillation:  -Continue prior to admission Eliquis anticoagulation  -Continue prior to admission metoprolol tartrate 25 mg twice daily    Urinary frequency  -Continue prior to admission Myrbetriq        Diet:  Regular adult diet as tolerated  DVT Prophylaxis: Continue prior to admission Eliquis anticoagulation  Barney Catheter: Not present  Central Lines: None  Cardiac Monitoring: None  Code Status:   DNR/DNI.  Confirmed with patient on admission.  Consistent with documented CODE STATUS from most recent annual Medicare physical through [facility] system.    Clinically Significant Risk Factors Present on Admission         # Hyponatremia: Lowest Na = 133 mmol/L in last 2 days, will monitor as appropriate      # Hypoalbuminemia: Lowest albumin = 3.2 g/dL at 12/24/2022 11:21 PM, will monitor as appropriate  # Drug Induced Coagulation Defect: home medication list includes an anticoagulant medication         # Obesity: Estimated body mass index is 31.24 kg/m  as calculated from the following:    Height as of this encounter: 1.549 m (5' 1\").    Weight as of this encounter: 75 kg (165 lb 5.5 oz).           Disposition Plan      Anticipated discharge pending clinical stability and safe disposition plan.  Anticipate discharge to TCU, though active COVID infection may complicate this and prolonged hospitalization.    The patient's care was discussed with the Patient and Dr Peralta in the emergency department.    Ben Guzman MD  Hospitalist Service  [facility]  Securely message with the Vocera Web Console (learn more here)  Text page via [employer] Paging/Directory "         ______________________________________________________________________    Chief Complaint   Cough, generalized weakness    History is obtained from the patient, chart review, review of records from outside system, discussion with ER provider    History of Present Illness   Ham Marie is a 89 year old female who presents to the emergency department with generalized weakness, cough, and positive home COVID test.    Patient lives independently in her home.  Her grandson lives at home with her, though leaves for work during the days.  12/23, patient developed a cough, raspy voice, and some mild shortness of breath.  Tonight, patient was ambulating to the bathroom with the assistance of her daughter when she was too weak to continue.  She essentially was helped to the ground by her daughter in an assisted fall.  She reports pain in her knees from this, though also recognizes that she has longstanding pain in her knees and lower extremities.  Daughter performed a COVID test, and this was positive.  She was brought to the emergency department as she was too weak to manage at home.  Patient reports no nausea or vomiting, no diarrhea, no fevers or chills.  She recalls having breakfast, though did not eat dinner today, so her intake has been less than usual.  Patient recognizes that generally she has had poor intake which she attributes to her advanced age.  Similarly with her knee pain, patient believes that this was just an exacerbation of her chronic knee pain.    Here in the emergency department patient is not hypoxic.  She has an occasional cough, though her primary complaint is weakness.  She reports her cough is worse at night.  She and her family have recognized her status living independently is not likely to be successful in the long-term.  Patient tells me that her daughter has already begun looking for care facilities, though there are limited openings available.  Patient recognizes that she will  likely require TCU/long-term care, though her current COVID-19 diagnosis may complicate timing of this.    Patient has a chronic sacral ulceration.  She tells me that this has been there for years, and because she sits around all day.  She does not believe she has ever had treatment for this; I discussed importance of offloading and mobilization to reduce pressure injury risk and facilitate healing.    Review of Systems    The 10 point Review of Systems is negative other than noted in the HPI or here.  No fevers or chills  Frequent nonproductive cough  Raspy voice is new     Past Medical History    I have reviewed this patient's medical history and updated it with pertinent information if needed.   Past Medical History:   Diagnosis Date     Actinic keratoses      Arthritis      Carpal tunnel syndrome     right     DVT (deep venous thrombosis) (H)     right      Heart murmur      Hypertension      Other and unspecified hyperlipidemia      Pulmonary embolism (H)      Unspecified hypothyroidism        Past Surgical History   I have reviewed this patient's surgical history and updated it with pertinent information if needed.  Past Surgical History:   Procedure Laterality Date     APPENDECTOMY       ARTHROSCOPY KNEE WITH DEBRIDEMENT JOINT, COMBINED  2014    Procedure: ARTHROSCOPY KNEE WITH DEBRIDEMENT JOINT;  Surgeon: Missael Packer MD;  Location: [facility] TOTAL ABDOM HYSTERECTOMY      Hysterectomy, Total Abdominal       Social History   I have reviewed this patient's social history and updated it with pertinent information if needed.  Social History     Tobacco Use     Smoking status: Never     Smokeless tobacco: Never   Substance Use Topics     Alcohol use: Yes     Alcohol/week: 0.0 standard drinks     Comment: rarely     Drug use: No       Family History     Mother and father lived in their 80s.  Had a sister who  in her 50s, though she is uncertain of what.    Prior to Admission  Medications   Prior to Admission Medications   Prescriptions Last Dose Informant Patient Reported? Taking?   Ascorbic Acid (VITAMIN C CR PO)   Yes No   Sig: Take  by mouth.   ELIQUIS ANTICOAGULANT 5 MG tablet   Yes Yes   Sig: Take 5 mg by mouth 2 times daily   Glucosamine-Chondroitin (GLUCOSAMINE CHONDR COMPLEX PO)   Yes No   Sig: Take 2 tablets by mouth daily.   MULTIVITAMIN TABS   OR   Yes No   Si po qd   Multiple Vitamins-Minerals (PRESERVISION AREDS PO)   Yes No   Sig: Take 1 tablet by mouth 2 times daily   Omega-3 Fatty Acids (OMEGA 3 PO)   Yes No   Sig: Take 2 capsules by mouth daily.   acetaminophen (TYLENOL) 500 MG tablet   No No   Sig: Take 2 tablets (1,000 mg) by mouth 3 times daily   calcium carbonate (OSCAL 500) 1250 (500 CA) MG TABS   Yes No   Sig: Take 1 tablet (1,250 mg) by mouth 2 times daily   cholecalciferol (VITAMIN D) 1000 UNIT tablet   No No   Sig: Take 1 tablet (1,000 Units) by mouth daily   levothyroxine (SYNTHROID/LEVOTHROID) 50 MCG tablet   Yes Yes   Sig: Take 50 mcg by mouth daily   magnesium 250 MG tablet   Yes No   Sig: Take 1 tablet by mouth daily.   metoprolol tartrate (LOPRESSOR) 25 MG tablet   Yes Yes   Sig: Take 25 mg by mouth 2 times daily   mirabegron (MYRBETRIQ) 50 MG 24 hr tablet   No No   Sig: Take 1 tablet (50 mg) by mouth daily   order for DME   No No   Sig: Equipment being ordered: walker with 4 wheels and seat.      Facility-Administered Medications: None     Allergies   Allergies   Allergen Reactions     Erythromycin      upsets stomach     Zocor [Simvastatin - High Dose] Rash       Physical Exam   Vital Signs: Temp: 97.7  F (36.5  C) Temp src: Oral BP: (!) 155/87 Pulse: 65   Resp: 20 SpO2: 96 % O2 Device: None (Room air)    Weight: 165 lbs 5.52 oz    General Appearance: Fairly robust for age of 89.  Eyes: Corneas arcus senilis.  No scleral icterus  HEENT: Normocephalic and atraumatic  Respiratory: Breath sounds are clear bilaterally to auscultation.  No wheezes or  crackles.  Cardiovascular: Irregular rate and rhythm.  No appreciable murmur, though patient reports she does have a history of murmur in the past.  Heart sounds are somewhat distant  GI: Abdomen soft, nontender palpation.  No palpable mass.  Lymph/Hematologic: Chronic lymphedema of bilateral lower extremities noted  Genitourinary: Not examined  Skin: Patient with what appears to be an unstageable pressure ulceration of left lateral decubitus.  Has a small area of skin tear, though deep tissue bruising versus eschar present.  No jaundice  Musculoskeletal: Upper extremity and chest wall muscular wasting and subcutaneous fat loss.  More difficult to assess lower extremities with chronic lymphedema.  Neurologic: Patient is hard of hearing.  She is alert, conversant, and generally appropriate in conversation.  Psychiatric: Pleasant, normal affect    Data   Data reviewed today: I reviewed all medications, new labs and imaging results over the last 24 hours. I personally reviewed the chest x-ray image(s) showing Mild cardiomegaly.  No appreciable infiltrate.    Recent Labs   Lab 12/24/22 2323 12/24/22  2321   WBC  --  9.5   HGB  --  12.5   MCV  --  102*   PLT  --  212   INR 1.40*  --    NA  --  133   POTASSIUM  --  4.4   CHLORIDE  --  101   CO2  --  27   BUN  --  24   CR  --  0.80   ANIONGAP  --  5   APURVA  --  9.3   GLC  --  115*   ALBUMIN  --  3.2*   PROTTOTAL  --  7.2   BILITOTAL  --  1.0   ALKPHOS  --  74   ALT  --  15   AST  --  20

## 2022-12-25 NOTE — PLAN OF CARE
"Goal Outcome Evaluation:        Orientation/Cognitive: A&Ox3 disoriented to time  Observation Goals (Met/ Not Met): Not met  Mobility Level/Assist Equipment: Lift, T/R pt not OOB this shift  Fall Risk (Y/N):Y  Behavior Concerns:NA  Pain Management: C/o R knee pain PRN oxy and tylenol , repositioning and heat pads where used  Tele/VS/O2:VSS on RA sating 96%  ABNL Lab/BG: CRP:35.1 TSH:8.31 INR:1.40  Diet: Regular  Bowel/Bladder: Can be incontinent of stool   Skin Concerns:Bilat knee edema and ankle edema, scattered bruising   Drains/Devices:PIV:S/L Purewick  Tests/Procedures for next shift:PT, SW consul.t Bilat knee xray  Anticipated DC date & active delays: 1-2 days  Patient Stated Goal for Today: for the knee not to hurt    Pt is COVID positive and special precautions where maintain        PRIMARY DIAGNOSIS: \"GENERIC\" NURSING  OUTPATIENT/OBSERVATION GOALS TO BE MET BEFORE DISCHARGE:  1. ADLs back to baseline: No    2. Activity and level of assistance: Up with maximum assistance. Consider SW and/or PT evaluation.     3. Pain status: Improved-controlled with oral pain medications.    4. Return to near baseline physical activity: No     Discharge Planner Nurse   Safe discharge environment identified: No  Barriers to discharge: No       Entered by: Jeannie Alva RN 12/25/2022 6:50 AM     Please review provider order for any additional goals.   Nurse to notify provider when observation goals have been met and patient is ready for discharge.           "

## 2022-12-25 NOTE — ED NOTES
Daughter Lakshmi Martinez presents to triage but is aware she can not be in room with pt due to COVID. Ms Martinez given number to ED to contact pt's nurse and would like update on patient once labs are back. She is very concerned as pt lives alone and was unable to ambulate to bathroom tonight with her walker.

## 2022-12-25 NOTE — ED PROVIDER NOTES
"  History   Chief Complaint:  Generalized Weakness (Rapid home COVID test positive)       The history is provided by the patient.      Ham Marie is a 89 year old female on Eliquis with history of hypertension, hyperlipidemia, pulmonary embolism, hypothyroidism, and chronic atrial fibrillation who presents via EMS with generalized weakness. Patient reports a cough, weakness, and shortness of breath a day ago. Daughter tested her for COVID at home and results came back positive. Patient was well today until she had sudden onset weakness where she was not able to get up with her walker. Patient normally ambulates with a walker. Patient was then too weak to do anything, prompting her daughter to call EMS for her.     Review of Systems   Respiratory: Positive for cough and shortness of breath.    Neurological: Positive for weakness.   10 point review of systems performed and is negative except as above and in HPI.    Allergies:  Erythromycin  Zocor [Simvastatin - High Dose]    Medications:  apixaban   Levothyroxine  Metoprolol tartrate  mirabegron     Past Medical History:     Acquired hypothyroidism  Generalized osteoarthrosis  Carpal tunnel syndrome  Degeneration of lumbar or lumbosacral intervertebral disc  Mixed hyperlipidemia  macrocystosis  Hypertension  Deep venous thrombosis  Restless leg syndrome  Edema  Actinic keratosis  Iron deficiency   Pulmonary embolism and infarction  Chronic atrial fibrillation    Past Surgical History:    Appendectomy  Joint debridement  Total abdominal hysterectomy      Social History:  Presents alone.  Lives independently.   Presents via EMS.   PCP: Wilmer Church     Physical Exam     Patient Vitals for the past 24 hrs:   BP Temp Temp src Pulse Resp SpO2 Height Weight   12/25/22 0212 (!) 143/66 98.1  F (36.7  C) Oral 63 18 96 % 1.6 m (5' 3\") 73.6 kg (162 lb 4.8 oz)   12/25/22 0151 -- -- -- -- -- 94 % -- --   12/25/22 0015 -- -- -- -- -- 91 % -- --   12/25/22 0000 (!) 155/83 " "-- -- 67 -- 96 % -- --   12/24/22 2315 (!) 155/87 97.7  F (36.5  C) Oral 65 20 96 % 1.549 m (5' 1\") 75 kg (165 lb 5.5 oz)       Physical Exam  General: Resting on the gurney, appears uncomfortable  Head:  The scalp, face, and head appear normal  Mouth/Throat: Mucus membranes are moist  CV:  Regular rate    Normal S1 and S2  No pathological murmur   Resp:  Systolic murmur best heard in left peristernal border.     Non-labored, no retractions or accessory muscle use    No coarseness    No wheezing     Lungs clear to auscultation in all fields.   GI:  Abdomen is soft, no rigidity    No tenderness to palpation  MS:  Normal motor assessment of all extremities.    Good capillary refill noted.    Skin:   No rash or lesions noted.  Neuro:  Speech is normal and fluent. No apparent deficit.  Psych: Awake. Alert.  Normal affect.      Appropriate interactions.    Emergency Department Course     Imaging:  Chest XR,  PA & LAT   Final Result   IMPRESSION:       Mild bibasilar atelectasis. Linear atelectasis or scarring at the left midlung. No focal airspace disease. No pleural effusion or pneumothorax.      Mild cardiomegaly. Aortic calcifications.      Multilevel degenerative changes of the spine.      XR Knee Port Bilateral 1/2 Views    (Results Pending)     Report per radiology    Laboratory:  Labs Ordered and Resulted from Time of ED Arrival to Time of ED Departure   COMPREHENSIVE METABOLIC PANEL - Abnormal       Result Value    Sodium 133      Potassium 4.4      Chloride 101      Carbon Dioxide (CO2) 27      Anion Gap 5      Urea Nitrogen 24      Creatinine 0.80      Calcium 9.3      Glucose 115 (*)     Alkaline Phosphatase 74      AST 20      ALT 15      Protein Total 7.2      Albumin 3.2 (*)     Bilirubin Total 1.0      GFR Estimate 65     TSH WITH FREE T4 REFLEX - Abnormal    TSH 8.31 (*)    CBC WITH PLATELETS AND DIFFERENTIAL - Abnormal    WBC Count 9.5      RBC Count 3.67 (*)     Hemoglobin 12.5      Hematocrit 37.4      "  (*)     MCH 34.1 (*)     MCHC 33.4      RDW 14.6      Platelet Count 212      % Neutrophils 64      % Lymphocytes 22      % Monocytes 9      % Eosinophils 3      % Basophils 1      % Immature Granulocytes 1      NRBCs per 100 WBC 0      Absolute Neutrophils 6.2      Absolute Lymphocytes 2.1      Absolute Monocytes 0.8      Absolute Eosinophils 0.3      Absolute Basophils 0.1      Absolute Immature Granulocytes 0.1      Absolute NRBCs 0.0     INFLUENZA A/B & SARS-COV2 PCR MULTIPLEX - Abnormal    Influenza A PCR Negative      Influenza B PCR Negative      RSV PCR Negative      SARS CoV2 PCR Positive (*)    INR - Abnormal    INR 1.40 (*)    CRP INFLAMMATION - Abnormal    CRP Inflammation 35.1 (*)    TROPONIN I - Normal    Troponin I High Sensitivity 11     T4 FREE - Normal    Free T4 1.00        Emergency Department Course:     Reviewed:  I reviewed nursing notes, vitals, past medical history and Care Everywhere    Assessments:  2330 I obtained history and examined the patient as noted above.    I rechecked the patient and explained findings.     Consults:  0009 I consulted with Dr. Guzman, Hospitalist, who accepts admission.     Interventions:  Medications   benzonatate (TESSALON) capsule 100 mg (has no administration in time range)   apixaban ANTICOAGULANT (ELIQUIS) tablet 5 mg (has no administration in time range)   levothyroxine (SYNTHROID/LEVOTHROID) tablet 50 mcg (has no administration in time range)   metoprolol tartrate (LOPRESSOR) tablet 25 mg (has no administration in time range)   mirabegron (MYRBETRIQ) 24 hr tablet 50 mg (has no administration in time range)   lidocaine 1 % 0.1-1 mL (has no administration in time range)   lidocaine (LMX4) cream (has no administration in time range)   sodium chloride (PF) 0.9% PF flush 3 mL (3 mLs Intracatheter Given 12/25/22 0231)   sodium chloride (PF) 0.9% PF flush 3 mL (has no administration in time range)   Medication instructions: Do NOT use nebulized medications  (has no administration in time range)   0.9% sodium chloride BOLUS (500 mLs Intravenous New Bag 12/25/22 0230)   remdesivir 200 mg in sodium chloride 0.9 % 250 mL intermittent infusion (has no administration in time range)     Followed by   0.9% sodium chloride BOLUS (has no administration in time range)   remdesivir 100 mg in sodium chloride 0.9 % 250 mL intermittent infusion (has no administration in time range)     And   0.9% sodium chloride BOLUS (has no administration in time range)   acetaminophen (TYLENOL) tablet 650 mg (has no administration in time range)     Or   acetaminophen (TYLENOL) Suppository 650 mg (has no administration in time range)   oxyCODONE IR (ROXICODONE) half-tab 2.5 mg (has no administration in time range)   senna-docusate (SENOKOT-S/PERICOLACE) 8.6-50 MG per tablet 1 tablet (has no administration in time range)     Or   senna-docusate (SENOKOT-S/PERICOLACE) 8.6-50 MG per tablet 2 tablet (has no administration in time range)   polyethylene glycol (MIRALAX) Packet 17 g (has no administration in time range)   bisacodyl (DULCOLAX) suppository 10 mg (has no administration in time range)   ondansetron (ZOFRAN ODT) ODT tab 4 mg (has no administration in time range)     Or   ondansetron (ZOFRAN) injection 4 mg (has no administration in time range)   prochlorperazine (COMPAZINE) injection 5 mg (has no administration in time range)     Or   prochlorperazine (COMPAZINE) tablet 5 mg (has no administration in time range)     Or   prochlorperazine (COMPAZINE) suppository 12.5 mg (has no administration in time range)   naloxone (NARCAN) injection 0.2 mg (has no administration in time range)     Or   naloxone (NARCAN) injection 0.4 mg (has no administration in time range)     Or   naloxone (NARCAN) injection 0.2 mg (has no administration in time range)     Or   naloxone (NARCAN) injection 0.4 mg (has no administration in time range)       Disposition:  The patient was admitted to the hospital under the  care of Dr. Guzman.     Impression & Plan   Medical Decision Making:  Ham Marie is a 89 year old female who presents for evaluation of generalized weakness symptoms. COVID positive. Overall She looks well and I see no evidence of bacterial pneumonia, myocarditis or acute CHF.  Doubt pulmonary embolism or ACS. Doubt serious bacterial infection.    Given patient's age and weakness and independent living condition, I will admit at this time as she is unable to safely perform ADLs, does nto have adequate assistance at home, and is on blood thinners, making her a significant fall risk.    Diagnosis:    ICD-10-CM    1. Infection due to 2019 novel coronavirus  U07.1           Scribe Disclosure:  I, Airam Honeycutt, am serving as a scribe at 11:29 PM on 12/24/2022 to document services personally performed by Adri Peralta MD based on my observations and the provider's statements to me.      Adri Peralta MD  12/25/22 0337

## 2022-12-26 PROBLEM — U07.1 INFECTION DUE TO 2019 NOVEL CORONAVIRUS: Status: ACTIVE | Noted: 2022-01-01

## 2022-12-26 NOTE — CONSULTS
"[facility]    Orthopedic Consultation    Ham Marie MRN# [id number]   Age: 89 year old YOB: 1921     Date of Admission: 12/24/2022    Reason for consult: Bilateral knee pain       Requesting physician: Laurie Archibald MD       Level of consult: Consult, follow and place orders           Assessment and Plan:   Assessment:   Severe bilateral knee degenerative joint disease      Plan:   The patient's history and clinical/diagnostic findings were reviewed with the on-call orthopedic trauma surgeon, Dr. Rolo Jones. The patient is COVID positive. Radiographs are consistent with severe bilateral knee DJD and chrondrocalcinosis and negative for fracture. WBC 9.5 (WNL), CRP 35.1 (slightly elevated, but also has COVID). Little to no suspicion of septic joint as patient has been ambulatory, labs are reassuring, and visual inspection is rather unremarkable aside from just mild swelling. The patient declined exam and discussion of treatment options. As she is on Eliquis, would recommend against oral NSAIDs. Will defer cortisone injection(s) as she states her knees are fine, but this could be considered on an outpatient basis.    -Voltaren cream ordered.  -WBAT with walker.  -Mobilize with PT/OT.  -Tylenol for pain.     Please contact orthopedic trauma team if any questions or concerns arise.           Chief Complaint:   \"My knees are just fine.\"         History of Present Illness:   Medical history obtained through chart review. Elly Marie is a 89 year old female with past medical history of DVT and PE on Eliquis, chronic atrial fibrillation, generalized weakness and physical deconditioning, and recently COVID positive who orthopedics was consulted on for bilateral knee pain. X-rays were significant for severe bilateral knee DJD. Upon meeting with the patient, she states that her knees are \"just fine\" and to \"leave her alone.\" When questioned if she recalls any trauma, she " denies this. The patient denies any bilateral knee pain at this time. She is not interested in an evaluation or discussion of her knees as she is not having issues with them.          Past Medical History:     Past Medical History:   Diagnosis Date     Actinic keratoses      Arthritis      Carpal tunnel syndrome     right     DVT (deep venous thrombosis) (H) 2000    right      Heart murmur      Hypertension      Other and unspecified hyperlipidemia      Pulmonary embolism (H) 2000     Unspecified hypothyroidism              Past Surgical History:     Past Surgical History:   Procedure Laterality Date     APPENDECTOMY       ARTHROSCOPY KNEE WITH DEBRIDEMENT JOINT, COMBINED  5/13/2014    Procedure: ARTHROSCOPY KNEE WITH DEBRIDEMENT JOINT;  Surgeon: Missael Packer MD;  Location: [facility] TOTAL ABDOM HYSTERECTOMY      Hysterectomy, Total Abdominal             Social History:     Social History     Tobacco Use     Smoking status: Never     Smokeless tobacco: Never   Substance Use Topics     Alcohol use: Yes     Alcohol/week: 0.0 standard drinks     Comment: rarely             Family History:     Family History   Problem Relation Age of Onset     C.A.D. No family hx of      Breast Cancer No family hx of      Cancer - colorectal No family hx of              Immunizations:     VACCINE/DOSE   Diptheria   DPT   DTAP   HBIG   Hepatitis A   Hepatitis B   HIB   Influenza   Measles   Meningococcal   MMR   Mumps   Pneumococcal   Polio   Rubella   Small Pox   TDAP   Varicella   Zoster             Allergies:     Allergies   Allergen Reactions     Erythromycin      upsets stomach     Zocor [Simvastatin - High Dose] Rash             Medications:     Current Facility-Administered Medications   Medication     remdesivir 100 mg in sodium chloride 0.9 % 250 mL intermittent infusion    And     0.9% sodium chloride BOLUS     acetaminophen (TYLENOL) tablet 650 mg    Or     acetaminophen (TYLENOL) Suppository 650 mg     apixaban  ANTICOAGULANT (ELIQUIS) tablet 5 mg     benzonatate (TESSALON) capsule 100 mg     bisacodyl (DULCOLAX) suppository 10 mg     levothyroxine (SYNTHROID/LEVOTHROID) tablet 50 mcg     lidocaine (LMX4) cream     lidocaine 1 % 0.1-1 mL     Medication instructions: Do NOT use nebulized medications     metoprolol tartrate (LOPRESSOR) tablet 25 mg     mirabegron (MYRBETRIQ) 24 hr tablet 50 mg     naloxone (NARCAN) injection 0.2 mg    Or     naloxone (NARCAN) injection 0.4 mg    Or     naloxone (NARCAN) injection 0.2 mg    Or     naloxone (NARCAN) injection 0.4 mg     ondansetron (ZOFRAN ODT) ODT tab 4 mg    Or     ondansetron (ZOFRAN) injection 4 mg     oxyCODONE IR (ROXICODONE) half-tab 2.5 mg     polyethylene glycol (MIRALAX) Packet 17 g     prochlorperazine (COMPAZINE) injection 5 mg    Or     prochlorperazine (COMPAZINE) tablet 5 mg    Or     prochlorperazine (COMPAZINE) suppository 12.5 mg     QUEtiapine (SEROquel) half-tab 12.5 mg     senna-docusate (SENOKOT-S/PERICOLACE) 8.6-50 MG per tablet 1 tablet    Or     senna-docusate (SENOKOT-S/PERICOLACE) 8.6-50 MG per tablet 2 tablet     sodium chloride (PF) 0.9% PF flush 3 mL     sodium chloride (PF) 0.9% PF flush 3 mL             Review of Systems:   CV: NEGATIVE for chest pain, palpitations or peripheral edema  C: NEGATIVE for fever, chills, change in weight  E/M: NEGATIVE for ear, mouth and throat problems  R: NEGATIVE for significant cough or SOB          Physical Exam:   All vitals have been reviewed  Patient Vitals for the past 24 hrs:   BP Temp Temp src Pulse Resp SpO2   12/26/22 0745 125/68 98.8  F (37.1  C) Oral 90 16 97 %   12/26/22 0200 (!) 147/69 98  F (36.7  C) Oral 67 18 98 %   12/25/22 1910 100/56 98.9  F (37.2  C) Oral 66 18 98 %   12/25/22 1555 -- -- -- 60 -- 94 %   12/25/22 1521 -- 97.7  F (36.5  C) Oral 64 20 95 %   12/25/22 1300 121/70 98.7  F (37.1  C) Oral 61 20 93 %   12/25/22 0859 -- -- -- -- 18 --   12/25/22 0814 -- -- -- -- 18 --        Intake/Output Summary (Last 24 hours) at 12/26/2022 0757  Last data filed at 12/26/2022 0650  Gross per 24 hour   Intake 240 ml   Output 900 ml   Net -660 ml       Constitutional: Alert, declines any evaluation at this time and asked me to leave the room.  HEENT: Head atraumatic normocephalic. Pupils equal round and reactive.  Respiratory: Unlabored breathing no audible wheeze  Cardiovascular: Periodically irregular rate and rhythm per pulses.  GI: Abdomen is non-distended.  Lymph/Hematologic: No lymphadenopathy in areas examined.  Skin: No rashes, no cyanosis of visually examined areas.  Musculoskeletal: Bilateral knees: Skin intact. Mild diffuse swelling, bilaterally. Patient declined any further exam including palpation, motion, ligamentous testing, and motor testing.  Neurologic: Alert.          Data:   All laboratory data reviewed  Results for orders placed or performed during the hospital encounter of 12/24/22   Chest XR,  PA & LAT     Status: None    Narrative    EXAM: XR CHEST 2 VIEWS  LOCATION: [facility]  DATE/TIME: 12/25/2022 12:35 AM    INDICATION: Cough.  COMPARISON: 03/14/2011      Impression    IMPRESSION:     Mild bibasilar atelectasis. Linear atelectasis or scarring at the left midlung. No focal airspace disease. No pleural effusion or pneumothorax.    Mild cardiomegaly. Aortic calcifications.    Multilevel degenerative changes of the spine.   XR Knee Port Bilateral 1/2 Views     Status: None    Narrative    EXAM: XR KNEE PORT BILATERAL 1/2 VIEWS  LOCATION: [facility]  DATE/TIME: 12/25/2022 10:33 AM    INDICATION: assisted fall, knee pain  COMPARISON: None.      Impression    IMPRESSION:   RIGHT KNEE: No acute fracture or malalignment. Severe medial compartment degenerative changes with bone-on-bone articulation. Mild patellofemoral compartment marginal bony spurring. Small knee joint effusion. Mild chondrocalcinosis in the lateral    compartment. Osteopenia. Atherosclerosis.    LEFT KNEE: No acute fracture or malalignment. Moderate to severe medial compartment degenerative changes. No knee joint effusion. Osteopenia. Atherosclerosis.   [place] Draw     Status: None    Narrative    The following orders were created for panel order [place] Draw.  Procedure                               Abnormality         Status                     ---------                               -----------         ------                     Extra Red Top Tube[[id number]]                               Final result               Extra Green Top (Lithium...[[id number]]                      Final result               Extra Purple Top Tube[[id number]]                                                       Extra Blood Bank Purple ...[[id number]]                      Final result                 Please view results for these tests on the individual orders.   Extra Red Top Tube     Status: None   Result Value Ref Range    Hold Specimen JIC    Extra Green Top (Lithium Heparin) Tube     Status: None   Result Value Ref Range    Hold Specimen JIC    Extra Blood Bank Purple Top Tube     Status: None   Result Value Ref Range    Hold Specimen jic    [place] Draw     Status: None    Narrative    The following orders were created for panel order [place] Draw.  Procedure                               Abnormality         Status                     ---------                               -----------         ------                     Extra Blue Top Tube[[id number]]                              Final result                 Please view results for these tests on the individual orders.   Extra Blue Top Tube     Status: None   Result Value Ref Range    Hold Specimen JI    Comprehensive metabolic panel     Status: Abnormal   Result Value Ref Range    Sodium 133 133 - 144 mmol/L    Potassium 4.4 3.4 - 5.3 mmol/L    Chloride 101 94 - 109 mmol/L    Carbon Dioxide (CO2) 27 20 - 32 mmol/L    Anion Gap 5  3 - 14 mmol/L    Urea Nitrogen 24 7 - 30 mg/dL    Creatinine 0.80 0.52 - 1.04 mg/dL    Calcium 9.3 8.5 - 10.1 mg/dL    Glucose 115 (H) 70 - 99 mg/dL    Alkaline Phosphatase 74 40 - 150 U/L    AST 20 0 - 45 U/L    ALT 15 0 - 50 U/L    Protein Total 7.2 6.8 - 8.8 g/dL    Albumin 3.2 (L) 3.4 - 5.0 g/dL    Bilirubin Total 1.0 0.2 - 1.3 mg/dL    GFR Estimate 65 >60 mL/min/1.73m2   Troponin I     Status: Normal   Result Value Ref Range    Troponin I High Sensitivity 11 <54 ng/L   TSH with free T4 reflex     Status: Abnormal   Result Value Ref Range    TSH 8.31 (H) 0.40 - 4.00 mU/L   CBC with platelets and differential     Status: Abnormal   Result Value Ref Range    WBC Count 9.5 4.0 - 11.0 10e3/uL    RBC Count 3.67 (L) 3.80 - 5.20 10e6/uL    Hemoglobin 12.5 11.7 - 15.7 g/dL    Hematocrit 37.4 35.0 - 47.0 %     (H) 78 - 100 fL    MCH 34.1 (H) 26.5 - 33.0 pg    MCHC 33.4 31.5 - 36.5 g/dL    RDW 14.6 10.0 - 15.0 %    Platelet Count 212 150 - 450 10e3/uL    % Neutrophils 64 %    % Lymphocytes 22 %    % Monocytes 9 %    % Eosinophils 3 %    % Basophils 1 %    % Immature Granulocytes 1 %    NRBCs per 100 WBC 0 <1 /100    Absolute Neutrophils 6.2 1.6 - 8.3 10e3/uL    Absolute Lymphocytes 2.1 0.8 - 5.3 10e3/uL    Absolute Monocytes 0.8 0.0 - 1.3 10e3/uL    Absolute Eosinophils 0.3 0.0 - 0.7 10e3/uL    Absolute Basophils 0.1 0.0 - 0.2 10e3/uL    Absolute Immature Granulocytes 0.1 <=0.4 10e3/uL    Absolute NRBCs 0.0 10e3/uL   Symptomatic Influenza A/B & SARS-CoV2 (COVID-19) Virus PCR Multiplex Nasopharyngeal     Status: Abnormal    Specimen: Nasopharyngeal; Swab   Result Value Ref Range    Influenza A PCR Negative Negative    Influenza B PCR Negative Negative    RSV PCR Negative Negative    SARS CoV2 PCR Positive (A) Negative    Narrative    Testing was performed using the Xpert Xpress CoV2/Flu/RSV Assay on the [employer] Instrument. This test should be ordered for the detection of SARS-CoV-2 and influenza viruses in  individuals who meet clinical and/or epidemiological criteria. Test performance is unknown in asymptomatic patients. This test is for in vitro diagnostic use under the FDA EUA for laboratories certified under CLIA to perform high or moderate complexity testing. This test has not been FDA cleared or approved. A negative result does not rule out the presence of PCR inhibitors in the specimen or target RNA in concentration below the limit of detection for the assay. If only one viral target is positive but coinfection with multiple targets is suspected, the sample should be re-tested with another FDA cleared, approved, or authorized test, if coinfection would change clinical management. This test was validated by the [facility]. These laboratories are certified under the Clinical Laboratory Improvement Amendments of 1988 (CLIA-88) as qualified to perform high complexity laboratory testing.   INR     Status: Abnormal   Result Value Ref Range    INR 1.40 (H) 0.85 - 1.15   T4 free     Status: Normal   Result Value Ref Range    Free T4 1.00 0.76 - 1.46 ng/dL   CRP inflammation     Status: Abnormal   Result Value Ref Range    CRP Inflammation 35.1 (H) 0.0 - 8.0 mg/L   CBC with platelets differential     Status: Abnormal    Narrative    The following orders were created for panel order CBC with platelets differential.  Procedure                               Abnormality         Status                     ---------                               -----------         ------                     CBC with platelets and d...[[id number]]  Abnormal            Final result                 Please view results for these tests on the individual orders.          Attestation:  I have reviewed today's vital signs, notes, medications, labs and imaging with Dr. Rolo Jones.  Amount of time performed on this consult: 15 minutes.    Yanira Jerry PA-C  [facility]

## 2022-12-26 NOTE — PLAN OF CARE
"Goal Outcome Evaluation:  Goal Outcome Evaluation:  Orientation/Cognitive: Alert to self, and date-  intermittently confused. Forgetful.  [place] and poor vision  Observation Goals (Met/ Not Met): Not met  Mobility Level/Assist Equipment: Ax1GB/W, T&R- pt refuses repos at times, and will often shift herself after positioning into more neutral on back position  Fall Risk (Y/N):Yes  Behavior Concerns: irritable, paranoid, anxious a times. Refusing cares at times.  Pain Management:PRN Tylenol given but pt does not endorse pain  Tele/VS/O2:  VSS on RA, refuses cont pulse ox  ABNL Lab/BG: Covid +, special precaution maintained  Diet: Regular, poor declined lunch  Bowel/Bladder: purewick in place when in bed. Very incontinent- unaware  Skin Concerns: Bilat knee edema, scattered bruising; blanchable redness/bruising to sacrum, mepilex in place.   Drains/Devices:PIV S/L, Pure wick, good UOP  Tests/Procedures for next shift: PT/SW/WOC consults pending  Anticipated DC date & active delays: TBD  Patient Stated Goal for Today: [place]    PRIMARY DIAGNOSIS: \"GENERIC\" NURSING  OUTPATIENT/OBSERVATION GOALS TO BE MET BEFORE DISCHARGE:  1. ADLs back to baseline: Not met     2. Activity and level of assistance: AX1 GB/W pivot to transfer     3. Pain status: Improved pt states no pain on numerous assessments     4. Return to near baseline physical activity: No                      "

## 2022-12-26 NOTE — PROGRESS NOTES
"PRIMARY DIAGNOSIS: \"GENERIC\" NURSING  OUTPATIENT/OBSERVATION GOALS TO BE MET BEFORE DISCHARGE:  1. ADLs back to baseline: Not met    2. Activity and level of assistance: AX1-2GB/W pivot to transfer    3. Pain status: Improved-controlled with oral pain medications.    4. Return to near baseline physical activity: No     Discharge Planner Nurse   Safe discharge environment identified: No  Barriers to discharge: Yes       Entered by: Matthias Ibarra RN 12/26/2022 3:18 AM       "

## 2022-12-26 NOTE — PROGRESS NOTES
"PRIMARY DIAGNOSIS: \"GENERIC\" NURSING  OUTPATIENT/OBSERVATION GOALS TO BE MET BEFORE DISCHARGE:  1. ADLs back to baseline: Not met    2. Activity and level of assistance: AX1-2GB/W pivot to transfer    3. Pain status: Improved-controlled with oral pain medications.    4. Return to near baseline physical activity: No     Discharge Planner Nurse   Safe discharge environment identified: No  Barriers to discharge: Yes       Entered by: Matthias Ibarra RN 12/26/2022 6:41 AM     Orientation/Cognitive: Alert to self,delirious and forgetful at times; [facility]  Observation Goals (Met/ Not Met): Not met  Mobility Level/Assist Equipment: Ax1GB/W, T&R  Fall Risk (Y/N):Yes  Behavior Concerns: Delirious overnight and refusing cares and treatment  Pain Management:PRN Tylenol and oxy available,declines meds, heat pack in use  Tele/VS/O2: Elevated B.P at times,other VSS on 2l via NC,  ABNL Lab/BG: Covid +, special precaution maintained  Diet: Regular  Bowel/Bladder: purewick in place  Skin Concerns: Bilat knee edema, scattered bruising; blanchable redness/bruising to sacrum, mepilex in place.   Drains/Devices:PIV S/L, Pure wick, good UOP  Tests/Procedures for next shift: Ortho surg/PT/SW/WOC consults pending  Anticipated DC date & active delays: TBD  Patient Stated Goal for Today:    "

## 2022-12-26 NOTE — PROGRESS NOTES
"[facility]    Medicine Progress Note - Hospitalist Service    Date of Admission:  12/24/2022    Assessment & Plan      Ham Marie is a 89 year old female who presented to 12/24/2022 with generalized weakness due to acute COVID-19 infection     # Confirmed COVID-19 infection    Describes raspy throat and cough 12/23.  Positive home COVID test     Symptom Onset 12/23/2022   Date of 1st Positive Test 12/24/2022   Vaccination Status Fully Vaccinated         - COVID-19 special precautions, continuous pulse-ox  - Oxygen: Patient is not currently requiring supplemental oxygen.  If needed, titrate to keep SpO2 between 90-96%  - Labs: BMP ok, CRP 35, LFTs ok, CBC ok,  INR 1.4  - Imaging: CXR 12/25 -mild bibasilar atelectasis.  No focal infiltrate.    - Breathing treatments: no inhalers needed; avoid nebulizers in favor of MDIs   - IV fluids: 500 mL bolus on admission.  No other fluids needed.  - Antibiotics: not indicated   - COVID-Focused Medications: Remdesivir x 3 days or until hospital discharge, started on 12/25/22  - DVT Prophylaxis:         - At high risk of thrombotic complications due to COVID-19 (DDimer = N/A )         - Already on therapeutic anticoagulation with Eliquis     Generalized weakness and physical deconditioning:   Currently lives at home independently.  Grandson lives at her home as well, though leaves for work during the day.  Patient recognizes that her living situation had already become tenuous, daughter was in the process of looking for care facilities prior to this hospitalization.  -Care management consultation for disposition planning  -Physical therapy consulted-have recommended TCU/LTC     Unstageable sacral pressure ulceration: Present on admission.  Patient reports that she has had a pressure ulceration of for \"years.\"  -Offloading, frequent mobilization.    -Physical therapy consulted  -Wound nurse consult     History of DVT, pulmonary " "embolism:  -Continue Eliquis     Chronic atrial fibrillation:  -Continue Eliquis, metoprolol 25 mg twice daily      Urinary frequency  -Continue Myrbetriq    Right knee pain and swelling-likely due to osteoarthritis  - Right knee is mildly swollen, warm and slightly tender to touch.  No trauma or falls. No known history of gout  - X-ray showed no acute fractures or malalignment.  Showed moderate to severe medial compartment degenerative changes  -Orthopedics consulted.  Have recommended Tylenol, Voltaren, weightbearing as tolerated, PT/OT  -Cortisone injection deferred as patient reported her knee is feeling fine but could be considered as outpatient    Hyponatremia: Lowest Na = 130 mmol/L   - monitor as appropriate        Hypoalbuminemia: Lowest albumin = 3.2 g/dL   - monitor as appropriate    Drug Induced Coagulation Defect: Due to Eliquis     Overweight: Estimated body mass index is 28.75 kg/m  as calculated from the following:    Height as of this encounter: 1.6 m (5' 3\").    Weight as of this encounter: 73.6 kg (162 lb 4.8 oz).             Diet: Combination Diet Regular Diet Adult    DVT Prophylaxis: DOAC  Barney Catheter: Not present  Central Lines: None  Cardiac Monitoring: None  Code Status: No CPR- Do NOT Intubate      Disposition Plan      Expected Discharge Date: 01/05/2023        Discharge Comments: Covid + --placement.        The patient's care was discussed with the Patient.    Laurie Archibald MD  Hospitalist Service  [facility]  Securely message with the Vocera Web Console (learn more here)  Text page via [email or web address] Paging/Directory         Clinically Significant Risk Factors Present on Admission         # Hyponatremia: Lowest Na = 130 mmol/L in last 2 days, will monitor as appropriate      # Hypoalbuminemia: Lowest albumin = 3.2 g/dL at 12/24/2022 11:21 PM, will monitor as appropriate  # Drug Induced Coagulation Defect: home medication list includes an anticoagulant medication      " "   # Overweight: Estimated body mass index is 28.75 kg/m  as calculated from the following:    Height as of this encounter: 1.6 m (5' 3\").    Weight as of this encounter: 73.6 kg (162 lb 4.8 oz).           ______________________________________________________________________    Interval History   Patient is hard of hearing.  She is sitting in chair.  Reports she wants to go back to bed.  Denies any knee pain.  No chest pain or shortness of breath.  Appears better than before       Data reviewed today: I reviewed all medications, new labs and imaging results over the last 24 hours. I personally reviewed no images or EKG's today.    Physical Exam   Vital Signs: Temp: 97.7  F (36.5  C) Temp src: Oral BP: 107/57 Pulse: 52   Resp: 16 SpO2: 93 % O2 Device: None (Room air) Oxygen Delivery: 2 LPM  Weight: 162 lbs 4.8 oz    Constitutional- patient is awake and alert, resting in bed, in no acute distress  Cardiovascular- Irregular rate and rhythm, no murmurs, no edema  Pulmonary-lungs are clear to auscultation bilaterally, no wheezing or rhonchi  GI-abdomen is soft, nontender, nondistended, no hepatosplenomegaly or masses  Integumentary-skin is warm and dry, no rashes or ulcers  Neurological-patient is awake, alert and oriented x3.  Moving all 4 extremities, normal speech, no focal deficits  Musculoskeletal - right knee is mildly warm and tender on medial side    Data   Recent Labs   Lab 12/26/22  1141 12/24/22  2323 12/24/22  2321   WBC  --   --  9.5   HGB  --   --  12.5   MCV  --   --  102*   PLT  --   --  212   INR  --  1.40*  --    *  --  133   POTASSIUM 3.8  --  4.4   CHLORIDE 98  --  101   CO2  --   --  27   BUN  --   --  24   CR  --   --  0.80   ANIONGAP  --   --  5   APURVA  --   --  9.3   GLC  --   --  115*   ALBUMIN  --   --  3.2*   PROTTOTAL  --   --  7.2   BILITOTAL  --   --  1.0   ALKPHOS  --   --  74   ALT  --   --  15   AST  --   --  20     No results found for this or any previous visit (from the past 24 " hour(s)).  Medications     - MEDICATION INSTRUCTIONS -         remdesivir  100 mg Intravenous Q24H    And     sodium chloride 0.9%  50 mL Intravenous Q24H     apixaban ANTICOAGULANT  5 mg Oral BID     diclofenac  4 g Topical 4x Daily     levothyroxine  50 mcg Oral Daily     metoprolol tartrate  25 mg Oral BID     mirabegron  50 mg Oral Daily     sodium chloride (PF)  3 mL Intracatheter Q8H

## 2022-12-26 NOTE — PROGRESS NOTES
"   12/26/22 1002   Appointment Info   Signing Clinician's Name / Credentials (PT) Peter Noblescarlos Montilla DPT   Living Environment   People in Home grandchild(ronald)   Current Living Arrangements house   Home Accessibility other (see comments)   Living Environment Comments Pt lives in house with grandson, pt reports that there are stairs but unable to provide estimate of how many. Pt states she only leaves house for doctor appointments and daughter has been searching for different living facility.   Self-Care   Usual Activity Tolerance moderate   Current Activity Tolerance fair   Regular Exercise No   Equipment Currently Used at Home walker, rolling;shower chair;grab bar, toilet;grab bar, tub/shower;raised toilet seat   Fall history within last six months yes   Number of times patient has fallen within last six months 1   Activity/Exercise/Self-Care Comment Pt is IND with functional mobility at baseline. Uses 4WW for ambulation.   General Information   Onset of Illness/Injury or Date of Surgery 12/24/22   Referring Physician Laurie Archibald MD   Patient/Family Therapy Goals Statement (PT) did not state   Pertinent History of Current Problem (include personal factors and/or comorbidities that impact the POC) Pt is 89 yo female who, per chart, \" presented to 12/24/2022 with generalized weakness due to acute COVID-19 infection\" and \"Radiographs are consistent with severe bilateral knee DJD and chrondrocalcinosis and negative for fracture. \"   Existing Precautions/Restrictions fall   Cognition   Affect/Mental Status (Cognition) WFL;other (see comments)   Cognitive Status Comments mild irriatation/frustration; mild confusion noted, pt stating, \"I'm in my room\" later with probing questions, able to stay \"yeah, I know I'm in [place]\"   Pain Assessment   Patient Currently in Pain No   Posture    Posture Forward head position;Protracted shoulders;Kyphosis   Range of Motion (ROM)   Range of Motion ROM is WFL   Strength (Manual " Muscle Testing)   Strength (Manual Muscle Testing) Deficits observed during functional mobility   Bed Mobility   Comment, (Bed Mobility) not assessed on this date.   Transfers   Transfers sit-stand transfer   Comment, (Transfers) STS with FWW and CGA.   Gait/Stairs (Locomotion)   Comment, (Gait/Stairs) Pt ambulated 10' with FWW and CGA, kyphotic posture, decreased gait speed, occasional Nelda for redirecting walker.   Balance   Balance Comments standing and ambulation with FWW, no overt LOB throughout. however, pt appears to having mild buckling of LEs and fatigues quickly.   Sensory Examination   Sensory Perception patient reports no sensory changes   Clinical Impression   Criteria for Skilled Therapeutic Intervention Yes, treatment indicated   PT Diagnosis (PT) impaired functional mobility   Influenced by the following impairments pain, decreased strength, decreased activity tolerance.   Functional limitations due to impairments difficulty with transfers and ambulation.   Clinical Presentation (PT Evaluation Complexity) Stable/Uncomplicated   Clinical Presentation Rationale clinical judgement   Clinical Decision Making (Complexity) low complexity   Planned Therapy Interventions (PT) balance training;bed mobility training;gait training;ROM (range of motion);stair training;strengthening;stretching;transfer training;progressive activity/exercise   Risk & Benefits of therapy have been explained evaluation/treatment results reviewed;care plan/treatment goals reviewed;risks/benefits reviewed;current/potential barriers reviewed;participants voiced agreement with care plan;participants included;patient   PT Total Evaluation Time   PT Eval, Low Complexity Minutes (25962) 10   Therapy Certification   Start of care date 12/24/22   Certification date from 12/24/22   Certification date to 01/02/23   Medical Diagnosis generalized weakness and COVID-19   Physical Therapy Goals   PT Frequency 4x/week   PT Predicted Duration/Target  Date for Goal Attainment 01/02/23   PT Goals Bed Mobility;Transfers;Gait;Stairs   PT: Bed Mobility Supervision/stand-by assist;Supine to/from sit   PT: Transfers Supervision/stand-by assist;Sit to/from stand;Assistive device   PT: Gait Supervision/stand-by assist;Rolling walker;100 feet   PT: Stairs Minimal assist;Greater than 10 stairs;Rail on right  (unknown number of stairs, will need to discuss with family.)   PT Discharge Planning   PT Plan assess bed mobility, progress amb distance and activity tolerance, trial stairs.   PT Discharge Recommendation (DC Rec) Transitional Care Facility;Long term care facility   PT Rationale for DC Rec Pt at baseline lives in house with grandson (who works full-time). Pt reports being IND with functional mobility. Currently, pt below baseline, requires CG-Nelda and FWW with transfers and ambulation, decreased activity tolerance noted. Pt will benefit from TCF to progress strength, activity tolerance and IND. Note: family has already been searching for shelter (prior to pt's admission) due to increased difficulty with mobility in home. Communicate with SW/CC as needed.   PT Brief overview of current status STS with FWW and CGA, amb w/ FWW and SBA.   Total Session Time   Total Session Time (sum of timed and untimed services) 10     [facility]  OUTPATIENT PHYSICAL THERAPY EVALUATION  PLAN OF TREATMENT FOR OUTPATIENT REHABILITATION  (COMPLETE FOR INITIAL CLAIMS ONLY)  Patient's Last Name, First Name, M.I.  YOB: 1921  Ham Marie                        Provider's Name  [facility] Medical Record No.  [id number]                             Onset Date:  12/24/22   Start of Care Date:  12/24/22   Type:     _X_PT   ___OT   ___SLP Medical Diagnosis:  generalized weakness and COVID-19              PT Diagnosis:  impaired functional mobility Visits from SOC:  1     See note for plan of treatment, functional goals  and certification details    I CERTIFY THE NEED FOR THESE SERVICES FURNISHED UNDER        THIS PLAN OF TREATMENT AND WHILE UNDER MY CARE     (Physician co-signature of this document indicates review and certification of the therapy plan).

## 2022-12-26 NOTE — CONSULTS
"[facility] Nurse Inpatient Assessment     Consulted for: sacral     Patient History (according to provider note(s):      Ham Marie is a 89 year old female who presented to 12/24/2022 with generalized weakness due to acute COVID-19 infection    Areas Assessed:      Areas visualized during today's visit: Sacrum/coccyx    Skin Injury Location: sacrum/buttock    Last photo: 12/26/22        Skin injury due to: Chronic skin injury (often related to prolonged recliner use)  Skin history and plan of care:   Pt reports she sleeps in a recliner chair. Tissue intact and all blanchable. WO assisted pt in positioning and pt immediately shifted out to site in semi fowlers and directly on her buttocks. Nursing reports she is refusing position changes   Affected area:      Skin assessment: Intact and purple blanchable erythema over BL buttock and coccyx     Measurements (length x width x depth, in cm) Approx 10 x 15cm area with focused areas over fleshy buttock and coccyx     Color: purple     Temperature  normal      Drainage: none .      Color: none      Odor: none  Pain: denies , none  Pain interventions prior to dressing change: N/A  Treatment goal: Protection  STATUS: initial assessment  Supplies ordered: supplies stored on unit and discussed with RN         Treatment Plan:     Coccyx/Buttock wound(s): Every 3 days   1. Clean wound with saline or MicroKlenz Spray, pat dry  2. Wipe / \"clean\" the surrounding periwound tissue with skin prep (Cavilon No Sting Skin Prep #[id number]) and allow to dry. This will help protect periwound and help dressing adherence  3. Press a Mepilex  Sacral Dressing (PS#[id number])  to the area, making sure to conform nicely to skin curvatures.   4. Time and date dressing change  NOTE  -Reposition pt side to side ino when in bed, every 2 hours-get the pt way over on side to completely offload pressure. This will benefit skin and respiratory function   -Keep heels " elevated and floating on pillows at all times. Try using at least 2 pillows under each calf.  -When up to the chair pt needs to fully offload every 2 hours and use a chair cushion if needed          Orders: Written    RECOMMEND PRIMARY TEAM ORDER: None, at this time  Education provided: Off-loading pressure  Discussed plan of care with: Patient and Nurse  WOC nurse follow-up plan: signing off  Notify WOC if wound(s) deteriorate.  Nursing to notify the Provider(s) and re-consult the WOC Nurse if new skin concern.    DATA:     Current support surface: Standard  Standard gel/foam mattress (IsoFlex, Atmos air, etc)  Containment of urine/stool: Incontinence Protocol and Brief  BMI: Body mass index is 28.75 kg/m .   Active diet order: Orders Placed This Encounter      Combination Diet Regular Diet Adult     Output: I/O last 3 completed shifts:  In: 240 [P.O.:240]  Out: 1200 [Urine:1200]     Labs: Recent Labs   Lab 12/26/22  1457 12/26/22  1141 12/24/22  2323 12/24/22  2321   ALBUMIN  --   --   --  3.2*   HGB 12.1  --   --  12.5   INR  --   --  1.40*  --    WBC 7.6  --   --  9.5   CRP  --  94.3*  --  35.1*     Pressure injury risk assessment:   Sensory Perception: 3-->slightly limited  Moisture: 3-->occasionally moist  Activity: 3-->walks occasionally  Mobility: 3-->slightly limited  Nutrition: 3-->adequate  Friction and Shear: 3-->no apparent problem  Jun Score: 18    Ovidio Thedoore RN CWOCN  Dept. Pager: [phone]  Dept. Office Number: [phone]

## 2022-12-26 NOTE — PLAN OF CARE
Goal Outcome Evaluation:     Orientation/Cognitive: A&O forgetful; [place]  Observation Goals (Met/ Not Met): Not met  Mobility Level/Assist Equipment: A1GB/W, T&R  Fall Risk (Y/N):Yes  Behavior Concerns: none  Pain Management: C/o R knee pain, declined meds, heat pack applied  Tele/VS/O2: VSS, pualette, 2L nc  ABNL Lab/BG: Covid +  Diet: Regular  Bowel/Bladder: purewick in place  Skin Concerns: Bilat knee edema, scattered bruising; blanchable redness/bruising to sacrum, mepilex in place.   Drains/Devices:PIV S/L, Purewick  Tests/Procedures for next shift: Ortho/PT/SW/WOCN consults  Anticipated DC date & active delays: 1-2 days  Patient Stated Goal for Today: Rest & no pain in knee

## 2022-12-26 NOTE — PROGRESS NOTES
PRIMARY DIAGNOSIS: GENERALIZED WEAKNESS     OUTPATIENT/OBSERVATION GOALS TO BE MET BEFORE DISCHARGE  1. Orthostatic performed: N/A     2. Tolerating PO medications: Met     3. Return to near baseline physical activity: Not Met     4. Cleared for discharge by consultants (if involved): No     Discharge Planner Nurse   Safe discharge environment identified: No  Barriers to discharge: Yes  Please review provider order for any additional goals.   Nurse to notify provider when observation goals have been met and patient is ready for discharge.

## 2022-12-26 NOTE — UTILIZATION REVIEW
Admission Status; Secondary Review Determination       Under the authority of the Utilization Management Committee, the utilization review process indicated a secondary review on the above patient. The review outcome is based on review of the medical records, discussions with staff, and applying clinical experience noted on the date of the review.     (x) Inpatient Status Appropriate - This patient's medical care is consistent with medical management for inpatient care and reasonable inpatient medical practice.     RATIONALE FOR DETERMINATION     Patient requires inpatient admission versus short stay observation or outpatient treatment for the following reasons:    89 y/o woman with chronic medical problems: Atrial fibrillation, history of DVT/PE, urinary frequency, advanced osteoarthritis of the right knee presented on December 24 with a generalized weakness and due to acute COVID-19 infection.  Patient requires remdesivir due to comorbidities.  Patient continues to be confused, forgetful, refuses cares at times.  Patient has pressure sore on the sacrum.    Patient needs close monitoring of the mental status, close monitoring of the respiratory status while she has been treated for COVID-19 infection    The expected length of stay at the time of admission was more than 2 nights because of the severity of illness, intensity of service provided, and risk for adverse outcome. Inpatient admission is appropriate.     Dr Archibald notified     This document was produced using voice recognition software       The information on this document is developed by the utilization review team in order for the business office to ensure compliance. This only denotes the appropriateness of proper admission status and does not reflect the quality of care rendered.   The definitions of Inpatient Status and Observation Status used in making the determination above are those provided in the CMS Coverage Manual, Chapter 1 and  Chapter 6, section 70.4.   Sincerely,   DEANGELO CAMPO MD   Utilization Review  Physician Advisor  [facility]

## 2022-12-26 NOTE — PROVIDER NOTIFICATION
MD Notification    Notified Person: MD    Notified Person Name:Dex MIMS    Notification Date/Time:12/26/22@0343    Notification Interaction:Paged via [employer] web    Purpose of Notification: Pt getting really confused and agitated, refusing cares and medication, refused for new i.v to be started for her remdesivir, any new orders?    Orders Received:OK to hold off on remdesivir for now, will order some Seroquel for now and if continue to be un cooperative and delirious, let her be.    Comments:

## 2022-12-27 NOTE — PLAN OF CARE
A/OX3, forgetful.[place]. VSS, room air. Up with 1 assist/walker. Fair appetite. Purewick in place, incontinence of bladder at times. Mepilex on coccyx in place. Turn and repositioned. Up on chair today.

## 2022-12-27 NOTE — PLAN OF CARE
Pt slept well between cares. 1L NC overnight with O2 in mid 90s.Remains vitally stable, afib with bradycardia. Denies SOB at rest, dyspnea noted with activity per report. LS clear. Alert x 3-4. Brief and purewick for incontinence. BLE tenderness upon assessment. Volteran applied to knees. +2 edema BLE. Moves as 1A/walker/BG.  WOC consulted for blanchable area to buttocks.  Expected discharge date 1/5/23 to TCU per MD note.

## 2022-12-27 NOTE — PROGRESS NOTES
Orientation/Cognitive: A&O to self  Observation Goals (Met/ Not Met): Inpatient  Mobility Level/Assist Equipment: A-1 GB/Walker  Fall Risk (Y/N): y  Behavior Concerns: None  Pain Management: Diclofenec + PRN tylenol  Tele/VS/O2: VSS on RA  ABNL Lab/BG: Na-130  Diet: Reg  Bowel/Bladder: Incont. B&B  Skin Concerns: Pressure injury to buttocks  Drains/Devices: External catheter (Purewick)  Tests/Procedures for next shift: None  Anticipated DC date & active delays: Pending  Patient Stated Goal for Today: None

## 2022-12-27 NOTE — PROGRESS NOTES
"[facility]    Medicine Progress Note - Hospitalist Service    Date of Admission:  12/24/2022    Assessment & Plan      Ham Marie is a 89 year old female who presented to 12/24/2022 with generalized weakness due to acute COVID-19 infection     # Confirmed COVID-19 infection    Describes raspy throat and cough 12/23.  Positive home COVID test     Symptom Onset 12/23/2022   Date of 1st Positive Test 12/24/2022   Vaccination Status Fully Vaccinated         - COVID-19 special precautions, continuous pulse-ox  - Oxygen: Patient is not currently requiring supplemental oxygen.  If needed, titrate to keep SpO2 between 90-96%  - Labs: BMP ok, CRP 35 --> 109, LFTs ok, CBC ok,  INR 1.4  - Imaging: CXR 12/25 -mild bibasilar atelectasis.  No focal infiltrate.    - Breathing treatments: no inhalers needed; avoid nebulizers in favor of MDIs   - IV fluids: 500 mL bolus on admission.  No other fluids needed.  - Antibiotics: not indicated   - COVID-Focused Medications: Remdesivir x 3 days  - DVT Prophylaxis:         - At high risk of thrombotic complications due to COVID-19 (DDimer = N/A )         - Already on therapeutic anticoagulation with Eliquis     Generalized weakness and physical deconditioning:   Currently lives at home independently.  Grandson lives at her home as well, though leaves for work during the day.  Patient recognizes that her living situation had already become tenuous, daughter was in the process of looking for care facilities prior to this hospitalization.  -Care management consultation for disposition planning  -Physical therapy consulted-have recommended TCU/LTC     Unstageable sacral pressure injury: Present on admission.    Patient reports that she has had a pressure ulceration of for \"years.\"  -Offloading, frequent mobilization.    -Physical therapy consulted  -Wound nurse consulted.  Continue wound care per [facility]     History of DVT, pulmonary embolism:  -Continue " "Eliquis     Chronic atrial fibrillation:  -Continue Eliquis, metoprolol 25 mg twice daily      Urinary frequency  -Continue Myrbetriq    Right knee pain and swelling-likely due to osteoarthritis  - Right knee is mildly swollen, warm and slightly tender to touch.  No trauma or falls. No known history of gout  - X-ray showed no acute fractures or malalignment.  Showed moderate to severe medial compartment degenerative changes  -Orthopedics consulted.  Have recommended Tylenol, Voltaren, weightbearing as tolerated, PT/OT  -Cortisone injection deferred as patient reported her knee is feeling fine but could be considered as outpatient  -CRP is elevated at 109.  Unsure if this is due to anxiety or due to COVID    Hyponatremia: Lowest Na = 130 mmol/L   - monitor as appropriate.  Now 132        Hypoalbuminemia: Lowest albumin = 3.2 g/dL   - monitor as appropriate    Drug Induced Coagulation Defect: Due to Eliquis     Overweight: Estimated body mass index is 28.75 kg/m  as calculated from the following:    Height as of this encounter: 1.6 m (5' 3\").    Weight as of this encounter: 73.6 kg (162 lb 4.8 oz).             Diet: Combination Diet Regular Diet Adult    DVT Prophylaxis: DOAC  Barney Catheter: Not present  Central Lines: None  Cardiac Monitoring: None  Code Status: No CPR- Do NOT Intubate      Disposition Plan      Expected Discharge Date: 01/04/2023        Discharge Comments: Covid + --placement.        The patient's care was discussed with the Patient.    Laurie Archibald MD  Hospitalist Service  [facility]  Securely message with the Vocera Web Console (learn more here)  Text page via [employer] Paging/Directory         Clinically Significant Risk Factors Present on Admission         # Hyponatremia: Lowest Na = 130 mmol/L in last 2 days, will monitor as appropriate      # Hypoalbuminemia: Lowest albumin = 3.2 g/dL at 12/24/2022 11:21 PM, will monitor as appropriate  # Drug Induced Coagulation " "Defect: home medication list includes an anticoagulant medication         # Overweight: Estimated body mass index is 28.75 kg/m  as calculated from the following:    Height as of this encounter: 1.6 m (5' 3\").    Weight as of this encounter: 73.6 kg (162 lb 4.8 oz).           ______________________________________________________________________    Interval History   Patient is sleeping in bed.  She is very hard of hearing.  She offers no complaints.  Saturations are over 90%.        Data reviewed today: I reviewed all medications, new labs and imaging results over the last 24 hours. I personally reviewed no images or EKG's today.    Physical Exam   Vital Signs: Temp: 97.3  F (36.3  C) Temp src: Oral BP: (!) 142/72 Pulse: 60   Resp: 16 SpO2: 97 % O2 Device: None (Room air) Oxygen Delivery: 1 LPM  Weight: 162 lbs 4.8 oz    Constitutional- patient is awake and alert, resting in bed, in no acute distress  Cardiovascular- Irregular rate and rhythm, no murmurs, no edema  Pulmonary-lungs are clear to auscultation bilaterally, no wheezing or rhonchi  GI-abdomen is soft, nontender, nondistended, no hepatosplenomegaly or masses  Integumentary-skin is warm and dry, no rashes or ulcers  Neurological- alert and oriented x3.  Moving all 4 extremities, normal speech, no focal deficits  Musculoskeletal - right knee is not warm today on exam     Data   Recent Labs   Lab 12/27/22  0626 12/26/22  1457 12/26/22  1141 12/24/22  2323 12/24/22  2321   WBC 5.3 7.6  --   --  9.5   HGB 11.3* 12.1  --   --  12.5    101*  --   --  102*    202  --   --  212   INR  --   --   --  1.40*  --    *  --  130*  --  133   POTASSIUM 3.9  --  3.8  --  4.4   CHLORIDE 101  --  98  --  101   CO2 25  --  23  --  27   BUN 26  --  16  --  24   CR 0.70  --  0.66  --  0.80   ANIONGAP 6  --  9  --  5   APURVA 8.1*  --  8.3*  --  9.3   GLC 94  --  139*  --  115*   ALBUMIN  --   --   --   --  3.2*   PROTTOTAL  --   --   --   --  7.2   BILITOTAL  -- "   --   --   --  1.0   ALKPHOS  --   --   --   --  74   ALT  --   --   --   --  15   AST  --   --   --   --  20     No results found for this or any previous visit (from the past 24 hour(s)).  Medications     - MEDICATION INSTRUCTIONS -         apixaban ANTICOAGULANT  5 mg Oral BID     diclofenac  4 g Topical 4x Daily     levothyroxine  50 mcg Oral Daily     metoprolol tartrate  25 mg Oral BID     mirabegron  50 mg Oral Daily     multivitamin  with lutein  1 capsule Oral BID     sodium chloride (PF)  3 mL Intracatheter Q8H

## 2022-12-28 NOTE — PLAN OF CARE
Goal Outcome Evaluation:  Pt A&OX3, forgetful limitations. VSS, continue 02 sat monitoring, up with 1 gait belt and walker,  Scattered bruises, Knees pain manage with topical diclofenac gel. Turn/repo, pure wick in place. COVID/ISO precautions maintained. Continue to monitor

## 2022-12-28 NOTE — PLAN OF CARE
Orientation/Cognitive: A/Ox3, forgetful, [place]   Observation Goals (Met/ Not Met): N/a; in-patient status   Mobility Level/Assist Equipment: Ax1 w/ Gb and walker, WBAT  Fall Risk (Y/N): yes   Behavior Concerns: frustrated at times   Pain Management: Knee pain, mild, Diclofenac gel applied   Tele/VS/O2: VSS on RA, SPO2 >92%   ABNL Lab/BG: COVID + 12/24/22 , hypoNa+ 132, increased from 130   Diet: regular, poor appetite, ensure offered and consumed, prefers vanilla   Bowel/Bladder: incontinent, purewick in place   Skin Concerns: erythema/swollen and tender knees, coccyx w/ unstageable pressure ulcer (pre-existing x years) -mepilex in place, wound care completed today   Drains/Devices: continuous pulse oximetry, PIV L arm-NS flushed/locked   Tests/Procedures for next shift: COVID daily labs-CRP, BMP   Anticipated DC date & active delays: TBD pending covid recovery, stable VS, pt might transition to TCU/LTC per PT [place]. DTR actively looking for placement since patient's PTA  Patient Stated Goal for Today: sleep

## 2022-12-28 NOTE — PLAN OF CARE
Goal Outcome Evaluation:    Goal Outcome Evaluation:  Orientation/Cognitive: A&O, forgetful; [place] and poor vision, provided pocket talker  Observation Goals (Met/ Not Met): INP  Mobility Level/Assist Equipment: Ax1GB/W, T&R  Fall Risk (Y/N):Yes  Behavior Concerns: anxious at times.   Pain Management:PRN oxy given, see MAR. Started on scheduled tylenol for R leg pain.   Tele/VS/O2:  VSS on RA, except paulette   ABNL Lab/BG: Covid +, special precaution maintained  Diet: Regular, poor appetite   Bowel/Bladder: purewick in place when in bed. Very incontinent- unaware. Had BM x2.   Skin Concerns: Bilat knee edema, scattered bruising; blanchable redness/bruising to sacrum, mepilex in place. Skin tags scattered.  Drains/Devices:PIV S/L, Pure wick, bladder scanned and ambulated to toilet, still 202mL in bladder at 1500.  Tests/Procedures for next shift: PT/SW/WOC consults pending  Anticipated DC date & active delays: TBD, awaiting covid recovery   Patient Stated Goal for Today: [place]

## 2022-12-28 NOTE — PROGRESS NOTES
"[facility]    Medicine Progress Note - Hospitalist Service    Date of Admission:  12/24/2022    Assessment & Plan      Ham Marie is a 89 year old female who presented to 12/24/2022 with generalized weakness due to acute COVID-19 infection     # Confirmed COVID-19 infection    Describes raspy throat and cough 12/23.  Positive home COVID test     Symptom Onset 12/23/2022   Date of 1st Positive Test 12/24/2022   Vaccination Status Fully Vaccinated         - COVID-19 special precautions, continuous pulse-ox  - Oxygen: Patient is not currently requiring supplemental oxygen.  If needed, titrate to keep SpO2 between 90-96%  - Labs: BMP ok, CRP 35 --> 109 --> 74, LFTs ok, CBC ok,  INR 1.4  - Imaging: CXR 12/25 -mild bibasilar atelectasis.  No focal infiltrate.    - Breathing treatments: no inhalers needed; avoid nebulizers in favor of MDIs   - IV fluids: 500 mL bolus on admission.  No other fluids needed.  - Antibiotics: not indicated   - COVID-Focused Medications: Remdesivir x 3 days, completed  - DVT Prophylaxis:         - At high risk of thrombotic complications due to COVID-19 (DDimer = N/A )         - Already on therapeutic anticoagulation with Eliquis  -CRP is improving     Generalized weakness and physical deconditioning:   Currently lives at home independently.  Grandson lives at her home as well, though leaves for work during the day.  Patient recognizes that her living situation had already become tenuous, daughter was in the process of looking for care facilities prior to this hospitalization.  -Care management consultation for disposition planning  -Physical therapy consulted-have recommended TCU/LTC     Unstageable sacral pressure injury: Present on admission.    Patient reports that she has had a pressure ulceration of for \"years.\"  -Offloading, frequent mobilization.    -Physical therapy consulted  -Wound nurse consulted.  Continue wound care per [facility]     History of DVT, " Nephrology Consult Note    REASON FOR CONSULT: Renal cyst    ASSESSMENT/PLAN:        1) Renal cyst- initially noted incidentally in 2015 while evaluating longstanding right-sided abdominal wall lipoma and was reportedly 3-1/2 to 4 cm currently approximatel "pulmonary embolism:  -Continue Eliquis     Chronic atrial fibrillation:  -Continue Eliquis  -Due to bradycardia with heart rate in 50s, will decrease metoprolol to 12.5 mg twice daily       Urinary frequency  -Continue Myrbetriq    Right knee pain and swelling-likely due to osteoarthritis  Bilateral lower extremity pain  - Right knee is mildly swollen, warm and slightly tender to touch.  No trauma or falls. No known history of gout  - X-ray showed no acute fractures or malalignment.  Showed moderate to severe medial compartment degenerative changes  -Orthopedics consulted.  Have recommended Tylenol, Voltaren, weightbearing as tolerated, PT/OT  -Cortisone injection deferred as patient reported her knee is feeling fine but could be considered as outpatient  -CRP is improving.  The knee is not warm anymore.    -She has chronic bilateral lower extremity pain  -Continue Tylenol, as needed oxycodone for pain control      Hyponatremia: Lowest Na = 130 mmol/L   - monitor as appropriate.  Now 133    Hypoalbuminemia: Lowest albumin = 3.2 g/dL   - monitor as appropriate    Drug Induced Coagulation Defect: Due to Eliquis     Overweight: Estimated body mass index is 28.75 kg/m  as calculated from the following:    Height as of this encounter: 1.6 m (5' 3\").    Weight as of this encounter: 73.6 kg (162 lb 4.8 oz).             Diet: Combination Diet Regular Diet Adult    DVT Prophylaxis: DOAC  Barney Catheter: Not present  Central Lines: None  Cardiac Monitoring: None  Code Status: No CPR- Do NOT Intubate      Disposition Plan      Expected Discharge Date: 01/04/2023        Discharge Comments: Covid + --placement.        The patient's care was discussed with the Patient.    Laurie Archibald MD  Hospitalist Service  [facility]  Securely message with the Vocera Web Console (learn more here)  Text page via [employer] Paging/Directory         Clinically Significant Risk Factors         # Hyponatremia: Lowest Na = 132 " skin rashes/myalgias/arthralgias    PMH:  No past medical history on file. PSH:  No past surgical history on file.     Medications (Active prior to today's visit):  Current Outpatient Medications   Medication Sig Dispense Refill   • Ergocalciferol (VITAM "mmol/L in last 2 days, will monitor as appropriate      # Hypoalbuminemia: Lowest albumin = 3.2 g/dL at 12/24/2022 11:21 PM, will monitor as appropriate            # Overweight: Estimated body mass index is 28.75 kg/m  as calculated from the following:    Height as of this encounter: 1.6 m (5' 3\").    Weight as of this encounter: 73.6 kg (162 lb 4.8 oz)., PRESENT ON ADMISSION         ______________________________________________________________________    Interval History   Patient is sitting in chair.  Reports pain in her lower extremities.  Reports she just took a pain medicine.  She denies any chest pain or shortness of breath.        Data reviewed today: I reviewed all medications, new labs and imaging results over the last 24 hours. I personally reviewed no images or EKG's today.    Physical Exam   Vital Signs: Temp: 98.2  F (36.8  C) Temp src: Oral BP: (!) 101/36 Pulse: 57   Resp: 18 SpO2: 94 % O2 Device: None (Room air)    Weight: 162 lbs 4.8 oz    Constitutional- patient is awake and alert, resting in bed, in no acute distress  Cardiovascular- Irregular rate and rhythm, no murmurs, no edema  Pulmonary-lungs are clear to auscultation bilaterally, no wheezing or rhonchi  GI-abdomen is soft, nontender, nondistended, no hepatosplenomegaly or masses  Integumentary-skin is warm and dry, no rashes or ulcers  Neurological- alert and oriented x3.  Moving all 4 extremities, normal speech, no focal deficits  Musculoskeletal - right knee is not warm today on exam     Data   Recent Labs   Lab 12/28/22  0637 12/27/22  0626 12/26/22  1457 12/26/22  1141 12/24/22  2323 12/24/22  2321   WBC  --  5.3 7.6  --   --  9.5   HGB  --  11.3* 12.1  --   --  12.5   MCV  --  100 101*  --   --  102*   PLT  --  172 202  --   --  212   INR  --   --   --   --  1.40*  --     132*  --  130*  --  133   POTASSIUM 4.0 3.9  --  3.8  --  4.4   CHLORIDE 103 101  --  98  --  101   CO2 24 25  --  23  --  27   BUN 25 26  --  16  --  24   CR " organomegaly  Extremities: Without clubbing, cyanosis or edema.   Neurologic:  normal affect, cranial nerves grossly intact, moving all extremities  Skin: Warm and dry, no rashes        Leandro Rodríguez MD  10/21/2020  11:37 AM 0.65 0.70  --  0.66  --  0.80   ANIONGAP 6 6  --  9  --  5   APURVA 8.3* 8.1*  --  8.3*  --  9.3   * 94  --  139*  --  115*   ALBUMIN  --   --   --   --   --  3.2*   PROTTOTAL  --   --   --   --   --  7.2   BILITOTAL  --   --   --   --   --  1.0   ALKPHOS  --   --   --   --   --  74   ALT  --   --   --   --   --  15   AST  --   --   --   --   --  20     No results found for this or any previous visit (from the past 24 hour(s)).  Medications     - MEDICATION INSTRUCTIONS -         apixaban ANTICOAGULANT  5 mg Oral BID     diclofenac  4 g Topical 4x Daily     levothyroxine  50 mcg Oral Daily     metoprolol tartrate  12.5 mg Oral BID     mirabegron  50 mg Oral Daily     multivitamin  with lutein  1 capsule Oral BID     sodium chloride (PF)  3 mL Intracatheter Q8H

## 2022-12-28 NOTE — PROGRESS NOTES
"Pt slept well overnight. Remains on COVID 19 precaution. A/Ox3, VSS on RA, O2 sats 92-96%. Turn and repos q2hrs. Pure wick at noc, good U/O. Denies SOB, cough, headache and N/V. Transfers with A1/GB/W. Regular diet. Reports generalized pain of 8/10, prn oxycodone and tylenol given with some effect. No acute events reported overnight.      /71 (BP Location: Right arm, Patient Position: Left side, Cuff Size: Adult Regular)   Pulse 62   Temp 98  F (36.7  C) (Oral)   Resp 18   Ht 1.6 m (5' 3\")   Wt 73.6 kg (162 lb 4.8 oz)   SpO2 92%   BMI 28.75 kg/m      "

## 2022-12-29 NOTE — PLAN OF CARE
Orientation/Cognitive: A/Ox4, forgetful, [place], pocket talker at bedside   Observation Goals (Met/ Not Met): N/a; in-patient status   Mobility Level/Assist Equipment: Ax1 w/ Gb and walker, WBAT  Fall Risk (Y/N): yes   Behavior Concerns: frustrated at times   Pain Management: Knee pain, mild, Scheduled Tylenol given   Tele/VS/O2: VSS on RA, SPO2 >92%, bradycardic, asymptomatic   ABNL Lab/BG: COVID + 12/24/22 , hypoNa+ 133, increased from 132   Diet: regular-tolerating, appetite improving per pt  Bowel/Bladder: incontinent, purewick in place , adequate urine output   Skin Concerns: erythema/swollen and tender knees, coccyx w/ unstageable pressure ulcer (pre-existing x years) -mepilex in place, wound care completed   Drains/Devices: continuous pulse oximetry, PIV L arm-NS flushed/locked   Tests/Procedures for next shift: COVID daily labs-CRP, BMP   Anticipated DC date & active delays: TBD pending covid recovery, stable VS, pt might transition to TCU/LTC per PT [place]. DTR actively looking for placement since patient's PTA, family updated on patient's status and tentative plans   Patient Stated Goal for Today: sleep

## 2022-12-29 NOTE — PROGRESS NOTES
"SPIRITUAL HEALTH SERVICES  SPIRITUAL ASSESSMENT Progress Note  [facility]. [address] [facility] short stay     REFERRAL SOURCE: Pt request for spiritual care on admission.    Brief visit with \"Pat\" who did not recall asking for spiritual care. Pt states she has \"no particular macrina background' and declined support at this time.    Plan: Spiritual Health remains available at patient's request for the duration of admission.    Audrey Westbrook MDiv  Staff   [facility] routine referrals *[phone]  [facility] available 24/7 for emergent requests/referrals, either by having the on-call  paged or by entering an ASAP/STAT consult in Epic (this will also page the on-call ).    "

## 2022-12-29 NOTE — PROGRESS NOTES
"[facility]    Medicine Progress Note - Hospitalist Service    Date of Admission:  12/24/2022    Assessment & Plan      Ham Marie is a 89 year old female who presented to 12/24/2022 with generalized weakness due to acute COVID-19 infection     # Confirmed COVID-19 infection    Describes raspy throat and cough 12/23.  Positive home COVID test     Symptom Onset 12/23/2022   Date of 1st Positive Test 12/24/2022   Vaccination Status Fully Vaccinated         - COVID-19 special precautions, continuous pulse-ox  - Oxygen: Patient is not currently requiring supplemental oxygen.  If needed, titrate to keep SpO2 between 90-96%  - Labs: BMP ok, CRP 35 --> 109 --> 74, LFTs ok, CBC ok,  INR 1.4  - Imaging: CXR 12/25 -mild bibasilar atelectasis.  No focal infiltrate.    - Breathing treatments: no inhalers needed; avoid nebulizers in favor of MDIs   - IV fluids: 500 mL bolus on admission.  No other fluids needed.  - Antibiotics: not indicated   - COVID-Focused Medications: Remdesivir x 3 days, completed  - DVT Prophylaxis:         - At high risk of thrombotic complications due to COVID-19 (DDimer = N/A )         - Already on therapeutic anticoagulation with Eliquis  -CRP is improving     Generalized weakness and physical deconditioning:   Currently lives at home independently.  Grandson lives at her home as well, though leaves for work during the day.  Patient recognizes that her living situation had already become tenuous, daughter was in the process of looking for care facilities prior to this hospitalization.  -Care management consultation for disposition planning  -Physical therapy consulted-have recommended TCU/LTC     Unstageable sacral pressure injury: Present on admission.    Patient reports that she has had a pressure ulceration of for \"years.\"  -Offloading, frequent mobilization.    -Physical therapy consulted  -Wound nurse consulted.  Continue wound care per [facility]     History of DVT, " "pulmonary embolism:  -Continue Eliquis     Chronic atrial fibrillation:  -Continue Eliquis  -Due to bradycardia with heart rate in 50s, metoprolol decreased to 12.5 mg twice daily       Urinary frequency  -Continue Myrbetriq    Right knee pain and swelling-likely due to osteoarthritis  Bilateral lower extremity pain  - Right knee is mildly swollen, warm and slightly tender to touch.  No trauma or falls. No known history of gout  - X-ray showed no acute fractures or malalignment.  Showed moderate to severe medial compartment degenerative changes  -Orthopedics consulted.  Have recommended Tylenol, Voltaren, weightbearing as tolerated, PT/OT  -Cortisone injection deferred as patient reported her knee is feeling fine but could be considered as outpatient  -CRP is improving.  The knee is not warm anymore.    -She has chronic bilateral lower extremity pain  -Continue Tylenol tid scheduled, as needed oxycodone for pain control      Hyponatremia: Lowest Na = 130 mmol/L   - monitor as appropriate.  Now 133    Hypoalbuminemia: Lowest albumin = 3.2 g/dL   - monitor as appropriate    Drug Induced Coagulation Defect: Due to Eliquis     Overweight: Estimated body mass index is 28.75 kg/m  as calculated from the following:    Height as of this encounter: 1.6 m (5' 3\").    Weight as of this encounter: 73.6 kg (162 lb 4.8 oz).             Diet: Combination Diet Regular Diet Adult    DVT Prophylaxis: DOAC  Barney Catheter: Not present  Central Lines: None  Cardiac Monitoring: None  Code Status: No CPR- Do NOT Intubate      Disposition Plan      Expected Discharge Date: 01/04/2023        Discharge Comments: Covid + --placement.        The patient's care was discussed with the Patient.    Laurie Archibald MD  Hospitalist Service  [facility]  Securely message with the Vocera Web Console (learn more here)  Text page via [employer] Paging/Directory         Clinically Significant Risk Factors         # Hyponatremia: " "Lowest Na = 133 mmol/L in last 2 days, will monitor as appropriate      # Hypoalbuminemia: Lowest albumin = 3.2 g/dL at 12/24/2022 11:21 PM, will monitor as appropriate            # Overweight: Estimated body mass index is 28.75 kg/m  as calculated from the following:    Height as of this encounter: 1.6 m (5' 3\").    Weight as of this encounter: 73.6 kg (162 lb 4.8 oz)., PRESENT ON ADMISSION         ______________________________________________________________________    Interval History   Patient is resting in bed. Reports she feels better today  Has bilateral leg pain but reports it only bothers her when her legs are touched   She denies any chest pain or shortness of breath.        Data reviewed today: I reviewed all medications, new labs and imaging results over the last 24 hours. I personally reviewed no images or EKG's today.    Physical Exam   Vital Signs: Temp: 98  F (36.7  C) Temp src: Oral BP: 126/57 Pulse: 61   Resp: 16 SpO2: 98 % O2 Device: None (Room air)    Weight: 162 lbs 4.8 oz    Constitutional- patient is awake and alert, resting in bed, in no acute distress  Cardiovascular- Irregular rate and rhythm, no murmurs, no edema  Pulmonary-lungs are clear to auscultation bilaterally, no wheezing or rhonchi  GI-abdomen is soft, nontender, nondistended, no hepatosplenomegaly or masses  Integumentary-skin is warm and dry, no rashes or ulcers  Neurological- alert and oriented x3.  Moving all 4 extremities, normal speech, no focal deficits  Musculoskeletal - right knee is not warm today on exam     Data   Recent Labs   Lab 12/29/22  0635 12/28/22  0637 12/27/22  0626 12/26/22  1457 12/26/22  1141 12/24/22  2323 12/24/22  2321   WBC  --   --  5.3 7.6  --   --  9.5   HGB  --   --  11.3* 12.1  --   --  12.5   MCV  --   --  100 101*  --   --  102*   PLT  --   --  172 202  --   --  212   INR  --   --   --   --   --  1.40*  --     133 132*  --    < >  --  133   POTASSIUM 4.3 4.0 3.9  --    < >  --  4.4 "   CHLORIDE 102 103 101  --    < >  --  101   CO2 22 24 25  --    < >  --  27   BUN 23 25 26  --    < >  --  24   CR 0.72 0.65 0.70  --    < >  --  0.80   ANIONGAP 9 6 6  --    < >  --  5   APURVA 8.3* 8.3* 8.1*  --    < >  --  9.3   GLC 94 103* 94  --    < >  --  115*   ALBUMIN  --   --   --   --   --   --  3.2*   PROTTOTAL  --   --   --   --   --   --  7.2   BILITOTAL  --   --   --   --   --   --  1.0   ALKPHOS  --   --   --   --   --   --  74   ALT  --   --   --   --   --   --  15   AST  --   --   --   --   --   --  20    < > = values in this interval not displayed.     No results found for this or any previous visit (from the past 24 hour(s)).  Medications     - MEDICATION INSTRUCTIONS -         acetaminophen  975 mg Oral Q8H     apixaban ANTICOAGULANT  5 mg Oral BID     diclofenac  4 g Topical 4x Daily     levothyroxine  50 mcg Oral Daily     metoprolol tartrate  12.5 mg Oral BID     mirabegron  50 mg Oral Daily     multivitamin  with lutein  1 capsule Oral BID     sodium chloride (PF)  3 mL Intracatheter Q8H

## 2022-12-29 NOTE — PROGRESS NOTES
Goal Outcome Evaluation:  Orientation/Cognitive: A&O, forgetful; [place] and poor vision  Observation Goals (Met/ Not Met): INP  Mobility Level/Assist Equipment: Ax1GB/W, T&R  Fall Risk (Y/N):Yes  Behavior Concerns: anxious at times.   Pain Management: scheduled tylenol, has not been complaining of pain this shift.   Tele/VS/O2:  VSS on RA  ABNL Lab/BG: Covid +, special precaution maintained  Diet: Regular, appetite improving   Bowel/Bladder: purewick in place when in bed. Incontinent, unaware of going. Needs encouragement to ambulate to toilet to urinate today, low U/O in cannister.   Skin Concerns: Bilat knee edema, scattered bruising; bruising to buttocks. Mepilex in place and changed this shift 12/29 (change every 3 days), no redness noted on sacrum. Skin tags scattered.  Drains/Devices: PIV S/L, Pure wick  Tests/Procedures for next shift: PT/SW/WOC consults pending  Anticipated DC date & active delays: TBD, awaiting covid recovery, placement needed   Patient Stated Goal for Today: pain control

## 2022-12-29 NOTE — PLAN OF CARE
Goal Outcome Evaluation:  A&Ox4, forgetful. VSS on RA. Incontinent of bladder. Purewick in place. All needs met at this time. Spoke to granddaughter this evening about pt's status.

## 2022-12-29 NOTE — CONSULTS
Care Management Initial Consult    General Information  Assessment completed with: Lakshmi Romo  Type of CM/SW Visit: Initial Assessment    Primary Care Provider verified and updated as needed: Yes   Readmission within the last 30 days: no previous admission in last 30 days      Reason for Consult: discharge planning  Advance Care Planning: Advance Care Planning Reviewed: other (see comments) (No acp docs)          Communication Assessment  Patient's communication style: spoken language (English or Bilingual)    Hearing Difficulty or Deaf: yes   Wear Glasses or Blind: yes    Cognitive  Cognitive/Neuro/Behavioral: WDL  Level of Consciousness: alert  Arousal Level: opens eyes spontaneously  Orientation: oriented x 4  Mood/Behavior: calm, cooperative  Best Language: 0 - No aphasia  Speech: logical, clear    Living Environment:   People in home: other (see comments) (grandson)     Current living Arrangements: house      Able to return to prior arrangements: yes       Family/Social Support:  Care provided by: self, other (see comments) (grandson)  Provides care for: no one  Marital Status:   Children          Description of Support System: Involved, Supportive    Support Assessment: Adequate social supports, Adequate family and caregiver support    Current Resources:   Patient receiving home care services: No     Community Resources: None  Equipment currently used at home: walker, rolling  Supplies currently used at home: None    Employment/Financial:  Employment Status: retired        Financial Concerns:             Lifestyle & Psychosocial Needs:  Social Determinants of Health     Tobacco Use: Low Risk      Smoking Tobacco Use: Never     Smokeless Tobacco Use: Never     Passive Exposure: Not on file   Alcohol Use: Not on file   Financial Resource Strain: Not on file   Food Insecurity: Not on file   Transportation Needs: Not on file   Physical Activity: Not on file   Stress: Not on file   Social Connections:  Not on file   Intimate Partner Violence: Not on file   Depression: Not on file   Housing Stability: Not on file       Functional Status:  Prior to admission patient needed assistance:              Mental Health Status:          Chemical Dependency Status:                Values/Beliefs:  Spiritual, Cultural Beliefs, Restorationism Practices, Values that affect care: no               Additional Information:  Per care management/social work consult for discharge planning, patient was admitted on 12/26/2022 with generalized weakness. Tentative date of discharge is 1/4/2023. Writer talked to Lakshmi (dtr) on the phone and she said that patient lives with her grandson in her house. Patient's grandson helps her with meals. Patient gets assistance with taking baths as it's getting more difficult for her. Lakshmi stated that she's thinking patient will go to an assisted living soon as it's getting more difficult for her at home. Patient uses a walker as needed. Mentioned tcu recommendation once patient is recovered and she would like referrals sent to [facility] (1) and [place] (2) once patient is covid recovered.    Writer will send out referrals to above tcus once patient is closer to discharge.    Will continue to follow.    BROCK Pickett

## 2022-12-29 NOTE — PLAN OF CARE
Goal Outcome Evaluation:     Orientation/Cognitive: A&O, forgetful; [place] and poor vision, provided pocket talker and gave glasses but helped with reading menu to pt  Observation Goals (Met/ Not Met): INP  Mobility Level/Assist Equipment: Ax1GB/W, T&R  Fall Risk (Y/N):Yes  Behavior Concerns: None this shift  Pain Management: offered tylenol but pt declined; wants Oxy before bedtime  Tele/VS/O2:  VSS on RA, ex bradycardic at times  ABNL Lab/BG: Covid +  Diet: Regular- poor appetite   Bowel/Bladder: purewick in place when in bed. Very incontinent- unaware. No BM this afternoon.   Skin Concerns: Bilat knee edema, scattered bruising; blanchable redness/bruising to sacrum, mepilex in place.   Drains/Devices:PIV S/L, Purewick in place, Brief changed  Tests/Procedures for next shift: PT/SW/WOC consults  Anticipated DC date & active delays: TBD  Patient Stated Goal for Today: None given

## 2022-12-30 NOTE — PLAN OF CARE
Goal Outcome Evaluation:  Orientation/Cognitive: A&Ox4  Observation Goals (Met/ Not Met): Inpatient  Mobility Level/Assist Equipment: assistance of one, gaitbelt and walker  Fall Risk (Y/N): y  Behavior Concerns: none  Pain Management: Reported mod pain to RLE and was given oxycodone and scheduled Tylenol  Tele/VS/O2: VSS except HTN  ABNL Lab/BG:   Diet: regular. Poor appetite  Bowel/Bladder: incontinent  Skin Concerns:fragile, meplex on back, bruised buttocks, refuses repositioning at times  Drains/Devices:None  Tests/Procedures for next shift:None  Anticipated DC date & active delays:TBD

## 2022-12-30 NOTE — PROGRESS NOTES
"[facility]    Medicine Progress Note - Hospitalist Service    Date of Admission:  12/24/2022    Assessment & Plan      Ham Marie is a 89 year old female who presented to 12/24/2022 with generalized weakness due to acute COVID-19 infection     # Confirmed COVID-19 infection    Describes raspy throat and cough 12/23.  Positive home COVID test     Symptom Onset 12/23/2022   Date of 1st Positive Test 12/24/2022   Vaccination Status Fully Vaccinated         - COVID-19 special precautions, continuous pulse-ox  - Oxygen: Patient is not currently requiring supplemental oxygen.  If needed, titrate to keep SpO2 between 90-96%  - Labs: BMP ok, CRP 35 --> 109 --> 74, LFTs ok, CBC ok,  INR 1.4  - Imaging: CXR 12/25 -mild bibasilar atelectasis.  No focal infiltrate.    - Breathing treatments: no inhalers needed; avoid nebulizers in favor of MDIs   - IV fluids: 500 mL bolus on admission.  No other fluids needed.  - Antibiotics: not indicated   - COVID-Focused Medications: Remdesivir x 3 days, completed  - DVT Prophylaxis:         - At high risk of thrombotic complications due to COVID-19 (DDimer = N/A )         - Already on therapeutic anticoagulation with Eliquis  -CRP is improving     Generalized weakness and physical deconditioning:   Currently lives at home independently.  Grandson lives at her home as well, though leaves for work during the day.  Patient recognizes that her living situation had already become tenuous, daughter was in the process of looking for care facilities prior to this hospitalization.  -Care management consultation for disposition planning  -Physical therapy consulted-have recommended TCU/LTC     Unstageable sacral pressure injury: Present on admission.    Patient reports that she has had a pressure ulceration of for \"years.\"  -Offloading, frequent mobilization.    -Physical therapy consulted  -Wound nurse consulted.  Continue wound care per [facility]     History of DVT, " "pulmonary embolism:  -Continue Eliquis     Chronic atrial fibrillation:  -Continue Eliquis  -Due to bradycardia with heart rate in 50s, metoprolol decreased to 12.5 mg twice daily       Urinary frequency  -Continue Myrbetriq    Right knee pain and swelling-likely due to osteoarthritis  Chronic bilateral lower extremity pain  - Right knee is mildly swollen, warm and slightly tender to touch.  No trauma or falls. No known history of gout  - X-ray showed no acute fractures or malalignment.  Showed moderate to severe medial compartment degenerative changes  -Orthopedics consulted.  Have recommended Tylenol, Voltaren, weightbearing as tolerated, PT/OT  -Cortisone injection deferred as patient reported her knee is feeling fine but could be considered as outpatient  -CRP is improving.  The knee is not warm anymore.    -She has chronic bilateral lower extremity pain  -Continue Tylenol tid scheduled, as needed oxycodone for pain control      Hyponatremia: Lowest Na = 130 mmol/L   - monitor as appropriate.  Now 133    Hypoalbuminemia: Lowest albumin = 3.2 g/dL   - monitor as appropriate    Drug Induced Coagulation Defect: Due to Eliquis     Overweight: Estimated body mass index is 28.75 kg/m  as calculated from the following:    Height as of this encounter: 1.6 m (5' 3\").    Weight as of this encounter: 73.6 kg (162 lb 4.8 oz).             Diet: Combination Diet Regular Diet Adult    DVT Prophylaxis: DOAC  Barney Catheter: Not present  Central Lines: None  Cardiac Monitoring: None  Code Status: No CPR- Do NOT Intubate      Disposition Plan      Expected Discharge Date: 01/04/2023        Discharge Comments: Covid + --placement. Maybe COVID recovered on Jan 4th.        The patient's care was discussed with the Patient.    Laurie Archibald MD  Hospitalist Service  [facility]  Securely message with the Vocera Web Console (learn more here)  Text page via [employer] Paging/Directory         Clinically Significant " "Risk Factors         # Hyponatremia: Lowest Na = 133 mmol/L in last 2 days, will monitor as appropriate      # Hypoalbuminemia: Lowest albumin = 3.2 g/dL at 12/24/2022 11:21 PM, will monitor as appropriate            # Overweight: Estimated body mass index is 29.58 kg/m  as calculated from the following:    Height as of this encounter: 1.6 m (5' 3\").    Weight as of this encounter: 75.8 kg (167 lb)., PRESENT ON ADMISSION         ______________________________________________________________________    Interval History   Sitting in chair.  Upset today as she has been waiting for someone to give her a blanket and put her back into bed.  Reports she is breathing.  Other than this, she has no other complaints.  No chest pain or shortness of breath.  Chronic lower extremity pain is controlled.  She is not on oxygen.      Data reviewed today: I reviewed all medications, new labs and imaging results over the last 24 hours. I personally reviewed no images or EKG's today.    Physical Exam   Vital Signs: Temp: 98.1  F (36.7  C) Temp src: Axillary BP: 133/67 Pulse: 57   Resp: 16 SpO2: 99 % O2 Device: None (Room air)    Weight: 167 lbs 0 oz    Constitutional- patient is awake and alert, resting in bed, in no acute distress  Cardiovascular- Irregular rate and rhythm, no murmurs, no edema  Pulmonary-lungs are clear to auscultation bilaterally, no wheezing or rhonchi  GI-abdomen is soft, nontender, nondistended, no hepatosplenomegaly or masses  Integumentary-skin is warm and dry, no rashes or ulcers  Neurological- alert and oriented x3.  Moving all 4 extremities, normal speech, no focal deficits  Musculoskeletal - right knee is not warm today on exam     Data   Recent Labs   Lab 12/29/22  0635 12/28/22  0637 12/27/22  0626 12/26/22  1457 12/26/22  1141 12/24/22  2323 12/24/22  2321   WBC  --   --  5.3 7.6  --   --  9.5   HGB  --   --  11.3* 12.1  --   --  12.5   MCV  --   --  100 101*  --   --  102*   PLT  --   --  172 202  --   " --  212   INR  --   --   --   --   --  1.40*  --     133 132*  --    < >  --  133   POTASSIUM 4.3 4.0 3.9  --    < >  --  4.4   CHLORIDE 102 103 101  --    < >  --  101   CO2 22 24 25  --    < >  --  27   BUN 23 25 26  --    < >  --  24   CR 0.72 0.65 0.70  --    < >  --  0.80   ANIONGAP 9 6 6  --    < >  --  5   APURVA 8.3* 8.3* 8.1*  --    < >  --  9.3   GLC 94 103* 94  --    < >  --  115*   ALBUMIN  --   --   --   --   --   --  3.2*   PROTTOTAL  --   --   --   --   --   --  7.2   BILITOTAL  --   --   --   --   --   --  1.0   ALKPHOS  --   --   --   --   --   --  74   ALT  --   --   --   --   --   --  15   AST  --   --   --   --   --   --  20    < > = values in this interval not displayed.     No results found for this or any previous visit (from the past 24 hour(s)).  Medications     - MEDICATION INSTRUCTIONS -         acetaminophen  975 mg Oral Q8H     apixaban ANTICOAGULANT  5 mg Oral BID     diclofenac  4 g Topical 4x Daily     levothyroxine  50 mcg Oral Daily     metoprolol tartrate  12.5 mg Oral BID     mirabegron  50 mg Oral Daily     multivitamin  with lutein  1 capsule Oral BID     sodium chloride (PF)  3 mL Intracatheter Q8H

## 2022-12-30 NOTE — PLAN OF CARE
Shift:[phone] 12/30/22  Summary: COVID +  Orientation/Cognitive: a/Ox4  Observation Goals (Met/ Not Met): INP  Mobility Level/Assist Equipment: SBA w/ walker   Fall Risk (Y/N): Yes  Behavior Concerns: none  Pain Management: Scheduled tylenol for pain this shift   Tele/VS/O2: VSS on room air, expt bradycardic at times   ABNL Lab/BG:CRP 49.9  Diet: regular, up to chair for meals  Bowel/Bladder: incontinent at times   Skin Concerns: bruising to buttocks and left hand, Refuses repositioning at times. mepilex in place dressing change due 12/31. +2, +3   Drains/Devices: IV SL  Tests/Procedures for next shift: none  Anticipated DC date & active delays: Pending improvement   Patient Stated Goal for Today: to feel better  Other important info:

## 2022-12-30 NOTE — PLAN OF CARE
Goal Outcome Evaluation:       Orientation/Cognitive: alert and oriented  Observation Goals (Met/ Not Met): not met  Mobility Level/Assist Equipment: assistance of one, gaitbelt and walker  Fall Risk (Y/N): y  Behavior Concerns: n  Pain Management:n  Tele/VS/O2: slight elevated blood prseeure, low heart rate. Metoprolol held  ABNL Lab/BG: low NA, cpr elevated  Diet: regular. Poor appetite  Bowel/Bladder: incontinent  Skin Concerns:fragile, meplex on back, bruised buttocks  Drains/Devices: n  Tests/Procedures for next shift:n  Anticipated DC date & active delays:TBD  Patient Stated Goal for Today: yes, in iso for covid

## 2022-12-30 NOTE — PROGRESS NOTES
Care Management Follow Up    Length of Stay (days): 4    Expected Discharge Date: 01/04/2023     Concerns to be Addressed:       Patient plan of care discussed at interdisciplinary rounds: Yes    Anticipated Discharge Disposition: Transitional Care     Anticipated Discharge Services: None  Anticipated Discharge DME: None    Patient/family educated on Medicare website which has current facility and service quality ratings: yes  Education Provided on the Discharge Plan:    Patient/Family in Agreement with the Plan: yes    Referrals Placed by CM/SW:    Private pay costs discussed: Not applicable    Additional Information:  Plan for TCU once COVID Recovered- anticipate COVID Recovered on 1/4/2. Patient will need prior auth before she can discharge. Writer sent referrals to [facility] for placement once recovered.     Chasidy Escobar, MSW, LGSW   Social Work   [facility]

## 2022-12-31 NOTE — PROGRESS NOTES
Care Management Follow Up    Length of Stay (days): 5    Expected Discharge Date: 01/04/2023     Concerns to be Addressed:       Patient plan of care discussed at interdisciplinary rounds: Yes    Anticipated Discharge Disposition: Transitional Care     Anticipated Discharge Services: None  Anticipated Discharge DME: None    Patient/family educated on Medicare website which has current facility and service quality ratings: yes  Education Provided on the Discharge Plan:    Patient/Family in Agreement with the Plan: yes    Referrals Placed by CM/SW:    Private pay costs discussed: Not applicable    Additional Information:  Spoke with Bonita in admissions at [facility]. They can accept patient into a private room with fee waived on Wednesday January 4th when patient is COVID recovered. Bonita is aware patient needs prior auth and business office will submit for auth on Monday.     TY Flores, LGSW   Social Work   [facility]

## 2022-12-31 NOTE — PLAN OF CARE
Orientation/Cognitive: A&Ox4, forgetful; [place] and poor vision  Observation Goals (Met/ Not Met): INP  Mobility Level/Assist Equipment: Ax1GB/W, T&R  Fall Risk (Y/N):Yes  Behavior Concerns: None  Pain Management: C/o R leg pain, scheduled Tylenol given.  Tele/VS/O2:  VSS on RA  ABNL Lab/BG: Covid +, special precautions maintained.  CRP 49.9  Diet: Regular  Bowel/Bladder: purewick in place when in bed. Incontinent at times, unaware of going.  Skin Concerns: Bilat knee edema, scattered bruising; bruising to buttocks. Mepilex in place. Skin tags scattered.  Drains/Devices: PIV S/L, Pure wick  Tests/Procedures for next shift: None  Anticipated DC date & active delays: Discharge to TCU pending covid status, recovered 1/4/23.  SW following.   Patient Stated Goal for Today: Get sleep  Other: GRANT, LS clear.  Infrequent weak cough.  1-2+ BLE edema.

## 2022-12-31 NOTE — PROGRESS NOTES
"[facility]    Medicine Progress Note - Hospitalist Service    Date of Admission:  12/24/2022    Assessment & Plan      Ham Marie is a 89 year old female who presented to 12/24/2022 with generalized weakness due to acute COVID-19 infection     # Confirmed COVID-19 infection    Describes raspy throat and cough 12/23.  Positive home COVID test     Symptom Onset 12/23/2022   Date of 1st Positive Test 12/24/2022   Vaccination Status Fully Vaccinated         - COVID-19 special precautions, continuous pulse-ox  - Oxygen: Patient is not currently requiring supplemental oxygen.  If needed, titrate to keep SpO2 between 90-96%  - Labs: BMP ok, CRP 35 --> 109 --> 74, LFTs ok, CBC ok,  INR 1.4  - Imaging: CXR 12/25 -mild bibasilar atelectasis.  No focal infiltrate.    - Breathing treatments: no inhalers needed; avoid nebulizers in favor of MDIs   - IV fluids: 500 mL bolus on admission.  No other fluids needed.  - Antibiotics: not indicated   - COVID-Focused Medications: Remdesivir x 3 days, completed  - DVT Prophylaxis:         - At high risk of thrombotic complications due to COVID-19 (DDimer = N/A )         - Already on therapeutic anticoagulation with Eliquis  -CRP is improving     Generalized weakness and physical deconditioning:   Currently lives at home independently.  Grandson lives at her home as well, though leaves for work during the day.  Patient recognizes that her living situation had already become tenuous, daughter was in the process of looking for care facilities prior to this hospitalization.  -Care management consultation for disposition planning  -Physical therapy consulted-have recommended TCU/LTC     Unstageable sacral pressure injury: Present on admission.    Patient reports that she has had a pressure ulceration of for \"years.\"  -Offloading, frequent mobilization.    -Physical therapy consulted  -Wound nurse consulted.  Continue wound care per [facility]    Acute aspiration " "pneumonitis, 12/31  -Patient had an aspiration event while taking medications in morning of 12/31.  RRT was called.  Oxygen saturations remained over 90% but she complains of shortness of breath  -Obtain chest x-ray  -Albuterol inhaler 4 times daily  -Keep n.p.o. until speech therapy evaluation  -Consult speech therapy     History of DVT, pulmonary embolism:  -Continue Eliquis     Chronic atrial fibrillation:  -Continue Eliquis  -Due to bradycardia with heart rate in 50s, metoprolol decreased to 12.5 mg twice daily       Urinary frequency  -Continue Myrbetriq    Right knee pain and swelling-likely due to osteoarthritis  Chronic bilateral lower extremity pain  - Right knee is mildly swollen, warm and slightly tender to touch.  No trauma or falls. No known history of gout  - X-ray showed no acute fractures or malalignment.  Showed moderate to severe medial compartment degenerative changes  -Orthopedics consulted.  Have recommended Tylenol, Voltaren, weightbearing as tolerated, PT/OT  -Cortisone injection deferred as patient reported her knee is feeling fine but could be considered as outpatient  -CRP is improving.  The knee is not warm anymore.    -She has chronic bilateral lower extremity pain  -Continue Tylenol tid scheduled, as needed oxycodone for pain control      Hyponatremia: Lowest Na = 130 mmol/L   - monitor as appropriate.  Now 133    Hypoalbuminemia: Lowest albumin = 3.2 g/dL   - monitor as appropriate    Drug Induced Coagulation Defect: Due to Eliquis     Overweight: Estimated body mass index is 28.75 kg/m  as calculated from the following:    Height as of this encounter: 1.6 m (5' 3\").    Weight as of this encounter: 73.6 kg (162 lb 4.8 oz).             Diet: NPO for Medical/Clinical Reasons Except for: Other, Meds; Specify: small spoonfuls of thickened water with staff present    DVT Prophylaxis: DOAC  Barney Catheter: Not present  Central Lines: None  Cardiac Monitoring: None  Code Status: No CPR- Do NOT " "Intubate      Disposition Plan      Expected Discharge Date: 01/04/2023        Discharge Comments: Covid + --placement. Maybe COVID recovered on Jan 4th.  Speech consult today following choking incident.        The patient's care was discussed with the Patient.    Laurie Archibald MD  Hospitalist Service  [facility]  Securely message with the [employer]oc[employer] Web Console (learn more here)  Text page via [facility] Paging/Directory         Clinically Significant Risk Factors              # Hypoalbuminemia: Lowest albumin = 3.2 g/dL at 12/24/2022 11:21 PM, will monitor as appropriate            # Overweight: Estimated body mass index is 29.58 kg/m  as calculated from the following:    Height as of this encounter: 1.6 m (5' 3\").    Weight as of this encounter: 75.8 kg (167 lb).          ______________________________________________________________________    Interval History   Sitting in chair.  Reports she feels short of breath   Had an aspiration event this morning while taking medications   Oxygen saturation remains over 90%       Data reviewed today: I reviewed all medications, new labs and imaging results over the last 24 hours. I personally reviewed no images or EKG's today.    Physical Exam   Vital Signs: Temp: 97.6  F (36.4  C) Temp src: Oral BP: (!) 147/88 Pulse: 61   Resp: 16 SpO2: 95 % O2 Device: None (Room air)    Weight: 167 lbs 0 oz    Constitutional- patient is awake and alert, resting in bed, in no acute distress  Cardiovascular- Irregular rate and rhythm, no murmurs, no edema  Pulmonary-lungs are clear to auscultation bilaterally, no wheezing or rhonchi  GI-abdomen is soft, nontender, nondistended, no hepatosplenomegaly or masses  Integumentary-skin is warm and dry, no rashes or ulcers  Neurological- alert and oriented x3.  Moving all 4 extremities, normal speech, no focal deficits  Musculoskeletal - right knee is not warm today on exam     Data   Recent Labs   Lab 12/29/22  0635 " 12/28/22  0637 12/27/22  0626 12/26/22  1457 12/26/22  1141 12/24/22  2323 12/24/22  2321   WBC  --   --  5.3 7.6  --   --  9.5   HGB  --   --  11.3* 12.1  --   --  12.5   MCV  --   --  100 101*  --   --  102*   PLT  --   --  172 202  --   --  212   INR  --   --   --   --   --  1.40*  --     133 132*  --    < >  --  133   POTASSIUM 4.3 4.0 3.9  --    < >  --  4.4   CHLORIDE 102 103 101  --    < >  --  101   CO2 22 24 25  --    < >  --  27   BUN 23 25 26  --    < >  --  24   CR 0.72 0.65 0.70  --    < >  --  0.80   ANIONGAP 9 6 6  --    < >  --  5   APURVA 8.3* 8.3* 8.1*  --    < >  --  9.3   GLC 94 103* 94  --    < >  --  115*   ALBUMIN  --   --   --   --   --   --  3.2*   PROTTOTAL  --   --   --   --   --   --  7.2   BILITOTAL  --   --   --   --   --   --  1.0   ALKPHOS  --   --   --   --   --   --  74   ALT  --   --   --   --   --   --  15   AST  --   --   --   --   --   --  20    < > = values in this interval not displayed.     No results found for this or any previous visit (from the past 24 hour(s)).  Medications     - MEDICATION INSTRUCTIONS -         acetaminophen  975 mg Oral Q8H     albuterol  2 puff Inhalation 4x Daily     apixaban ANTICOAGULANT  5 mg Oral BID     diclofenac  4 g Topical 4x Daily     levothyroxine  50 mcg Oral Daily     metoprolol tartrate  12.5 mg Oral BID     mirabegron  50 mg Oral Daily     [Held by provider] multivitamin  with lutein  1 capsule Oral BID     sodium chloride (PF)  3 mL Intracatheter Q8H

## 2022-12-31 NOTE — PLAN OF CARE
Shift:[phone] 12/31/22  Summary: COVID +  Orientation/Cognitive: A/Ox4, [place]  Observation Goals (Met/ Not Met): INP  Mobility Level/Assist Equipment: SBA w/ walker   Fall Risk (Y/N): Yes  Behavior Concerns: none  Pain Management: Pt refused pain meds  Tele/VS/O2: VSS on room air, expt bradycardic at times   ABNL Lab/BG:CRP 49.9  Diet: Easy to chew, thin liquids diet, up to chair for meals  Bowel/Bladder: incontinent at times   Skin Concerns: bruising to buttocks and left hand, Refuses repositioning at times. mepilex dressing changed today, wound care completed. +1, +2 edema to BLE, legs elevated in bed  Drains/Devices: IV SL  Tests/Procedures for next shift: chest xray completed  Anticipated DC date & active delays: Pending improvement   Patient Stated Goal for Today: to feel better  Other important info: Pt aspirated while taking medication this morning. Pt was able to swallow pills with sips of water one at a time without difficulty until last pill. Pt started to cough after taking a sip of water with last pill. Pt was encouraged by writer to continue coughing. Pt was able to breath and oxygen level remained above 95% on room air. LS clear. House NP called to bedside. Family member update.

## 2022-12-31 NOTE — PLAN OF CARE
Goal Outcome Evaluation:                      Patient A&0x4. Up with SBA GB and walker. Up to chair for meals. VSS. She is on RA. 02 96 - 99% on RA , falls to about 91% with activity. BRP. Lungs clear. Trace edema noted on BLE. Denies pain. Tolerating regular diet. DNR/I. Will be covid recovered on 1/4/23 then to TCU

## 2022-12-31 NOTE — CODE/RAPID RESPONSE
"[facility] NP Note  12/31/2022  0845    Called following acute aspiration of medication event. Pt felt the pill \"go down wrong\". On my arrival, she is upright on the side of the bed, coughing. Her cough is strong, and sounds productive but she states she still feels it in her lungs. Lungs are clear throughout all fields to posterior auscultation, anterior upper airways sound clear. Sats are 98% on RA. She is mildly tachypneic and anxious as expected. BP higher than typical at 160s/100s, HR 60s.     With assistance of staff, she is able to swallow thickened, warm water, which helps her symptoms. She denies any chest pain, but endorses that her breathing feels irritable. After approximately 15 min, she is slowly improving.   - NPO with exception of RN assisted spoonfuls of water until SLP eval (I suspect restricting this will cause significant stress to the patient).  - SLP consult  - listed meds ok for now under NPO status, she has had her AM meds and is not due for any meds soon. Will defer adjustment of PO meds to SLP and primary Hospitalist.       LASHAY Grossman [facility]  Hospitalist Service  House Officer  Pager: [phone] (1k - 6p)      "

## 2022-12-31 NOTE — PROGRESS NOTES
"Swallow evaluation completed after aspiration event with pill this AM.     Incident appears isolated - likely related to incoordination due to small pill, water via straw. Pt reports this has not happened before, no hx of dysphagia noted by pt or chart review.    Rec initiate easy to chew diet (7A) with thin liquids, no straws, slow rate, small bites/sips, meds in applesauce (whole).    SLP follow-up 4x/wk per POC.     Clinical Bedside Swallow Evaluation  [facility] Inpatient  12/31/22 1578   Appointment Info   Signing Clinician's Name / Credentials (SLP) Giuseppe Mensah MA, CCC-SLP   General Information   Onset of Illness/Injury or Date of Surgery 12/31/22   Referring Physician LASHAY Thacker, CNP   Patient/Family Therapy Goal Statement (SLP) \"I'm scared to eat.\"   Pertinent History of Current Problem Ham Marie is a 89 year old female who presented to 12/24/2022 with generalized weakness due to acute COVID-19 infection.   General Observations RRT note 12/31/2022: Called following acute aspiration of medication event. Pt felt the pill \"go down wrong\". On my arrival, she is upright on the side of the bed, coughing. Her cough is strong, and sounds productive but she states she still feels it in her lungs. Lungs are clear throughout all fields to posterior auscultation, anterior upper airways sound clear. Sats are 98% on RA. She is mildly tachypneic and anxious as expected. BP higher than typical at 160s/100s, HR 60s.      With assistance of staff, she is able to swallow thickened, warm water, which helps her symptoms. She denies any chest pain, but endorses that her breathing feels irritable. After approximately 15 min, she is slowly improving.   - NPO with exception of RN assisted spoonfuls of water until SLP eval (I suspect restricting this will cause significant stress to the patient).  - SLP consult  - listed meds ok for now under NPO status, she has had her AM meds and is not " due for any meds soon. Will defer adjustment of PO meds to SLP and primary Hospitalist.       Per RN, no difficulty with PO or meds throughout admission. Last smaller pill this AM with ? too much water. Pt reports takes pills at times with applesauce, use straw this morning. Pt hesitant for PO but agreeable.      Past Medical History:   Diagnosis Date     Actinic keratoses      Arthritis      Carpal tunnel syndrome     right     DVT (deep venous thrombosis) (H) 2000    right      Heart murmur      Hypertension      Other and unspecified hyperlipidemia      Pulmonary embolism (H) 2000     Unspecified hypothyroidism        Type of Evaluation   Type of Evaluation Swallow Evaluation   Dentition (Oral Motor)   Dentition (Oral Motor) natural dentition;adequate dentition   Facial Symmetry (Oral Motor)   Facial Symmetry (Oral Motor) WNL   Lip Function (Oral Motor)   Comment, Lip Function (Oral Motor) Mild generalized weakness, WFL   Tongue Function (Oral Motor)   Comment, Tongue Function (Oral Motor) Mild generalized weakness, WFL   Jaw Function (Oral Motor)   Comment, Jaw Function (Oral Motor) WFL   Cough/Swallow/Gag Reflex (Oral Motor)   Comment, Cough/Swallow/Gag Reflex (Oral Motor) WFL; cough following event this AM   Vocal Quality/Secretion Management (Oral Motor)   Comment, Vocal Quality/Secretion Management (Oral Motor) Hoarse following episode this AM; improved with PO trials   General Swallowing Observations   Past History of Dysphagia None per pt or chart review   Respiratory Support (General Swallowing Observations) none   Current Diet/Method of Nutritional Intake (General Swallowing Observations, NIS) NPO  (until SLP; previously tolerating regular/thin)   Swallowing Evaluation Clinical swallow evaluation   Clinical Swallow Evaluation   Feeding Assistance no assistance needed   Clinical Swallow Evaluation Textures Trialed thin liquids;pureed;solid foods   Clinical Swallow Eval: Thin Liquid Texture Trial   Mode of  Presentation, Thin Liquids cup;spoon;self-fed;fed by clinician   Volume of Liquid or Food Presented ice chips x2, sips of water   Oral Phase of Swallow WFL   Pharyngeal Phase of Swallow intact   Diagnostic Statement WFL; timely swallow, no overt s/sx of aspiration   Clinical Swallow Evaluation: Puree Solid Texture Trial   Mode of Presentation, Puree spoon;self-fed;fed by clinician   Volume of Puree Presented tsp bites applesauce   Oral Phase, Puree WFL   Pharyngeal Phase, Puree intact   Diagnostic Statement WFL; timely swallow, no overt s/sx of aspiration   Clinical Swallow Evaluation: Solid Food Texture Trial   Mode of Presentation self-fed   Volume Presented small bites cracker   Oral Phase WFL   Pharyngeal Phase intact   Diagnostic Statement WFL   Swallowing Recommendations   Diet Consistency Recommendations easy to chew (level 7);thin liquids (level 0)   Supervision Level for Intake distant supervision needed   Mode of Delivery Recommendations bolus size, small;no straws;slow rate of intake   Swallowing Maneuver Recommendations alternate food and liquid intake   Monitoring/Assistance Required (Eating/Swallowing) stop eating activities when fatigue is present;monitor for cough or change in vocal quality with intake   Recommended Feeding/Eating Techniques (Swallow Eval) maintain upright sitting position for eating;maintain upright posture during/after eating for 30 minutes   Medication Administration Recommendations, Swallowing (SLP) whole with applesauce/pudding   Instrumental Assessment Recommendations instrumental evaluation not recommended at this time   General Therapy Interventions   Planned Therapy Interventions Dysphagia Treatment   Dysphagia treatment Instruction of safe swallow strategies;Compensatory strategies for swallowing   Clinical Impression   Criteria for Skilled Therapeutic Interventions Met (SLP Eval) Yes, treatment indicated   SLP Diagnosis minimal oral-pharyngeal dysphagia   Risks & Benefits  of therapy have been explained evaluation/treatment results reviewed;care plan/treatment goals reviewed;risks/benefits reviewed;current/potential barriers reviewed;participants voiced agreement with care plan;participants included;patient   Clinical Impression Comments Pt demonstrates mild generalized oral motor weakness; WFL. Oral-pharyngeal swallow WFL for ice chip, thin liquids, puree and solid textures with small bites/sips, no straws, slow rate. Likely isolated incident this AM due to incoordination of small pill, liquids via straw.   SLP Total Evaluation Time   Eval: oral/pharyngeal swallow function, clinical swallow Minutes (61702) 10   SLP Discharge Planning   SLP Plan Meal f/u, adjust diet prn   SLP Discharge Recommendation home with assist   SLP Rationale for DC Rec Pt at/near baseline for swallow function. Will likely meet goals prior to discharge.Defer further d/c needs to PT/OT and medical team.   SLP Brief overview of current status  Swallow evaluation completed after aspiration event with pill this AM. Incident appears isolated - likely related to incoordination due to small pill, water via straw. Rec initiate easy to chew diet (7A) with thin liquids, no straws, slow rate, small bites/sips, meds in applesauce (whole).

## 2023-01-01 ENCOUNTER — TRANSITIONAL CARE UNIT VISIT (OUTPATIENT)
Dept: GERIATRICS | Facility: CLINIC | Age: 88
End: 2023-01-01
Payer: COMMERCIAL

## 2023-01-01 ENCOUNTER — APPOINTMENT (OUTPATIENT)
Dept: GENERAL RADIOLOGY | Facility: CLINIC | Age: 88
End: 2023-01-01
Attending: INTERNAL MEDICINE
Payer: COMMERCIAL

## 2023-01-01 ENCOUNTER — LAB REQUISITION (OUTPATIENT)
Dept: LAB | Facility: CLINIC | Age: 88
End: 2023-01-01

## 2023-01-01 ENCOUNTER — PATIENT OUTREACH (OUTPATIENT)
Dept: CARE COORDINATION | Facility: CLINIC | Age: 88
End: 2023-01-01
Payer: COMMERCIAL

## 2023-01-01 ENCOUNTER — APPOINTMENT (OUTPATIENT)
Dept: GENERAL RADIOLOGY | Facility: CLINIC | Age: 88
End: 2023-01-01
Attending: EMERGENCY MEDICINE
Payer: COMMERCIAL

## 2023-01-01 ENCOUNTER — HOSPITAL ENCOUNTER (OUTPATIENT)
Facility: CLINIC | Age: 88
Setting detail: OBSERVATION
Discharge: HOME | End: 2023-06-12
Attending: EMERGENCY MEDICINE | Admitting: HOSPITALIST
Payer: COMMERCIAL

## 2023-01-01 ENCOUNTER — PATIENT OUTREACH (OUTPATIENT)
Dept: CARE COORDINATION | Facility: CLINIC | Age: 88
End: 2023-01-01

## 2023-01-01 ENCOUNTER — APPOINTMENT (OUTPATIENT)
Dept: GENERAL RADIOLOGY | Facility: CLINIC | Age: 88
End: 2023-01-01
Attending: HOSPITALIST
Payer: COMMERCIAL

## 2023-01-01 ENCOUNTER — APPOINTMENT (OUTPATIENT)
Dept: PHYSICAL THERAPY | Facility: CLINIC | Age: 88
DRG: 177 | End: 2023-01-01
Payer: COMMERCIAL

## 2023-01-01 ENCOUNTER — APPOINTMENT (OUTPATIENT)
Dept: PHYSICAL THERAPY | Facility: CLINIC | Age: 88
End: 2023-01-01
Attending: HOSPITALIST
Payer: COMMERCIAL

## 2023-01-01 ENCOUNTER — APPOINTMENT (OUTPATIENT)
Dept: PHYSICAL THERAPY | Facility: CLINIC | Age: 88
End: 2023-01-01
Payer: COMMERCIAL

## 2023-01-01 ENCOUNTER — APPOINTMENT (OUTPATIENT)
Dept: SPEECH THERAPY | Facility: CLINIC | Age: 88
DRG: 177 | End: 2023-01-01
Payer: COMMERCIAL

## 2023-01-01 ENCOUNTER — HOSPITAL ENCOUNTER (OUTPATIENT)
Dept: WOUND CARE | Facility: CLINIC | Age: 88
Discharge: HOME | End: 2023-01-05
Attending: PHYSICIAN ASSISTANT
Payer: COMMERCIAL

## 2023-01-01 ENCOUNTER — DOCUMENTATION ONLY (OUTPATIENT)
Dept: OTHER | Facility: CLINIC | Age: 88
End: 2023-01-01

## 2023-01-01 ENCOUNTER — APPOINTMENT (OUTPATIENT)
Dept: CT IMAGING | Facility: CLINIC | Age: 88
End: 2023-01-01
Attending: EMERGENCY MEDICINE
Payer: COMMERCIAL

## 2023-01-01 ENCOUNTER — APPOINTMENT (OUTPATIENT)
Dept: GENERAL RADIOLOGY | Facility: CLINIC | Age: 88
End: 2023-01-01
Attending: PHYSICIAN ASSISTANT
Payer: COMMERCIAL

## 2023-01-01 ENCOUNTER — DISCHARGE SUMMARY NURSING HOME (OUTPATIENT)
Dept: GERIATRICS | Facility: CLINIC | Age: 88
End: 2023-01-01
Payer: COMMERCIAL

## 2023-01-01 ENCOUNTER — APPOINTMENT (OUTPATIENT)
Dept: CT IMAGING | Facility: CLINIC | Age: 88
End: 2023-01-01
Attending: INTERNAL MEDICINE
Payer: COMMERCIAL

## 2023-01-01 ENCOUNTER — MEDICAL CORRESPONDENCE (OUTPATIENT)
Dept: HEALTH INFORMATION MANAGEMENT | Facility: CLINIC | Age: 88
End: 2023-01-01
Payer: COMMERCIAL

## 2023-01-01 ENCOUNTER — HOSPITAL ENCOUNTER (OUTPATIENT)
Facility: CLINIC | Age: 88
Setting detail: OBSERVATION
Discharge: NURSING, REHAB OR LONG-TERM CARE | End: 2023-04-07
Attending: EMERGENCY MEDICINE | Admitting: STUDENT IN AN ORGANIZED HEALTH CARE EDUCATION/TRAINING PROGRAM
Payer: COMMERCIAL

## 2023-01-01 ENCOUNTER — APPOINTMENT (OUTPATIENT)
Dept: PHYSICAL THERAPY | Facility: CLINIC | Age: 88
End: 2023-01-01
Attending: INTERNAL MEDICINE
Payer: COMMERCIAL

## 2023-01-01 ENCOUNTER — LAB REQUISITION (OUTPATIENT)
Dept: LAB | Facility: CLINIC | Age: 88
End: 2023-01-01
Payer: COMMERCIAL

## 2023-01-01 ENCOUNTER — APPOINTMENT (OUTPATIENT)
Dept: OCCUPATIONAL THERAPY | Facility: CLINIC | Age: 88
End: 2023-01-01
Attending: HOSPITALIST
Payer: COMMERCIAL

## 2023-01-01 VITALS
HEART RATE: 60 BPM | TEMPERATURE: 97.5 F | OXYGEN SATURATION: 96 % | DIASTOLIC BLOOD PRESSURE: 89 MMHG | HEIGHT: 63 IN | WEIGHT: 167 LBS | RESPIRATION RATE: 18 BRPM | SYSTOLIC BLOOD PRESSURE: 140 MMHG | BODY MASS INDEX: 29.59 KG/M2

## 2023-01-01 VITALS
RESPIRATION RATE: 18 BRPM | TEMPERATURE: 97.7 F | WEIGHT: 163.2 LBS | HEIGHT: 63 IN | DIASTOLIC BLOOD PRESSURE: 78 MMHG | SYSTOLIC BLOOD PRESSURE: 164 MMHG | BODY MASS INDEX: 28.92 KG/M2 | OXYGEN SATURATION: 96 % | HEART RATE: 64 BPM

## 2023-01-01 VITALS
DIASTOLIC BLOOD PRESSURE: 87 MMHG | TEMPERATURE: 97.3 F | HEIGHT: 61 IN | OXYGEN SATURATION: 95 % | RESPIRATION RATE: 18 BRPM | HEART RATE: 60 BPM | WEIGHT: 163.3 LBS | BODY MASS INDEX: 30.83 KG/M2 | SYSTOLIC BLOOD PRESSURE: 144 MMHG

## 2023-01-01 VITALS
BODY MASS INDEX: 29.04 KG/M2 | SYSTOLIC BLOOD PRESSURE: 145 MMHG | HEART RATE: 63 BPM | WEIGHT: 153.7 LBS | OXYGEN SATURATION: 97 % | RESPIRATION RATE: 17 BRPM | TEMPERATURE: 98.6 F | DIASTOLIC BLOOD PRESSURE: 71 MMHG

## 2023-01-01 VITALS
DIASTOLIC BLOOD PRESSURE: 78 MMHG | RESPIRATION RATE: 18 BRPM | HEART RATE: 56 BPM | SYSTOLIC BLOOD PRESSURE: 138 MMHG | TEMPERATURE: 98.2 F | OXYGEN SATURATION: 94 %

## 2023-01-01 VITALS
HEIGHT: 61 IN | HEART RATE: 61 BPM | BODY MASS INDEX: 30.81 KG/M2 | RESPIRATION RATE: 18 BRPM | WEIGHT: 163.2 LBS | DIASTOLIC BLOOD PRESSURE: 70 MMHG | TEMPERATURE: 97.7 F | SYSTOLIC BLOOD PRESSURE: 135 MMHG | OXYGEN SATURATION: 100 %

## 2023-01-01 VITALS
SYSTOLIC BLOOD PRESSURE: 159 MMHG | HEART RATE: 69 BPM | TEMPERATURE: 97.8 F | OXYGEN SATURATION: 97 % | RESPIRATION RATE: 18 BRPM | DIASTOLIC BLOOD PRESSURE: 93 MMHG

## 2023-01-01 DIAGNOSIS — I48.20 CHRONIC ATRIAL FIBRILLATION (H): ICD-10-CM

## 2023-01-01 DIAGNOSIS — I51.89 DIASTOLIC DYSFUNCTION: ICD-10-CM

## 2023-01-01 DIAGNOSIS — Z71.89 ADVANCED DIRECTIVES, COUNSELING/DISCUSSION: ICD-10-CM

## 2023-01-01 DIAGNOSIS — S31.000D WOUND OF SACRAL REGION, SUBSEQUENT ENCOUNTER: ICD-10-CM

## 2023-01-01 DIAGNOSIS — I48.21 PERMANENT ATRIAL FIBRILLATION (H): ICD-10-CM

## 2023-01-01 DIAGNOSIS — M17.12 PRIMARY OSTEOARTHRITIS OF LEFT KNEE: ICD-10-CM

## 2023-01-01 DIAGNOSIS — M25.561 ACUTE PAIN OF RIGHT KNEE: ICD-10-CM

## 2023-01-01 DIAGNOSIS — I10 ESSENTIAL HYPERTENSION: ICD-10-CM

## 2023-01-01 DIAGNOSIS — Z86.718 HISTORY OF DEEP VENOUS THROMBOSIS: ICD-10-CM

## 2023-01-01 DIAGNOSIS — R53.81 PHYSICAL DECONDITIONING: ICD-10-CM

## 2023-01-01 DIAGNOSIS — M16.11 PRIMARY OSTEOARTHRITIS OF RIGHT HIP: ICD-10-CM

## 2023-01-01 DIAGNOSIS — M25.552 HIP PAIN, LEFT: ICD-10-CM

## 2023-01-01 DIAGNOSIS — D64.9 ANEMIA, UNSPECIFIED TYPE: ICD-10-CM

## 2023-01-01 DIAGNOSIS — N32.81 OAB (OVERACTIVE BLADDER): ICD-10-CM

## 2023-01-01 DIAGNOSIS — Z86.711 HISTORY OF PULMONARY EMBOLISM: ICD-10-CM

## 2023-01-01 DIAGNOSIS — D64.9 ANEMIA, UNSPECIFIED: ICD-10-CM

## 2023-01-01 DIAGNOSIS — M19.90 OSTEOARTHRITIS, UNSPECIFIED OSTEOARTHRITIS TYPE, UNSPECIFIED SITE: ICD-10-CM

## 2023-01-01 DIAGNOSIS — U07.1 INFECTION DUE TO 2019 NOVEL CORONAVIRUS: Primary | ICD-10-CM

## 2023-01-01 DIAGNOSIS — E03.9 HYPOTHYROIDISM, UNSPECIFIED TYPE: ICD-10-CM

## 2023-01-01 DIAGNOSIS — M25.562 LEFT KNEE PAIN, UNSPECIFIED CHRONICITY: ICD-10-CM

## 2023-01-01 DIAGNOSIS — L89.150 PRESSURE INJURY OF SACRAL REGION, UNSTAGEABLE (H): ICD-10-CM

## 2023-01-01 DIAGNOSIS — E87.1 HYPONATREMIA: ICD-10-CM

## 2023-01-01 DIAGNOSIS — E87.1 HYPO-OSMOLALITY AND HYPONATREMIA: ICD-10-CM

## 2023-01-01 DIAGNOSIS — R35.0 URINARY FREQUENCY: ICD-10-CM

## 2023-01-01 DIAGNOSIS — R13.10 DYSPHAGIA, UNSPECIFIED TYPE: ICD-10-CM

## 2023-01-01 DIAGNOSIS — R26.2 UNABLE TO AMBULATE: ICD-10-CM

## 2023-01-01 DIAGNOSIS — Z00.01 ENCOUNTER FOR GENERAL ADULT MEDICAL EXAMINATION WITH ABNORMAL FINDINGS: ICD-10-CM

## 2023-01-01 DIAGNOSIS — N39.0 URINARY TRACT INFECTION, SITE NOT SPECIFIED: ICD-10-CM

## 2023-01-01 DIAGNOSIS — M15.0 PRIMARY OSTEOARTHRITIS INVOLVING MULTIPLE JOINTS: ICD-10-CM

## 2023-01-01 DIAGNOSIS — Z86.718 PERSONAL HISTORY OF DVT (DEEP VEIN THROMBOSIS): ICD-10-CM

## 2023-01-01 DIAGNOSIS — T14.8XXA DEEP TISSUE INJURY: ICD-10-CM

## 2023-01-01 DIAGNOSIS — E78.5 DYSLIPIDEMIA: ICD-10-CM

## 2023-01-01 LAB
ALBUMIN UR-MCNC: 30 MG/DL
ALBUMIN UR-MCNC: NEGATIVE MG/DL
ALBUMIN UR-MCNC: NEGATIVE MG/DL
ANION GAP SERPL CALCULATED.3IONS-SCNC: 10 MMOL/L (ref 7–15)
ANION GAP SERPL CALCULATED.3IONS-SCNC: 13 MMOL/L (ref 7–15)
ANION GAP SERPL CALCULATED.3IONS-SCNC: 13 MMOL/L (ref 7–15)
ANION GAP SERPL CALCULATED.3IONS-SCNC: 6 MMOL/L (ref 3–14)
APPEARANCE UR: ABNORMAL
APPEARANCE UR: CLEAR
APPEARANCE UR: CLEAR
BACTERIA #/AREA URNS HPF: ABNORMAL /HPF
BACTERIA UR CULT: NORMAL
BASOPHILS # BLD AUTO: 0 10E3/UL (ref 0–0.2)
BASOPHILS # BLD AUTO: 0.1 10E3/UL (ref 0–0.2)
BASOPHILS NFR BLD AUTO: 0 %
BASOPHILS NFR BLD AUTO: 1 %
BILIRUB UR QL STRIP: NEGATIVE
BUN SERPL-MCNC: 16.7 MG/DL (ref 8–23)
BUN SERPL-MCNC: 27 MG/DL (ref 8–23)
BUN SERPL-MCNC: 31.4 MG/DL (ref 8–23)
BUN SERPL-MCNC: 35 MG/DL (ref 7–30)
CALCIUM SERPL-MCNC: 9 MG/DL (ref 8.5–10.1)
CALCIUM SERPL-MCNC: 9.1 MG/DL (ref 8.2–9.6)
CALCIUM SERPL-MCNC: 9.3 MG/DL (ref 8.2–9.6)
CALCIUM SERPL-MCNC: 9.8 MG/DL (ref 8.2–9.6)
CHLORIDE BLD-SCNC: 99 MMOL/L (ref 94–109)
CHLORIDE SERPL-SCNC: 95 MMOL/L (ref 98–107)
CHLORIDE SERPL-SCNC: 96 MMOL/L (ref 98–107)
CHLORIDE SERPL-SCNC: 96 MMOL/L (ref 98–107)
CO2 SERPL-SCNC: 28 MMOL/L (ref 20–32)
COLOR UR AUTO: ABNORMAL
COLOR UR AUTO: NORMAL
COLOR UR AUTO: YELLOW
CREAT SERPL-MCNC: 0.65 MG/DL (ref 0.51–0.95)
CREAT SERPL-MCNC: 0.66 MG/DL (ref 0.51–0.95)
CREAT SERPL-MCNC: 0.69 MG/DL (ref 0.51–0.95)
CREAT SERPL-MCNC: 0.71 MG/DL (ref 0.52–1.04)
CRP SERPL-MCNC: 59.95 MG/L
CRP SERPL-MCNC: 60.42 MG/L
DEPRECATED HCO3 PLAS-SCNC: 22 MMOL/L (ref 22–29)
DEPRECATED HCO3 PLAS-SCNC: 22 MMOL/L (ref 22–29)
DEPRECATED HCO3 PLAS-SCNC: 27 MMOL/L (ref 22–29)
EOSINOPHIL # BLD AUTO: 0.2 10E3/UL (ref 0–0.7)
EOSINOPHIL # BLD AUTO: 0.5 10E3/UL (ref 0–0.7)
EOSINOPHIL NFR BLD AUTO: 3 %
EOSINOPHIL NFR BLD AUTO: 6 %
ERYTHROCYTE [DISTWIDTH] IN BLOOD BY AUTOMATED COUNT: 14.3 % (ref 10–15)
ERYTHROCYTE [DISTWIDTH] IN BLOOD BY AUTOMATED COUNT: 14.6 % (ref 10–15)
ERYTHROCYTE [DISTWIDTH] IN BLOOD BY AUTOMATED COUNT: 16.7 % (ref 10–15)
ERYTHROCYTE [DISTWIDTH] IN BLOOD BY AUTOMATED COUNT: 16.8 % (ref 10–15)
FASTING STATUS PATIENT QL REPORTED: YES
GAMMA INTERFERON BACKGROUND BLD IA-ACNC: 0.03 IU/ML
GFR SERPL CREATININE-BSD FRML MDRD: 75 ML/MIN/1.73M2
GFR SERPL CREATININE-BSD FRML MDRD: 76 ML/MIN/1.73M2
GFR SERPL CREATININE-BSD FRML MDRD: 77 ML/MIN/1.73M2
GFR SERPL CREATININE-BSD FRML MDRD: 77 ML/MIN/1.73M2
GLUCOSE BLD-MCNC: 87 MG/DL (ref 70–99)
GLUCOSE SERPL-MCNC: 111 MG/DL (ref 70–99)
GLUCOSE SERPL-MCNC: 112 MG/DL (ref 70–99)
GLUCOSE SERPL-MCNC: 118 MG/DL (ref 70–99)
GLUCOSE SERPL-MCNC: 118 MG/DL (ref 70–99)
GLUCOSE UR STRIP-MCNC: NEGATIVE MG/DL
HCT VFR BLD AUTO: 37.1 % (ref 35–47)
HCT VFR BLD AUTO: 38.3 % (ref 35–47)
HCT VFR BLD AUTO: 39.9 % (ref 35–47)
HCT VFR BLD AUTO: 39.9 % (ref 35–47)
HGB BLD-MCNC: 11.8 G/DL (ref 11.7–15.7)
HGB BLD-MCNC: 12.5 G/DL (ref 11.7–15.7)
HGB BLD-MCNC: 13.2 G/DL (ref 11.7–15.7)
HGB BLD-MCNC: 13.4 G/DL (ref 11.7–15.7)
HGB UR QL STRIP: NEGATIVE
HYALINE CASTS: 10 /LPF
IMM GRANULOCYTES # BLD: 0 10E3/UL
IMM GRANULOCYTES # BLD: 0.1 10E3/UL
IMM GRANULOCYTES NFR BLD: 0 %
IMM GRANULOCYTES NFR BLD: 1 %
KETONES UR STRIP-MCNC: ABNORMAL MG/DL
KETONES UR STRIP-MCNC: NEGATIVE MG/DL
KETONES UR STRIP-MCNC: NEGATIVE MG/DL
LEUKOCYTE ESTERASE UR QL STRIP: ABNORMAL
LEUKOCYTE ESTERASE UR QL STRIP: NEGATIVE
LEUKOCYTE ESTERASE UR QL STRIP: NEGATIVE
LYMPHOCYTES # BLD AUTO: 1.6 10E3/UL (ref 0.8–5.3)
LYMPHOCYTES # BLD AUTO: 2.5 10E3/UL (ref 0.8–5.3)
LYMPHOCYTES NFR BLD AUTO: 19 %
LYMPHOCYTES NFR BLD AUTO: 33 %
M TB IFN-G BLD-IMP: NEGATIVE
M TB IFN-G CD4+ BCKGRND COR BLD-ACNC: 9.97 IU/ML
MCH RBC QN AUTO: 31.4 PG (ref 26.5–33)
MCH RBC QN AUTO: 31.6 PG (ref 26.5–33)
MCH RBC QN AUTO: 31.8 PG (ref 26.5–33)
MCH RBC QN AUTO: 32.7 PG (ref 26.5–33)
MCHC RBC AUTO-ENTMCNC: 31.8 G/DL (ref 31.5–36.5)
MCHC RBC AUTO-ENTMCNC: 32.6 G/DL (ref 31.5–36.5)
MCHC RBC AUTO-ENTMCNC: 33.1 G/DL (ref 31.5–36.5)
MCHC RBC AUTO-ENTMCNC: 33.6 G/DL (ref 31.5–36.5)
MCV RBC AUTO: 103 FL (ref 78–100)
MCV RBC AUTO: 95 FL (ref 78–100)
MCV RBC AUTO: 95 FL (ref 78–100)
MCV RBC AUTO: 97 FL (ref 78–100)
MITOGEN IGNF BCKGRD COR BLD-ACNC: 0 IU/ML
MITOGEN IGNF BCKGRD COR BLD-ACNC: 0 IU/ML
MONOCYTES # BLD AUTO: 0.7 10E3/UL (ref 0–1.3)
MONOCYTES # BLD AUTO: 0.8 10E3/UL (ref 0–1.3)
MONOCYTES NFR BLD AUTO: 10 %
MONOCYTES NFR BLD AUTO: 9 %
MUCOUS THREADS #/AREA URNS LPF: PRESENT /LPF
MUCOUS THREADS #/AREA URNS LPF: PRESENT /LPF
NEUTROPHILS # BLD AUTO: 4.1 10E3/UL (ref 1.6–8.3)
NEUTROPHILS # BLD AUTO: 5.7 10E3/UL (ref 1.6–8.3)
NEUTROPHILS NFR BLD AUTO: 54 %
NEUTROPHILS NFR BLD AUTO: 64 %
NITRATE UR QL: NEGATIVE
NRBC # BLD AUTO: 0 10E3/UL
NRBC # BLD AUTO: 0 10E3/UL
NRBC BLD AUTO-RTO: 0 /100
NRBC BLD AUTO-RTO: 0 /100
PH UR STRIP: 5.5 [PH] (ref 5–7)
PH UR STRIP: 6 [PH] (ref 5–7)
PH UR STRIP: 7 [PH] (ref 5–7)
PLATELET # BLD AUTO: 256 10E3/UL (ref 150–450)
PLATELET # BLD AUTO: 260 10E3/UL (ref 150–450)
PLATELET # BLD AUTO: 295 10E3/UL (ref 150–450)
PLATELET # BLD AUTO: 419 10E3/UL (ref 150–450)
POTASSIUM BLD-SCNC: 4.9 MMOL/L (ref 3.4–5.3)
POTASSIUM SERPL-SCNC: 4.4 MMOL/L (ref 3.4–5.3)
POTASSIUM SERPL-SCNC: 4.5 MMOL/L (ref 3.4–5.3)
POTASSIUM SERPL-SCNC: 4.6 MMOL/L (ref 3.4–5.3)
QUANTIFERON MITOGEN: 10 IU/ML
QUANTIFERON NIL TUBE: 0.03 IU/ML
QUANTIFERON TB1 TUBE: 0.03 IU/ML
QUANTIFERON TB2 TUBE: 0.03
RBC # BLD AUTO: 3.61 10E6/UL (ref 3.8–5.2)
RBC # BLD AUTO: 3.95 10E6/UL (ref 3.8–5.2)
RBC # BLD AUTO: 4.2 10E6/UL (ref 3.8–5.2)
RBC # BLD AUTO: 4.22 10E6/UL (ref 3.8–5.2)
RBC URINE: 1 /HPF
RBC URINE: 2 /HPF
RBC URINE: 4 /HPF
SODIUM SERPL-SCNC: 131 MMOL/L (ref 136–145)
SODIUM SERPL-SCNC: 131 MMOL/L (ref 136–145)
SODIUM SERPL-SCNC: 132 MMOL/L (ref 136–145)
SODIUM SERPL-SCNC: 133 MMOL/L (ref 133–144)
SP GR UR STRIP: 1.01 (ref 1–1.03)
SP GR UR STRIP: 1.02 (ref 1–1.03)
SP GR UR STRIP: 1.02 (ref 1–1.03)
SQUAMOUS EPITHELIAL: 1 /HPF
SQUAMOUS EPITHELIAL: 3 /HPF
SQUAMOUS EPITHELIAL: <1 /HPF
URATE SERPL-MCNC: 3.4 MG/DL (ref 2.4–5.7)
UROBILINOGEN UR STRIP-MCNC: NORMAL MG/DL
WBC # BLD AUTO: 7.4 10E3/UL (ref 4–11)
WBC # BLD AUTO: 7.6 10E3/UL (ref 4–11)
WBC # BLD AUTO: 8.7 10E3/UL (ref 4–11)
WBC # BLD AUTO: 9.1 10E3/UL (ref 4–11)
WBC URINE: 1 /HPF
WBC URINE: 2 /HPF
WBC URINE: 22 /HPF

## 2023-01-01 PROCEDURE — 85025 COMPLETE CBC W/AUTO DIFF WBC: CPT | Performed by: EMERGENCY MEDICINE

## 2023-01-01 PROCEDURE — 99305 1ST NF CARE MODERATE MDM 35: CPT | Performed by: INTERNAL MEDICINE

## 2023-01-01 PROCEDURE — G0378 HOSPITAL OBSERVATION PER HR: HCPCS

## 2023-01-01 PROCEDURE — 99285 EMERGENCY DEPT VISIT HI MDM: CPT

## 2023-01-01 PROCEDURE — 99232 SBSQ HOSP IP/OBS MODERATE 35: CPT | Performed by: PHYSICIAN ASSISTANT

## 2023-01-01 PROCEDURE — 99310 SBSQ NF CARE HIGH MDM 45: CPT | Mod: CS | Performed by: NURSE PRACTITIONER

## 2023-01-01 PROCEDURE — 250N000013 HC RX MED GY IP 250 OP 250 PS 637: Performed by: EMERGENCY MEDICINE

## 2023-01-01 PROCEDURE — 250N000013 HC RX MED GY IP 250 OP 250 PS 637: Performed by: HOSPITALIST

## 2023-01-01 PROCEDURE — 97530 THERAPEUTIC ACTIVITIES: CPT | Mod: GP

## 2023-01-01 PROCEDURE — 73562 X-RAY EXAM OF KNEE 3: CPT | Mod: LT

## 2023-01-01 PROCEDURE — 250N000013 HC RX MED GY IP 250 OP 250 PS 637: Performed by: PHYSICIAN ASSISTANT

## 2023-01-01 PROCEDURE — 82310 ASSAY OF CALCIUM: CPT | Performed by: EMERGENCY MEDICINE

## 2023-01-01 PROCEDURE — 96374 THER/PROPH/DIAG INJ IV PUSH: CPT

## 2023-01-01 PROCEDURE — 99239 HOSP IP/OBS DSCHRG MGMT >30: CPT | Performed by: INTERNAL MEDICINE

## 2023-01-01 PROCEDURE — 81001 URINALYSIS AUTO W/SCOPE: CPT | Performed by: INTERNAL MEDICINE

## 2023-01-01 PROCEDURE — 36415 COLL VENOUS BLD VENIPUNCTURE: CPT | Performed by: STUDENT IN AN ORGANIZED HEALTH CARE EDUCATION/TRAINING PROGRAM

## 2023-01-01 PROCEDURE — 99231 SBSQ HOSP IP/OBS SF/LOW 25: CPT | Performed by: INTERNAL MEDICINE

## 2023-01-01 PROCEDURE — 86140 C-REACTIVE PROTEIN: CPT | Performed by: STUDENT IN AN ORGANIZED HEALTH CARE EDUCATION/TRAINING PROGRAM

## 2023-01-01 PROCEDURE — 81001 URINALYSIS AUTO W/SCOPE: CPT | Performed by: EMERGENCY MEDICINE

## 2023-01-01 PROCEDURE — 120N000001 HC R&B MED SURG/OB

## 2023-01-01 PROCEDURE — 82310 ASSAY OF CALCIUM: CPT | Performed by: NURSE PRACTITIONER

## 2023-01-01 PROCEDURE — 99233 SBSQ HOSP IP/OBS HIGH 50: CPT | Performed by: INTERNAL MEDICINE

## 2023-01-01 PROCEDURE — 250N000012 HC RX MED GY IP 250 OP 636 PS 637: Performed by: INTERNAL MEDICINE

## 2023-01-01 PROCEDURE — 99232 SBSQ HOSP IP/OBS MODERATE 35: CPT | Performed by: INTERNAL MEDICINE

## 2023-01-01 PROCEDURE — 87086 URINE CULTURE/COLONY COUNT: CPT | Performed by: INTERNAL MEDICINE

## 2023-01-01 PROCEDURE — 250N000012 HC RX MED GY IP 250 OP 636 PS 637

## 2023-01-01 PROCEDURE — 36415 COLL VENOUS BLD VENIPUNCTURE: CPT | Performed by: NURSE PRACTITIONER

## 2023-01-01 PROCEDURE — 36415 COLL VENOUS BLD VENIPUNCTURE: CPT | Performed by: INTERNAL MEDICINE

## 2023-01-01 PROCEDURE — P9604 ONE-WAY ALLOW PRORATED TRIP: HCPCS | Performed by: NURSE PRACTITIONER

## 2023-01-01 PROCEDURE — 250N000011 HC RX IP 250 OP 636: Performed by: INTERNAL MEDICINE

## 2023-01-01 PROCEDURE — 250N000013 HC RX MED GY IP 250 OP 250 PS 637: Performed by: STUDENT IN AN ORGANIZED HEALTH CARE EDUCATION/TRAINING PROGRAM

## 2023-01-01 PROCEDURE — 97116 GAIT TRAINING THERAPY: CPT | Mod: GP

## 2023-01-01 PROCEDURE — 96376 TX/PRO/DX INJ SAME DRUG ADON: CPT

## 2023-01-01 PROCEDURE — 36415 COLL VENOUS BLD VENIPUNCTURE: CPT | Performed by: EMERGENCY MEDICINE

## 2023-01-01 PROCEDURE — 99231 SBSQ HOSP IP/OBS SF/LOW 25: CPT

## 2023-01-01 PROCEDURE — 250N000013 HC RX MED GY IP 250 OP 250 PS 637: Performed by: INTERNAL MEDICINE

## 2023-01-01 PROCEDURE — 97161 PT EVAL LOW COMPLEX 20 MIN: CPT | Mod: GP

## 2023-01-01 PROCEDURE — 99309 SBSQ NF CARE MODERATE MDM 30: CPT | Performed by: NURSE PRACTITIONER

## 2023-01-01 PROCEDURE — 73590 X-RAY EXAM OF LOWER LEG: CPT | Mod: LT

## 2023-01-01 PROCEDURE — 86481 TB AG RESPONSE T-CELL SUSP: CPT | Performed by: NURSE PRACTITIONER

## 2023-01-01 PROCEDURE — 92526 ORAL FUNCTION THERAPY: CPT | Mod: GN | Performed by: SPEECH-LANGUAGE PATHOLOGIST

## 2023-01-01 PROCEDURE — 80048 BASIC METABOLIC PNL TOTAL CA: CPT | Performed by: EMERGENCY MEDICINE

## 2023-01-01 PROCEDURE — 84550 ASSAY OF BLOOD/URIC ACID: CPT | Performed by: EMERGENCY MEDICINE

## 2023-01-01 PROCEDURE — 99221 1ST HOSP IP/OBS SF/LOW 40: CPT | Performed by: HOSPITALIST

## 2023-01-01 PROCEDURE — 97116 GAIT TRAINING THERAPY: CPT | Mod: GP | Performed by: PHYSICAL THERAPY ASSISTANT

## 2023-01-01 PROCEDURE — 85027 COMPLETE CBC AUTOMATED: CPT | Performed by: INTERNAL MEDICINE

## 2023-01-01 PROCEDURE — 72192 CT PELVIS W/O DYE: CPT

## 2023-01-01 PROCEDURE — 97165 OT EVAL LOW COMPLEX 30 MIN: CPT | Mod: GO

## 2023-01-01 PROCEDURE — 99285 EMERGENCY DEPT VISIT HI MDM: CPT | Mod: 25

## 2023-01-01 PROCEDURE — 99238 HOSP IP/OBS DSCHRG MGMT 30/<: CPT | Performed by: INTERNAL MEDICINE

## 2023-01-01 PROCEDURE — 82947 ASSAY GLUCOSE BLOOD QUANT: CPT | Performed by: INTERNAL MEDICINE

## 2023-01-01 PROCEDURE — 85027 COMPLETE CBC AUTOMATED: CPT | Performed by: NURSE PRACTITIONER

## 2023-01-01 PROCEDURE — 97530 THERAPEUTIC ACTIVITIES: CPT | Mod: GP | Performed by: PHYSICAL THERAPY ASSISTANT

## 2023-01-01 PROCEDURE — 99316 NF DSCHRG MGMT 30 MIN+: CPT | Performed by: NURSE PRACTITIONER

## 2023-01-01 PROCEDURE — 71045 X-RAY EXAM CHEST 1 VIEW: CPT

## 2023-01-01 PROCEDURE — 80048 BASIC METABOLIC PNL TOTAL CA: CPT | Performed by: INTERNAL MEDICINE

## 2023-01-01 PROCEDURE — 81001 URINALYSIS AUTO W/SCOPE: CPT | Mod: ORL | Performed by: FAMILY MEDICINE

## 2023-01-01 PROCEDURE — 99223 1ST HOSP IP/OBS HIGH 75: CPT | Mod: AI | Performed by: STUDENT IN AN ORGANIZED HEALTH CARE EDUCATION/TRAINING PROGRAM

## 2023-01-01 PROCEDURE — 86140 C-REACTIVE PROTEIN: CPT | Performed by: EMERGENCY MEDICINE

## 2023-01-01 PROCEDURE — 99304 1ST NF CARE SF/LOW MDM 25: CPT | Performed by: PHYSICIAN ASSISTANT

## 2023-01-01 PROCEDURE — 99207 PR NO BILLABLE SERVICE THIS VISIT: CPT | Performed by: INTERNAL MEDICINE

## 2023-01-01 PROCEDURE — 97116 GAIT TRAINING THERAPY: CPT | Mod: GP | Performed by: PHYSICAL THERAPIST

## 2023-01-01 PROCEDURE — 72100 X-RAY EXAM L-S SPINE 2/3 VWS: CPT

## 2023-01-01 PROCEDURE — 97110 THERAPEUTIC EXERCISES: CPT | Mod: GP | Performed by: PHYSICAL THERAPIST

## 2023-01-01 PROCEDURE — 999N000128 HC STATISTIC PERIPHERAL IV START W/O US GUIDANCE

## 2023-01-01 PROCEDURE — 73522 X-RAY EXAM HIPS BI 3-4 VIEWS: CPT

## 2023-01-01 PROCEDURE — 70450 CT HEAD/BRAIN W/O DYE: CPT

## 2023-01-01 PROCEDURE — 97530 THERAPEUTIC ACTIVITIES: CPT | Mod: GO

## 2023-01-01 RX ORDER — ONDANSETRON 4 MG/1
4 TABLET, ORALLY DISINTEGRATING ORAL EVERY 6 HOURS PRN
Status: DISCONTINUED | OUTPATIENT
Start: 2023-01-01 | End: 2023-01-01 | Stop reason: HOSPADM

## 2023-01-01 RX ORDER — ACETAMINOPHEN 325 MG/1
975 TABLET ORAL EVERY 8 HOURS PRN
Status: ON HOLD | DISCHARGE
Start: 2023-01-01 | End: 2023-01-01

## 2023-01-01 RX ORDER — ACETAMINOPHEN 650 MG/1
650 SUPPOSITORY RECTAL EVERY 6 HOURS PRN
Status: DISCONTINUED | OUTPATIENT
Start: 2023-01-01 | End: 2023-01-01 | Stop reason: HOSPADM

## 2023-01-01 RX ORDER — METOPROLOL TARTRATE 25 MG/1
25 TABLET, FILM COATED ORAL 2 TIMES DAILY
Status: DISCONTINUED | OUTPATIENT
Start: 2023-01-01 | End: 2023-01-01 | Stop reason: HOSPADM

## 2023-01-01 RX ORDER — LEVOTHYROXINE SODIUM 50 UG/1
50 TABLET ORAL DAILY
Status: DISCONTINUED | OUTPATIENT
Start: 2023-01-01 | End: 2023-01-01 | Stop reason: HOSPADM

## 2023-01-01 RX ORDER — NALOXONE HYDROCHLORIDE 0.4 MG/ML
0.2 INJECTION, SOLUTION INTRAMUSCULAR; INTRAVENOUS; SUBCUTANEOUS
Status: DISCONTINUED | OUTPATIENT
Start: 2023-01-01 | End: 2023-01-01 | Stop reason: HOSPADM

## 2023-01-01 RX ORDER — ACETAMINOPHEN 325 MG/1
650 TABLET ORAL EVERY 6 HOURS PRN
Status: DISCONTINUED | OUTPATIENT
Start: 2023-01-01 | End: 2023-01-01 | Stop reason: HOSPADM

## 2023-01-01 RX ORDER — OXYCODONE HYDROCHLORIDE 5 MG/1
5 TABLET ORAL EVERY 4 HOURS PRN
Status: DISCONTINUED | OUTPATIENT
Start: 2023-01-01 | End: 2023-01-01 | Stop reason: HOSPADM

## 2023-01-01 RX ORDER — NALOXONE HYDROCHLORIDE 0.4 MG/ML
0.4 INJECTION, SOLUTION INTRAMUSCULAR; INTRAVENOUS; SUBCUTANEOUS
Status: DISCONTINUED | OUTPATIENT
Start: 2023-01-01 | End: 2023-01-01 | Stop reason: HOSPADM

## 2023-01-01 RX ORDER — VITAMIN B COMPLEX
1000 TABLET ORAL DAILY
Status: DISCONTINUED | OUTPATIENT
Start: 2023-01-01 | End: 2023-01-01 | Stop reason: HOSPADM

## 2023-01-01 RX ORDER — TRAMADOL HYDROCHLORIDE 50 MG/1
50 TABLET ORAL ONCE
Status: COMPLETED | OUTPATIENT
Start: 2023-01-01 | End: 2023-01-01

## 2023-01-01 RX ORDER — ACETAMINOPHEN 325 MG/1
975 TABLET ORAL EVERY 8 HOURS
Status: DISCONTINUED | OUTPATIENT
Start: 2023-01-01 | End: 2023-01-01 | Stop reason: HOSPADM

## 2023-01-01 RX ORDER — MIRABEGRON 50 MG/1
50 TABLET, EXTENDED RELEASE ORAL DAILY
Status: DISCONTINUED | OUTPATIENT
Start: 2023-01-01 | End: 2023-01-01 | Stop reason: HOSPADM

## 2023-01-01 RX ORDER — LEVOTHYROXINE SODIUM 50 UG/1
50 TABLET ORAL
Status: DISCONTINUED | OUTPATIENT
Start: 2023-01-01 | End: 2023-01-01 | Stop reason: HOSPADM

## 2023-01-01 RX ORDER — LIDOCAINE 4 G/G
2 PATCH TOPICAL EVERY 24 HOURS
DISCHARGE
Start: 2023-01-01

## 2023-01-01 RX ORDER — ASCORBIC ACID 500 MG
500 TABLET ORAL DAILY
Status: DISCONTINUED | OUTPATIENT
Start: 2023-01-01 | End: 2023-01-01 | Stop reason: HOSPADM

## 2023-01-01 RX ORDER — LANOLIN ALCOHOL/MO/W.PET/CERES
3 CREAM (GRAM) TOPICAL
Status: DISCONTINUED | OUTPATIENT
Start: 2023-01-01 | End: 2023-01-01 | Stop reason: HOSPADM

## 2023-01-01 RX ORDER — ACETAMINOPHEN 500 MG
1000 TABLET ORAL 3 TIMES DAILY
Status: DISCONTINUED | OUTPATIENT
Start: 2023-01-01 | End: 2023-01-01 | Stop reason: HOSPADM

## 2023-01-01 RX ORDER — ESCITALOPRAM OXALATE 10 MG/1
10 TABLET ORAL DAILY
COMMUNITY

## 2023-01-01 RX ORDER — LIDOCAINE 4 G/G
2 PATCH TOPICAL
Status: DISCONTINUED | OUTPATIENT
Start: 2023-01-01 | End: 2023-01-01 | Stop reason: HOSPADM

## 2023-01-01 RX ORDER — ESCITALOPRAM OXALATE 10 MG/1
10 TABLET ORAL DAILY
Status: DISCONTINUED | OUTPATIENT
Start: 2023-01-01 | End: 2023-01-01 | Stop reason: HOSPADM

## 2023-01-01 RX ORDER — ALBUTEROL SULFATE 90 UG/1
2 AEROSOL, METERED RESPIRATORY (INHALATION) EVERY 4 HOURS PRN
Status: DISCONTINUED | OUTPATIENT
Start: 2023-01-01 | End: 2023-01-01 | Stop reason: HOSPADM

## 2023-01-01 RX ORDER — OXYCODONE HYDROCHLORIDE 5 MG/1
2.5 TABLET ORAL EVERY 4 HOURS PRN
Qty: 10 TABLET | Refills: 0 | Status: SHIPPED | OUTPATIENT
Start: 2023-01-01

## 2023-01-01 RX ORDER — AMOXICILLIN 250 MG
2 CAPSULE ORAL 2 TIMES DAILY PRN
Status: DISCONTINUED | OUTPATIENT
Start: 2023-01-01 | End: 2023-01-01 | Stop reason: HOSPADM

## 2023-01-01 RX ORDER — IBUPROFEN 200 MG
1 CAPSULE ORAL 2 TIMES DAILY
Status: DISCONTINUED | OUTPATIENT
Start: 2023-01-01 | End: 2023-01-01

## 2023-01-01 RX ORDER — ONDANSETRON 2 MG/ML
4 INJECTION INTRAMUSCULAR; INTRAVENOUS EVERY 6 HOURS PRN
Status: DISCONTINUED | OUTPATIENT
Start: 2023-01-01 | End: 2023-01-01 | Stop reason: HOSPADM

## 2023-01-01 RX ORDER — MULTIVITAMIN,THERAPEUTIC
1 TABLET ORAL DAILY
Status: DISCONTINUED | OUTPATIENT
Start: 2023-01-01 | End: 2023-01-01 | Stop reason: HOSPADM

## 2023-01-01 RX ORDER — PREDNISONE 20 MG/1
40 TABLET ORAL DAILY
Status: DISCONTINUED | OUTPATIENT
Start: 2023-01-01 | End: 2023-01-01

## 2023-01-01 RX ORDER — PREDNISONE 10 MG/1
TABLET ORAL
Qty: 20 TABLET | Refills: 0 | DISCHARGE
Start: 2023-01-01 | End: 2023-01-01

## 2023-01-01 RX ORDER — MULTIPLE VITAMINS W/ MINERALS TAB 9MG-400MCG
1 TAB ORAL DAILY
Status: DISCONTINUED | OUTPATIENT
Start: 2023-01-01 | End: 2023-01-01 | Stop reason: HOSPADM

## 2023-01-01 RX ORDER — AMOXICILLIN 250 MG
1 CAPSULE ORAL 2 TIMES DAILY PRN
Status: DISCONTINUED | OUTPATIENT
Start: 2023-01-01 | End: 2023-01-01 | Stop reason: HOSPADM

## 2023-01-01 RX ORDER — OXYCODONE HYDROCHLORIDE 5 MG/1
2.5 TABLET ORAL EVERY 6 HOURS PRN
Qty: 10 TABLET | Refills: 0 | Status: SHIPPED | OUTPATIENT
Start: 2023-01-01 | End: 2023-01-01

## 2023-01-01 RX ORDER — ACETAMINOPHEN 500 MG
1000 TABLET ORAL 3 TIMES DAILY
COMMUNITY

## 2023-01-01 RX ORDER — CEFTRIAXONE 1 G/1
1 INJECTION, POWDER, FOR SOLUTION INTRAMUSCULAR; INTRAVENOUS EVERY 24 HOURS
Status: DISCONTINUED | OUTPATIENT
Start: 2023-01-01 | End: 2023-01-01

## 2023-01-01 RX ORDER — MIRABEGRON 25 MG/1
50 TABLET, FILM COATED, EXTENDED RELEASE ORAL DAILY
Status: DISCONTINUED | OUTPATIENT
Start: 2023-01-01 | End: 2023-01-01 | Stop reason: HOSPADM

## 2023-01-01 RX ORDER — BENZONATATE 100 MG/1
100 CAPSULE ORAL 3 TIMES DAILY PRN
DISCHARGE
Start: 2023-01-01 | End: 2023-01-01

## 2023-01-01 RX ADMIN — APIXABAN 5 MG: 5 TABLET, FILM COATED ORAL at 20:21

## 2023-01-01 RX ADMIN — DICLOFENAC SODIUM 4 G: 10 GEL TOPICAL at 12:26

## 2023-01-01 RX ADMIN — APIXABAN 5 MG: 5 TABLET, FILM COATED ORAL at 20:13

## 2023-01-01 RX ADMIN — ACETAMINOPHEN 975 MG: 325 TABLET, FILM COATED ORAL at 14:52

## 2023-01-01 RX ADMIN — APIXABAN 5 MG: 5 TABLET, FILM COATED ORAL at 19:31

## 2023-01-01 RX ADMIN — APIXABAN 5 MG: 5 TABLET, FILM COATED ORAL at 20:16

## 2023-01-01 RX ADMIN — ESCITALOPRAM OXALATE 10 MG: 10 TABLET ORAL at 08:57

## 2023-01-01 RX ADMIN — METOPROLOL TARTRATE 25 MG: 25 TABLET, FILM COATED ORAL at 08:57

## 2023-01-01 RX ADMIN — ESCITALOPRAM OXALATE 10 MG: 10 TABLET ORAL at 08:12

## 2023-01-01 RX ADMIN — APIXABAN 5 MG: 5 TABLET, FILM COATED ORAL at 20:15

## 2023-01-01 RX ADMIN — METOPROLOL TARTRATE 25 MG: 25 TABLET, FILM COATED ORAL at 08:53

## 2023-01-01 RX ADMIN — ACETAMINOPHEN 975 MG: 325 TABLET, FILM COATED ORAL at 16:00

## 2023-01-01 RX ADMIN — APIXABAN 5 MG: 5 TABLET, FILM COATED ORAL at 20:43

## 2023-01-01 RX ADMIN — MIRABEGRON 50 MG: 50 TABLET, FILM COATED, EXTENDED RELEASE ORAL at 09:09

## 2023-01-01 RX ADMIN — DICLOFENAC SODIUM 4 G: 10 GEL TOPICAL at 16:45

## 2023-01-01 RX ADMIN — DICLOFENAC SODIUM 4 G: 10 GEL TOPICAL at 08:50

## 2023-01-01 RX ADMIN — LEVOTHYROXINE SODIUM 50 MCG: 50 TABLET ORAL at 08:12

## 2023-01-01 RX ADMIN — ACETAMINOPHEN 975 MG: 325 TABLET, FILM COATED ORAL at 08:46

## 2023-01-01 RX ADMIN — APIXABAN 5 MG: 5 TABLET, FILM COATED ORAL at 20:45

## 2023-01-01 RX ADMIN — ACETAMINOPHEN 975 MG: 325 TABLET, FILM COATED ORAL at 16:34

## 2023-01-01 RX ADMIN — METOPROLOL TARTRATE 25 MG: 25 TABLET, FILM COATED ORAL at 08:11

## 2023-01-01 RX ADMIN — ACETAMINOPHEN 1000 MG: 500 TABLET ORAL at 13:58

## 2023-01-01 RX ADMIN — MULTIPLE VITAMINS W/ MINERALS TAB 1 TABLET: TAB at 09:21

## 2023-01-01 RX ADMIN — ACETAMINOPHEN 975 MG: 325 TABLET, FILM COATED ORAL at 20:43

## 2023-01-01 RX ADMIN — APIXABAN 5 MG: 5 TABLET, FILM COATED ORAL at 08:53

## 2023-01-01 RX ADMIN — LEVOTHYROXINE SODIUM 50 MCG: 50 TABLET ORAL at 06:00

## 2023-01-01 RX ADMIN — PREDNISONE 40 MG: 20 TABLET ORAL at 08:46

## 2023-01-01 RX ADMIN — METOPROLOL TARTRATE 12.5 MG: 25 TABLET, FILM COATED ORAL at 08:30

## 2023-01-01 RX ADMIN — METOPROLOL TARTRATE 25 MG: 25 TABLET, FILM COATED ORAL at 08:47

## 2023-01-01 RX ADMIN — MIRABEGRON 50 MG: 50 TABLET, FILM COATED, EXTENDED RELEASE ORAL at 08:53

## 2023-01-01 RX ADMIN — ACETAMINOPHEN 1000 MG: 500 TABLET ORAL at 08:53

## 2023-01-01 RX ADMIN — DICLOFENAC SODIUM 4 G: 10 GEL TOPICAL at 12:00

## 2023-01-01 RX ADMIN — ACETAMINOPHEN 975 MG: 325 TABLET, FILM COATED ORAL at 20:32

## 2023-01-01 RX ADMIN — ACETAMINOPHEN 975 MG: 325 TABLET, FILM COATED ORAL at 17:00

## 2023-01-01 RX ADMIN — ACETAMINOPHEN 1000 MG: 500 TABLET ORAL at 08:20

## 2023-01-01 RX ADMIN — APIXABAN 5 MG: 5 TABLET, FILM COATED ORAL at 08:30

## 2023-01-01 RX ADMIN — PREDNISONE 40 MG: 20 TABLET ORAL at 08:35

## 2023-01-01 RX ADMIN — ESCITALOPRAM OXALATE 10 MG: 10 TABLET ORAL at 08:46

## 2023-01-01 RX ADMIN — MIRABEGRON 50 MG: 25 TABLET, FILM COATED, EXTENDED RELEASE ORAL at 08:43

## 2023-01-01 RX ADMIN — ACETAMINOPHEN 1000 MG: 500 TABLET ORAL at 20:15

## 2023-01-01 RX ADMIN — LEVOTHYROXINE SODIUM 50 MCG: 50 TABLET ORAL at 08:43

## 2023-01-01 RX ADMIN — METOPROLOL TARTRATE 12.5 MG: 25 TABLET, FILM COATED ORAL at 09:48

## 2023-01-01 RX ADMIN — APIXABAN 5 MG: 5 TABLET, FILM COATED ORAL at 09:09

## 2023-01-01 RX ADMIN — ACETAMINOPHEN 975 MG: 325 TABLET, FILM COATED ORAL at 16:42

## 2023-01-01 RX ADMIN — ACETAMINOPHEN 975 MG: 325 TABLET, FILM COATED ORAL at 08:00

## 2023-01-01 RX ADMIN — METOPROLOL TARTRATE 25 MG: 25 TABLET, FILM COATED ORAL at 19:31

## 2023-01-01 RX ADMIN — DICLOFENAC SODIUM 4 G: 10 GEL TOPICAL at 09:19

## 2023-01-01 RX ADMIN — METOPROLOL TARTRATE 25 MG: 25 TABLET, FILM COATED ORAL at 08:00

## 2023-01-01 RX ADMIN — ACETAMINOPHEN 1000 MG: 500 TABLET ORAL at 14:02

## 2023-01-01 RX ADMIN — MIRABEGRON 50 MG: 25 TABLET, FILM COATED, EXTENDED RELEASE ORAL at 08:00

## 2023-01-01 RX ADMIN — Medication 600 MG: at 08:21

## 2023-01-01 RX ADMIN — APIXABAN 5 MG: 5 TABLET, FILM COATED ORAL at 20:28

## 2023-01-01 RX ADMIN — OXYCODONE HYDROCHLORIDE 2.5 MG: 5 TABLET ORAL at 09:12

## 2023-01-01 RX ADMIN — LEVOTHYROXINE SODIUM 50 MCG: 50 TABLET ORAL at 08:47

## 2023-01-01 RX ADMIN — APIXABAN 5 MG: 5 TABLET, FILM COATED ORAL at 20:10

## 2023-01-01 RX ADMIN — ACETAMINOPHEN 975 MG: 325 TABLET, FILM COATED ORAL at 08:57

## 2023-01-01 RX ADMIN — METOPROLOL TARTRATE 12.5 MG: 25 TABLET, FILM COATED ORAL at 20:32

## 2023-01-01 RX ADMIN — DICLOFENAC SODIUM 4 G: 10 GEL TOPICAL at 11:44

## 2023-01-01 RX ADMIN — MIRABEGRON 50 MG: 25 TABLET, FILM COATED, EXTENDED RELEASE ORAL at 08:35

## 2023-01-01 RX ADMIN — MIRABEGRON 50 MG: 25 TABLET, FILM COATED, EXTENDED RELEASE ORAL at 08:13

## 2023-01-01 RX ADMIN — OXYCODONE HYDROCHLORIDE AND ACETAMINOPHEN 500 MG: 500 TABLET ORAL at 08:53

## 2023-01-01 RX ADMIN — PREDNISONE 40 MG: 20 TABLET ORAL at 08:13

## 2023-01-01 RX ADMIN — LIDOCAINE 2 PATCH: 560 PATCH PERCUTANEOUS; TOPICAL; TRANSDERMAL at 08:46

## 2023-01-01 RX ADMIN — MIRABEGRON 50 MG: 25 TABLET, FILM COATED, EXTENDED RELEASE ORAL at 08:46

## 2023-01-01 RX ADMIN — APIXABAN 5 MG: 5 TABLET, FILM COATED ORAL at 08:47

## 2023-01-01 RX ADMIN — DICLOFENAC SODIUM 4 G: 10 GEL TOPICAL at 19:33

## 2023-01-01 RX ADMIN — METOPROLOL TARTRATE 25 MG: 25 TABLET, FILM COATED ORAL at 08:20

## 2023-01-01 RX ADMIN — THERA TABS 1 TABLET: TAB at 08:21

## 2023-01-01 RX ADMIN — APIXABAN 5 MG: 5 TABLET, FILM COATED ORAL at 08:20

## 2023-01-01 RX ADMIN — LEVOTHYROXINE SODIUM 50 MCG: 50 TABLET ORAL at 08:00

## 2023-01-01 RX ADMIN — Medication 600 MG: at 08:53

## 2023-01-01 RX ADMIN — CEFTRIAXONE SODIUM 1 G: 1 INJECTION, POWDER, FOR SOLUTION INTRAMUSCULAR; INTRAVENOUS at 17:54

## 2023-01-01 RX ADMIN — APIXABAN 5 MG: 5 TABLET, FILM COATED ORAL at 20:33

## 2023-01-01 RX ADMIN — ESCITALOPRAM OXALATE 10 MG: 10 TABLET ORAL at 08:00

## 2023-01-01 RX ADMIN — ACETAMINOPHEN 975 MG: 325 TABLET, FILM COATED ORAL at 21:30

## 2023-01-01 RX ADMIN — ACETAMINOPHEN 975 MG: 325 TABLET, FILM COATED ORAL at 08:09

## 2023-01-01 RX ADMIN — ACETAMINOPHEN 975 MG: 325 TABLET, FILM COATED ORAL at 04:48

## 2023-01-01 RX ADMIN — DICLOFENAC SODIUM 4 G: 10 GEL TOPICAL at 13:18

## 2023-01-01 RX ADMIN — MIRABEGRON 50 MG: 50 TABLET, FILM COATED, EXTENDED RELEASE ORAL at 09:22

## 2023-01-01 RX ADMIN — Medication 1000 UNITS: at 08:20

## 2023-01-01 RX ADMIN — Medication 600 MG: at 20:15

## 2023-01-01 RX ADMIN — APIXABAN 5 MG: 5 TABLET, FILM COATED ORAL at 08:43

## 2023-01-01 RX ADMIN — DICLOFENAC SODIUM 4 G: 10 GEL TOPICAL at 20:17

## 2023-01-01 RX ADMIN — DICLOFENAC SODIUM 4 G: 10 GEL TOPICAL at 20:11

## 2023-01-01 RX ADMIN — ACETAMINOPHEN 1000 MG: 500 TABLET ORAL at 20:21

## 2023-01-01 RX ADMIN — METOPROLOL TARTRATE 25 MG: 25 TABLET, FILM COATED ORAL at 08:43

## 2023-01-01 RX ADMIN — ESCITALOPRAM OXALATE 10 MG: 10 TABLET ORAL at 08:35

## 2023-01-01 RX ADMIN — LEVOTHYROXINE SODIUM 50 MCG: 50 TABLET ORAL at 08:57

## 2023-01-01 RX ADMIN — ACETAMINOPHEN 975 MG: 325 TABLET, FILM COATED ORAL at 12:16

## 2023-01-01 RX ADMIN — LEVOTHYROXINE SODIUM 50 MCG: 50 TABLET ORAL at 09:48

## 2023-01-01 RX ADMIN — APIXABAN 5 MG: 5 TABLET, FILM COATED ORAL at 08:35

## 2023-01-01 RX ADMIN — ACETAMINOPHEN 975 MG: 325 TABLET, FILM COATED ORAL at 00:59

## 2023-01-01 RX ADMIN — THERA TABS 1 TABLET: TAB at 08:53

## 2023-01-01 RX ADMIN — PREDNISONE 30 MG: 5 TABLET ORAL at 08:00

## 2023-01-01 RX ADMIN — METOPROLOL TARTRATE 12.5 MG: 25 TABLET, FILM COATED ORAL at 20:45

## 2023-01-01 RX ADMIN — LEVOTHYROXINE SODIUM 50 MCG: 50 TABLET ORAL at 08:21

## 2023-01-01 RX ADMIN — MULTIPLE VITAMINS W/ MINERALS TAB 1 TABLET: TAB at 09:09

## 2023-01-01 RX ADMIN — LEVOTHYROXINE SODIUM 50 MCG: 50 TABLET ORAL at 08:30

## 2023-01-01 RX ADMIN — MULTIPLE VITAMINS W/ MINERALS TAB 1 TABLET: TAB at 15:57

## 2023-01-01 RX ADMIN — APIXABAN 5 MG: 5 TABLET, FILM COATED ORAL at 08:00

## 2023-01-01 RX ADMIN — SENNOSIDES AND DOCUSATE SODIUM 2 TABLET: 50; 8.6 TABLET ORAL at 09:34

## 2023-01-01 RX ADMIN — METOPROLOL TARTRATE 12.5 MG: 25 TABLET, FILM COATED ORAL at 09:22

## 2023-01-01 RX ADMIN — ACETAMINOPHEN 650 MG: 325 TABLET ORAL at 09:12

## 2023-01-01 RX ADMIN — APIXABAN 5 MG: 5 TABLET, FILM COATED ORAL at 08:57

## 2023-01-01 RX ADMIN — METOPROLOL TARTRATE 25 MG: 25 TABLET, FILM COATED ORAL at 20:11

## 2023-01-01 RX ADMIN — ACETAMINOPHEN 975 MG: 325 TABLET, FILM COATED ORAL at 01:10

## 2023-01-01 RX ADMIN — DICLOFENAC SODIUM 4 G: 10 GEL TOPICAL at 08:47

## 2023-01-01 RX ADMIN — ACETAMINOPHEN 975 MG: 325 TABLET, FILM COATED ORAL at 23:54

## 2023-01-01 RX ADMIN — METOPROLOL TARTRATE 25 MG: 25 TABLET, FILM COATED ORAL at 08:35

## 2023-01-01 RX ADMIN — BENZONATATE 100 MG: 100 CAPSULE ORAL at 08:39

## 2023-01-01 RX ADMIN — MIRABEGRON 50 MG: 50 TABLET, FILM COATED, EXTENDED RELEASE ORAL at 08:20

## 2023-01-01 RX ADMIN — LIDOCAINE 2 PATCH: 560 PATCH PERCUTANEOUS; TOPICAL; TRANSDERMAL at 08:00

## 2023-01-01 RX ADMIN — DICLOFENAC SODIUM 4 G: 10 GEL TOPICAL at 11:57

## 2023-01-01 RX ADMIN — DICLOFENAC SODIUM 4 G: 10 GEL TOPICAL at 08:43

## 2023-01-01 RX ADMIN — APIXABAN 5 MG: 5 TABLET, FILM COATED ORAL at 09:22

## 2023-01-01 RX ADMIN — DICLOFENAC SODIUM 4 G: 10 GEL TOPICAL at 16:35

## 2023-01-01 RX ADMIN — MIRABEGRON 50 MG: 50 TABLET, FILM COATED, EXTENDED RELEASE ORAL at 09:48

## 2023-01-01 RX ADMIN — APIXABAN 5 MG: 5 TABLET, FILM COATED ORAL at 09:48

## 2023-01-01 RX ADMIN — Medication 600 MG: at 20:20

## 2023-01-01 RX ADMIN — APIXABAN 5 MG: 5 TABLET, FILM COATED ORAL at 08:08

## 2023-01-01 RX ADMIN — METOPROLOL TARTRATE 25 MG: 25 TABLET, FILM COATED ORAL at 21:31

## 2023-01-01 RX ADMIN — ACETAMINOPHEN 975 MG: 325 TABLET, FILM COATED ORAL at 16:35

## 2023-01-01 RX ADMIN — LEVOTHYROXINE SODIUM 50 MCG: 50 TABLET ORAL at 08:53

## 2023-01-01 RX ADMIN — DICLOFENAC SODIUM 4 G: 10 GEL TOPICAL at 08:23

## 2023-01-01 RX ADMIN — PREDNISONE 40 MG: 20 TABLET ORAL at 15:01

## 2023-01-01 RX ADMIN — DICLOFENAC SODIUM 4 G: 10 GEL TOPICAL at 08:01

## 2023-01-01 RX ADMIN — LEVOTHYROXINE SODIUM 50 MCG: 50 TABLET ORAL at 09:08

## 2023-01-01 RX ADMIN — ESCITALOPRAM OXALATE 10 MG: 10 TABLET ORAL at 08:21

## 2023-01-01 RX ADMIN — TRAMADOL HYDROCHLORIDE 50 MG: 50 TABLET, COATED ORAL at 16:04

## 2023-01-01 RX ADMIN — LIDOCAINE 2 PATCH: 560 PATCH PERCUTANEOUS; TOPICAL; TRANSDERMAL at 11:57

## 2023-01-01 RX ADMIN — Medication 1000 UNITS: at 08:53

## 2023-01-01 RX ADMIN — ACETAMINOPHEN 975 MG: 325 TABLET, FILM COATED ORAL at 08:35

## 2023-01-01 RX ADMIN — ACETAMINOPHEN 975 MG: 325 TABLET, FILM COATED ORAL at 05:45

## 2023-01-01 RX ADMIN — ACETAMINOPHEN 975 MG: 325 TABLET, FILM COATED ORAL at 04:52

## 2023-01-01 RX ADMIN — DICLOFENAC SODIUM 4 G: 10 GEL TOPICAL at 20:28

## 2023-01-01 RX ADMIN — MIRABEGRON 50 MG: 25 TABLET, FILM COATED, EXTENDED RELEASE ORAL at 08:57

## 2023-01-01 RX ADMIN — LEVOTHYROXINE SODIUM 50 MCG: 50 TABLET ORAL at 08:35

## 2023-01-01 RX ADMIN — LIDOCAINE 2 PATCH: 560 PATCH PERCUTANEOUS; TOPICAL; TRANSDERMAL at 08:26

## 2023-01-01 RX ADMIN — METOPROLOL TARTRATE 25 MG: 25 TABLET, FILM COATED ORAL at 20:28

## 2023-01-01 RX ADMIN — LIDOCAINE 2 PATCH: 560 PATCH PERCUTANEOUS; TOPICAL; TRANSDERMAL at 08:34

## 2023-01-01 RX ADMIN — ACETAMINOPHEN 975 MG: 325 TABLET, FILM COATED ORAL at 20:45

## 2023-01-01 RX ADMIN — OXYCODONE HYDROCHLORIDE AND ACETAMINOPHEN 500 MG: 500 TABLET ORAL at 08:20

## 2023-01-01 RX ADMIN — METOPROLOL TARTRATE 12.5 MG: 25 TABLET, FILM COATED ORAL at 20:43

## 2023-01-01 RX ADMIN — METOPROLOL TARTRATE 25 MG: 25 TABLET, FILM COATED ORAL at 20:16

## 2023-01-01 RX ADMIN — CEFTRIAXONE SODIUM 1 G: 1 INJECTION, POWDER, FOR SOLUTION INTRAMUSCULAR; INTRAVENOUS at 17:39

## 2023-01-01 RX ADMIN — MIRABEGRON 50 MG: 50 TABLET, FILM COATED, EXTENDED RELEASE ORAL at 08:30

## 2023-01-01 RX ADMIN — DICLOFENAC SODIUM 4 G: 10 GEL TOPICAL at 16:00

## 2023-01-01 RX ADMIN — METOPROLOL TARTRATE 12.5 MG: 25 TABLET, FILM COATED ORAL at 09:08

## 2023-01-01 RX ADMIN — DICLOFENAC SODIUM 4 G: 10 GEL TOPICAL at 17:00

## 2023-01-01 RX ADMIN — ACETAMINOPHEN 975 MG: 325 TABLET, FILM COATED ORAL at 08:43

## 2023-01-01 RX ADMIN — METOPROLOL TARTRATE 25 MG: 25 TABLET, FILM COATED ORAL at 20:15

## 2023-01-01 RX ADMIN — ACETAMINOPHEN 975 MG: 325 TABLET, FILM COATED ORAL at 08:41

## 2023-01-01 RX ADMIN — METOPROLOL TARTRATE 25 MG: 25 TABLET, FILM COATED ORAL at 20:13

## 2023-01-01 RX ADMIN — ESCITALOPRAM OXALATE 10 MG: 10 TABLET ORAL at 08:53

## 2023-01-01 RX ADMIN — METOPROLOL TARTRATE 25 MG: 25 TABLET, FILM COATED ORAL at 20:20

## 2023-01-01 ASSESSMENT — ACTIVITIES OF DAILY LIVING (ADL)
ADLS_ACUITY_SCORE: 48
ADLS_ACUITY_SCORE: 43
ADLS_ACUITY_SCORE: 47
ADLS_ACUITY_SCORE: 48
ADLS_ACUITY_SCORE: 50
ADLS_ACUITY_SCORE: 47
ADLS_ACUITY_SCORE: 55
ADLS_ACUITY_SCORE: 51
ADLS_ACUITY_SCORE: 43
ADLS_ACUITY_SCORE: 49
ADLS_ACUITY_SCORE: 53
ADLS_ACUITY_SCORE: 48
ADLS_ACUITY_SCORE: 43
ADLS_ACUITY_SCORE: 53
ADLS_ACUITY_SCORE: 53
ADLS_ACUITY_SCORE: 55
ADLS_ACUITY_SCORE: 51
ADLS_ACUITY_SCORE: 48
ADLS_ACUITY_SCORE: 53
ADLS_ACUITY_SCORE: 48
ADLS_ACUITY_SCORE: 48
ADLS_ACUITY_SCORE: 39
ADLS_ACUITY_SCORE: 47
ADLS_ACUITY_SCORE: 53
ADLS_ACUITY_SCORE: 43
DEPENDENT_IADLS:: CLEANING;COOKING;LAUNDRY;SHOPPING;MEAL PREPARATION;MEDICATION MANAGEMENT;MONEY MANAGEMENT;TRANSPORTATION;INCONTINENCE
ADLS_ACUITY_SCORE: 39
ADLS_ACUITY_SCORE: 47
ADLS_ACUITY_SCORE: 43
ADLS_ACUITY_SCORE: 46
ADLS_ACUITY_SCORE: 51
ADLS_ACUITY_SCORE: 48
ADLS_ACUITY_SCORE: 55
ADLS_ACUITY_SCORE: 51
ADLS_ACUITY_SCORE: 47
ADLS_ACUITY_SCORE: 43
ADLS_ACUITY_SCORE: 53
ADLS_ACUITY_SCORE: 47
ADLS_ACUITY_SCORE: 48
ADLS_ACUITY_SCORE: 53
ADLS_ACUITY_SCORE: 46
ADLS_ACUITY_SCORE: 53
ADLS_ACUITY_SCORE: 55
ADLS_ACUITY_SCORE: 53
ADLS_ACUITY_SCORE: 45
ADLS_ACUITY_SCORE: 53
ADLS_ACUITY_SCORE: 55
ADLS_ACUITY_SCORE: 39
ADLS_ACUITY_SCORE: 53
ADLS_ACUITY_SCORE: 43
ADLS_ACUITY_SCORE: 50
ADLS_ACUITY_SCORE: 48
ADLS_ACUITY_SCORE: 43
ADLS_ACUITY_SCORE: 55
ADLS_ACUITY_SCORE: 46
ADLS_ACUITY_SCORE: 48
ADLS_ACUITY_SCORE: 47
ADLS_ACUITY_SCORE: 53
ADLS_ACUITY_SCORE: 48
ADLS_ACUITY_SCORE: 53
DEPENDENT_IADLS:: CLEANING;COOKING;LAUNDRY;SHOPPING;MEDICATION MANAGEMENT;TRANSPORTATION
ADLS_ACUITY_SCORE: 48
ADLS_ACUITY_SCORE: 43
ADLS_ACUITY_SCORE: 53
ADLS_ACUITY_SCORE: 55
ADLS_ACUITY_SCORE: 55
ADLS_ACUITY_SCORE: 39
ADLS_ACUITY_SCORE: 47
ADLS_ACUITY_SCORE: 51
ADLS_ACUITY_SCORE: 39
ADLS_ACUITY_SCORE: 55
ADLS_ACUITY_SCORE: 53
ADLS_ACUITY_SCORE: 53
ADLS_ACUITY_SCORE: 57
ADLS_ACUITY_SCORE: 53
ADLS_ACUITY_SCORE: 55
ADLS_ACUITY_SCORE: 51
ADLS_ACUITY_SCORE: 55
ADLS_ACUITY_SCORE: 53
ADLS_ACUITY_SCORE: 53
ADLS_ACUITY_SCORE: 47
ADLS_ACUITY_SCORE: 47
ADLS_ACUITY_SCORE: 54
ADLS_ACUITY_SCORE: 47
ADLS_ACUITY_SCORE: 47
ADLS_ACUITY_SCORE: 53
ADLS_ACUITY_SCORE: 51
ADLS_ACUITY_SCORE: 55
ADLS_ACUITY_SCORE: 48
ADLS_ACUITY_SCORE: 39
ADLS_ACUITY_SCORE: 47
ADLS_ACUITY_SCORE: 47
ADLS_ACUITY_SCORE: 48
ADLS_ACUITY_SCORE: 39
ADLS_ACUITY_SCORE: 39
ADLS_ACUITY_SCORE: 46
ADLS_ACUITY_SCORE: 53
ADLS_ACUITY_SCORE: 43
ADLS_ACUITY_SCORE: 47
ADLS_ACUITY_SCORE: 47
ADLS_ACUITY_SCORE: 48
ADLS_ACUITY_SCORE: 43
ADLS_ACUITY_SCORE: 47
ADLS_ACUITY_SCORE: 51
ADLS_ACUITY_SCORE: 53
ADLS_ACUITY_SCORE: 48
ADLS_ACUITY_SCORE: 39
ADLS_ACUITY_SCORE: 47
ADLS_ACUITY_SCORE: 46
ADLS_ACUITY_SCORE: 46
ADLS_ACUITY_SCORE: 45
ADLS_ACUITY_SCORE: 55
ADLS_ACUITY_SCORE: 55
ADLS_ACUITY_SCORE: 57
ADLS_ACUITY_SCORE: 53
ADLS_ACUITY_SCORE: 47
ADLS_ACUITY_SCORE: 55
ADLS_ACUITY_SCORE: 53
ADLS_ACUITY_SCORE: 47
ADLS_ACUITY_SCORE: 47
ADLS_ACUITY_SCORE: 53
ADLS_ACUITY_SCORE: 53
ADLS_ACUITY_SCORE: 47
ADLS_ACUITY_SCORE: 48
ADLS_ACUITY_SCORE: 55
IADL_COMMENTS: PER FACILITY
ADLS_ACUITY_SCORE: 47
ADLS_ACUITY_SCORE: 39
ADLS_ACUITY_SCORE: 55
ADLS_ACUITY_SCORE: 53
ADLS_ACUITY_SCORE: 48
ADLS_ACUITY_SCORE: 48
ADLS_ACUITY_SCORE: 50
ADLS_ACUITY_SCORE: 47
ADLS_ACUITY_SCORE: 47
ADLS_ACUITY_SCORE: 53
ADLS_ACUITY_SCORE: 47
ADLS_ACUITY_SCORE: 49
ADLS_ACUITY_SCORE: 53
ADLS_ACUITY_SCORE: 47
ADLS_ACUITY_SCORE: 43
ADLS_ACUITY_SCORE: 55
ADLS_ACUITY_SCORE: 53
ADLS_ACUITY_SCORE: 53
ADLS_ACUITY_SCORE: 50
ADLS_ACUITY_SCORE: 47
ADLS_ACUITY_SCORE: 51
ADLS_ACUITY_SCORE: 39
ADLS_ACUITY_SCORE: 51
ADLS_ACUITY_SCORE: 55
ADLS_ACUITY_SCORE: 47
ADLS_ACUITY_SCORE: 48
ADLS_ACUITY_SCORE: 47
ADLS_ACUITY_SCORE: 48
ADLS_ACUITY_SCORE: 54
ADLS_ACUITY_SCORE: 53
ADLS_ACUITY_SCORE: 47
ADLS_ACUITY_SCORE: 39
ADLS_ACUITY_SCORE: 51
ADLS_ACUITY_SCORE: 53

## 2023-01-01 NOTE — PROGRESS NOTES
Care Management Follow Up    Length of Stay (days): 6    Expected Discharge Date: 01/04/2023     Concerns to be Addressed:       Patient plan of care discussed at interdisciplinary rounds: Yes    Anticipated Discharge Disposition: Transitional Care     Anticipated Discharge Services: None  Anticipated Discharge DME: None    Patient/family educated on Medicare website which has current facility and service quality ratings: yes  Education Provided on the Discharge Plan:    Patient/Family in Agreement with the Plan: yes    Referrals Placed by CM/SW:    Private pay costs discussed: Not applicable    Additional Information:  Writer called Lakshmi and updated her regarding [facility] being the accepting facility. Lakshmi is accepting of this facility and discharge plan. Lakshmi to be updated closer to discharge when transport is arranged.     Addendum 1600: Pas completed for anticipated discharge on the 4th to TCU. Pas placed on chart.    PAS-RR    D: Per DHS regulation, SW completed and submitted PAS-RR to [place] [employer] Direct Connect via the Senior LinkAge Line.  PAS-RR confirmation # is : [id number]    I: SW spoke with patient and they are aware a PAS-RR has been submitted.  SW reviewed with patient  that they may be contacted for a follow up appointment within 10 days of hospital discharge if their SNF stay is < 30 days.  Contact information for Senior LinkAge Line was also provided.    A: patient  verbalized understanding.    P: Further questions may be directed to [facility] LinkAge Line at #[phone], option #4 for PAS-RR staff.        TY Flores, LGSW   Social Work   [facility]

## 2023-01-01 NOTE — PLAN OF CARE
Goal Outcome Evaluation:  Orientation/Cognitive: A&OX4  Observation Goals (Met/ Not Met): NA, inpt  Mobility Level/Assist Equipment: AX1 with GB and walker  Fall Risk (Y/N): Yes  Behavior Concerns: None, pleasant  Pain Management: Denies pain  Tele/VS/O2: VSS on RA, no tele  ABNL Lab/BG: CRP improving  Diet: Easy to chew + thin liquid  Bowel/Bladder: Incontinent, purewick in place  Skin Concerns: scattered bruising and scabs  Drains/Devices: PIV Sled  Tests/Procedures for next shift: None  Anticipated DC date & active delays:  To TCU pending prior auth and COVID recovery on 01/04/2023  Patient Stated Goal for Today: To not choke again

## 2023-01-01 NOTE — PLAN OF CARE
Goal Outcome Evaluation:      Plan of Care Reviewed With: patient    Orientation/Cognitive: WDL  Observation Goals (Met/ Not Met): Inpatient  Mobility Level/Assist Equipment: A1/GB/W  Fall Risk (Y/N): Y  Behavior Concerns: None  Pain Management: BLE chronic pain, on scheduled Tylenol, declined once throughout shift. 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

## 2023-01-01 NOTE — PROGRESS NOTES
"[facility]    Medicine Progress Note - Hospitalist Service    Date of Admission:  12/24/2022    Assessment & Plan      Ham Marie is a 89 year old female who presented to 12/24/2022 with generalized weakness due to acute COVID-19 infection     # Confirmed COVID-19 infection    Describes raspy throat and cough 12/23.  Positive home COVID test     Symptom Onset 12/23/2022   Date of 1st Positive Test 12/24/2022   Vaccination Status Fully Vaccinated         - COVID-19 special precautions, continuous pulse-ox  - Oxygen: Patient is not currently requiring supplemental oxygen.  If needed, titrate to keep SpO2 between 90-96%  - Labs: BMP ok, CRP 35 --> 109 --> 74, LFTs ok, CBC ok,  INR 1.4  - Imaging: CXR 12/25 -mild bibasilar atelectasis.  No focal infiltrate.    - Breathing treatments: no inhalers needed; avoid nebulizers in favor of MDIs   - IV fluids: 500 mL bolus on admission.  No other fluids needed.  - Antibiotics: not indicated   - COVID-Focused Medications: Remdesivir x 3 days, completed  - DVT Prophylaxis:         - At high risk of thrombotic complications due to COVID-19 (DDimer = N/A )         - Already on therapeutic anticoagulation with Eliquis  -CRP is improving     Generalized weakness and physical deconditioning:   Patient presented from home where she lived independently.  Grandson lives at her home as well, though leaves for work during the day.  Patient recognizes that her living situation had already become tenuous, daughter was in the process of looking for care facilities prior to this hospitalization.  -Care management consultation for disposition planning  -Physical therapy consulted-have recommended TCU/LTC  -Patient accepted at [facility].  Will discharge once off COVID-19 precautions     Unstageable sacral pressure injury: Present on admission.    Patient reports that she has had a pressure ulceration of for \"years.\"  -Offloading, frequent mobilization.    -Physical " "therapy consulted  -Wound nurse consulted.  Continue wound care per [facility]    Acute aspiration pneumonitis, 12/31  Minimal oropharyngeal dysphagia  -Patient had an aspiration event while taking medications in morning of 12/31.  RRT was called.  Oxygen saturations remained over 90% but she complains of shortness of breath  -chest x-ray did not show any infiltrate  -SLP consulted.  Diet switched to easy to chew (level 7) with thin liquids.  No straws.  -Albuterol inhaler 4 times daily     History of DVT, pulmonary embolism:  -Continue Eliquis     Chronic atrial fibrillation:  -Continue Eliquis  -Due to bradycardia with heart rate in 50s, metoprolol decreased to 12.5 mg twice daily       Urinary frequency  -Continue Myrbetriq    Right knee pain and swelling-likely due to osteoarthritis  Chronic bilateral lower extremity pain  - Right knee is mildly swollen, warm and slightly tender to touch.  No trauma or falls. No known history of gout  - X-ray showed no acute fractures or malalignment.  Showed moderate to severe medial compartment degenerative changes  -Orthopedics consulted.  Have recommended Tylenol, Voltaren, weightbearing as tolerated, PT/OT  -Cortisone injection deferred as patient reported her knee is feeling fine but could be considered as outpatient  -CRP is improving.  The knee is not warm anymore.    -She has chronic bilateral lower extremity pain  -Continue Tylenol tid scheduled, as needed oxycodone for pain control      Hyponatremia: Lowest Na = 130 mmol/L   - monitor as appropriate.  Now 133    Hypoalbuminemia: Lowest albumin = 3.2 g/dL   - monitor as appropriate    Drug Induced Coagulation Defect: Due to Eliquis     Overweight: Estimated body mass index is 28.75 kg/m  as calculated from the following:    Height as of this encounter: 1.6 m (5' 3\").    Weight as of this encounter: 73.6 kg (162 lb 4.8 oz).             Diet: Combination Diet Easy to Chew (level 7); Thin Liquids (level 0) (no straws)    DVT " "Prophylaxis: DOAC  Barney Catheter: Not present  Central Lines: None  Cardiac Monitoring: None  Code Status: No CPR- Do NOT Intubate      Disposition Plan      Expected Discharge Date: 01/04/2023,  3:00 PM      Discharge Comments: Covid + --placement. Maybe COVID recovered on Jan 4th.  Speech consult today following choking incident.        The patient's care was discussed with the Patient.    Laurie Archibald MD  Hospitalist Service  [facility]  Securely message with the Vocera Web Console (learn more here)  Text page via [employer] Paging/Directory         Clinically Significant Risk Factors              # Hypoalbuminemia: Lowest albumin = 3.2 g/dL at 12/24/2022 11:21 PM, will monitor as appropriate            # Overweight: Estimated body mass index is 29.58 kg/m  as calculated from the following:    Height as of this encounter: 1.6 m (5' 3\").    Weight as of this encounter: 75.8 kg (167 lb).          ______________________________________________________________________    Interval History   Sitting in chair.  Reports she feels very cold.    Reports her legs are sensitive.  Anytime her legs are touched, she experiences pain   Denies any shortness of breath today   No nausea or vomiting.  No further aspiration events       Data reviewed today: I reviewed all medications, new labs and imaging results over the last 24 hours. I personally reviewed no images or EKG's today.    Physical Exam   Vital Signs: Temp: 97.4  F (36.3  C) Temp src: Oral BP: 137/74 Pulse: 53   Resp: 22 SpO2: 99 % O2 Device: None (Room air)    Weight: 167 lbs 0 oz    Constitutional- patient is awake and alert, resting in bed, in no acute distress  Cardiovascular- Irregular rate and rhythm, no murmurs, no edema  Pulmonary-lungs are clear to auscultation bilaterally, no wheezing or rhonchi  GI-abdomen is soft, nontender, nondistended, no hepatosplenomegaly or masses  Integumentary-skin is warm and dry, no rashes or " ulcers  Neurological- alert and oriented x3.  Moving all 4 extremities, normal speech, no focal deficits  Musculoskeletal - right knee is not warm today on exam     Data   Recent Labs   Lab 12/29/22  0635 12/28/22  0637 12/27/22  0626 12/26/22  1457   WBC  --   --  5.3 7.6   HGB  --   --  11.3* 12.1   MCV  --   --  100 101*   PLT  --   --  172 202    133 132*  --    POTASSIUM 4.3 4.0 3.9  --    CHLORIDE 102 103 101  --    CO2 22 24 25  --    BUN 23 25 26  --    CR 0.72 0.65 0.70  --    ANIONGAP 9 6 6  --    APRUVA 8.3* 8.3* 8.1*  --    GLC 94 103* 94  --      Recent Results (from the past 24 hour(s))   XR Chest 2 Views    Narrative    EXAM: XR CHEST 2 VIEWS  LOCATION: [facility]  DATE/TIME: 12/31/2022 1:59 PM    INDICATION: shortness of breath, probable aspiration  COMPARISON: 12/25/2022       Impression    IMPRESSION: Trace pleural fluid suspected. No pneumothorax. No large airspace opacities. The cardiomediastinal silhouette is mildly enlarged. There is mild exaggerated kyphotic curvature of the thoracic spine.      Medications     - MEDICATION INSTRUCTIONS -         acetaminophen  975 mg Oral Q8H     albuterol  2 puff Inhalation 4x Daily     apixaban ANTICOAGULANT  5 mg Oral BID     diclofenac  4 g Topical 4x Daily     levothyroxine  50 mcg Oral Daily     metoprolol tartrate  12.5 mg Oral BID     mirabegron  50 mg Oral Daily     [Held by provider] multivitamin  with lutein  1 capsule Oral BID     sodium chloride (PF)  3 mL Intracatheter Q8H

## 2023-01-01 NOTE — PLAN OF CARE
Goal Outcome Evaluation:         Orientation/Cognitive: WDL  Observation Goals (Met/ Not Met): N/A; inpatient  Mobility Level/Assist Equipment: Assist of 1 with walker/gait belt  Fall Risk (Y/N): Yes  Behavior Concerns: None  Pain Management: Pt denied pain  Tele/VS/O2: AVSS; RA; no Tele  ABNL Lab/BG: CRP 49.9  Diet: Easy to Chew; thin liquids; pills whole with applesauce; no straws  Bowel/Bladder: Voiding; no BM overnight  Skin Concerns: None  Drains/Devices: Purewick  Tests/Procedures for next shift: Speech and PT consults  Anticipated DC date & active delays: To be decided.  Patient Stated Goal for Today: None given.

## 2023-01-02 NOTE — PLAN OF CARE
Goal Outcome Evaluation:  Orientation/Cognitive: A&OX4  Observation Goals (Met/ Not Met): NA, inpt  Mobility Level/Assist Equipment: AX1 with GB and walker  Fall Risk (Y/N): Yes  Behavior Concerns: None, pleasant  Pain Management: Denies pain  Tele/VS/O2: VSS on RA, no tele  ABNL Lab/BG: CRP improving  Diet: Easy to chew + thin liquid  Bowel/Bladder: Incontinent, purewick in place  Skin Concerns: scattered bruising and scabs  Drains/Devices: PIV Sled  Tests/Procedures for next shift: None  Anticipated DC date & active delays:  To [facility] pending prior auth and COVID recovery on 01/04/2023  Patient Stated Goal for Today: To rest up.

## 2023-01-02 NOTE — PROGRESS NOTES
"CLINICAL NUTRITION SERVICES  -  ASSESSMENT NOTE      Recommendations Ordered by Registered Dietitian (RD):   Vanilla Ensure Enlive at 2 pm  Thera-Vit-M daily   Malnutrition:   % Weight Loss:  None noted  % Intake:  <75% for > 7 days (moderate malnutrition) - Suspect  Subcutaneous Fat Loss:  Unable to determine  Muscle Loss:  Unable to determine  Fluid Retention:  Mild - May be partly fluid related?    Malnutrition Diagnosis: Unable to determine due to lack of Nutrition Focused Physical Assessment and recent nutrition history        REASON FOR ASSESSMENT  Ham Marie is a 89 year old female seen by Registered Dietitian for LOS    DX:   Infection due to 2019 novel coronavirus   Generalized weakness and physical deconditioning   Blancheable are to buttocks   Bilateral knee pain --> Severe bilateral knee DJD    NUTRITION HISTORY  - Unable to obtain nutrition history as patient unable to reach via phone and note pt is [place] and has poor vision.    She resides at home with her grandson.  No food allergies/intolerances noted.  Patient is weak and deconditioned.  Per Pharmacy Med Rec on admit, pt was taking MVI-M and Vit C daily.    CURRENT NUTRITION ORDERS  Diet Order:     Dysphagia - Easy to chew (level 7), Thin liquids (level 0), no straw.     Current Intake/Tolerance:  Oral intake has been variable since admission, ranging from 93-27-% (mostly %).   Nursing charting intermittent poor-fair appetite (12/27-12/30).  Patient was offered Ensure supplement on 12/27 and consumed all (prefers vanilla flavor).     12/31:  RRT = Called following acute aspiration of medication event. Pt felt the pill \"go down wrong\".   12/31:  SLP bedside swallow eval = minimal oral-pharyngeal dysphagia --> Easy to chew (level 7), thin liquids (level 0), no straw     NUTRITION FOCUSED PHYSICAL ASSESSMENT FOR DIAGNOSING MALNUTRITION)  No:  Unable to visualize patient due to distancing precautions with COVID-19.              " "  Observed:    N/a    Obtained from Chart/Interdisciplinary Team:  Edema - 1-2+ mild BLE  Pressure Injury - Per WOCN 12/26 = Skin injury due to: Chronic skin injury (often related to prolonged recliner use)  Skin history and plan of care:   Pt reports she sleeps in a recliner chair. Tissue intact and all blanchable.   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  Weight Status:  Overweight BMI 25-29.9  IBW: 52.3 kg  % IBW: 143%  Weight History: Weight gain 5.6 kg over past 15 months    Wt Readings from Last 10 Encounters:   12/30/22 75.8 kg (167 lb)   10/28/22 74.6 kg (164 lb 6.4 oz) - Office visit   10/07/21 69.4 kg (153 lb) - Office visit   06/03/19 68 kg (150 lb)   02/02/17 68 kg (150 lb)   06/13/16 69.9 kg (154 lb)   12/05/15 70.3 kg (155 lb)   07/29/15 69.9 kg (154 lb)   07/13/15 70.6 kg (155 lb 9.6 oz)   12/19/14 65.7 kg (144 lb 14.4 oz)   09/22/14 64 kg (141 lb)   05/13/14 64 kg (141 lb)     LABS  Labs reviewed  Last CRP 49.9 (H) (improving)    MEDICATIONS  Medications reviewed    ASSESSED NUTRITION NEEDS PER APPROVED PRACTICE GUIDELINES:    Dosing Weight:  58 kg (adjusted for overweight)  Estimated Energy Needs: 5129-0055 kcals (25-30 Kcal/Kg)  Justification: overweight  Estimated Protein Needs: 70-87 grams protein (1.2-1.5 g pro/Kg)  Justification: hypercatabolism with acute illness  Estimated Fluid Needs: 9368-6286 mL (1 mL/Kcal)  Justification: maintenance    MALNUTRITION:  % Weight Loss:  None noted  % Intake:  <75% for > 7 days (moderate malnutrition) - Suspect  Subcutaneous Fat Loss:  Unable to determine  Muscle Loss:  Unable to determine  Fluid Retention:  Mild - May be partly fluid related?    Malnutrition Diagnosis: Unable to determine due to lack of Nutrition Focused Physical Assessment and recent nutrition history    NUTRITION DIAGNOSIS:  Predicted suboptimal " nutrient intake related to decreased appetite and intermittent confusion leading to documented variable oral intake.    NUTRITION INTERVENTIONS  Recommendations / Nutrition Prescription  Vanilla Ensure Enlive at 2 pm  Thera-Vit-M daily    Implementation  Nutrition education: Not appropriate at this time due to patient condition  Medical Food Supplement and Multivitamin/mineral supplement therapy - Ordered above in Epic    Nutrition Goals  Patient will consistently consume > 75% meals and > 50% supplement in 3-5 days.    MONITORING AND EVALUATION:  Progress towards goals will be monitored and evaluated per protocol and Practice Guidelines    Asiya Xie RD, LD, CNSC

## 2023-01-02 NOTE — PLAN OF CARE
Orientation/Cognitive: A&O x4, [place]  Observation Goals (Met/ Not Met): inpatient  Mobility Level/Assist Equipment: Assist of 1 with walker/gait belt. T/R, declines at times.  Fall Risk (Y/N): Yes  Behavior Concerns: None  Pain Management: Denies pain, scheduled tylenol given.  Tele/VS/O2: VSS, on RA  ABNL Lab/BG: CRP 49.9  Diet: Easy to Chew; thin liquids; pills whole with applesauce; no straws  Bowel/Bladder: Voiding; Lg BM  Skin Concerns: Unstageable coccyx wound, mepilex in place. Scattered bruises.   Drains/Devices: Purewick overnight, PIV S.L.  Tests/Procedures for next shift: None  Anticipated DC date & active delays: Discharge planned for 1/4/23 to [facility] [place] [facility] once covid recovered. SW following.  Patient Stated Goal for Today: Patient wants to be left alone to sleep at night.  Other: GRANT, Infrequent dry cough.  1-2+ BLE edema.  Frustrated at times about being woke in the night.

## 2023-01-02 NOTE — PROGRESS NOTES
"[facility]    Medicine Progress Note - Hospitalist Service    Date of Admission:  12/24/2022    Assessment & Plan      Ham Marie is a 89 year old female who presented to 12/24/2022 with generalized weakness due to acute COVID-19 infection     # Confirmed COVID-19 infection    Describes raspy throat and cough 12/23.  Positive home COVID test     Symptom Onset 12/23/2022   Date of 1st Positive Test 12/24/2022   Vaccination Status Fully Vaccinated         - COVID-19 special precautions, discontinue continuous pulse-ox  - Oxygen: Patient is not currently requiring supplemental oxygen.  If needed, titrate to keep SpO2 between 90-96%  - Labs: BMP ok, CRP 35 --> 109 --> 74, LFTs ok, CBC ok,  INR 1.4  - Imaging: CXR 12/25 -mild bibasilar atelectasis.  No focal infiltrate.    - Breathing treatments: no inhalers needed; avoid nebulizers in favor of MDIs   - IV fluids: 500 mL bolus on admission.  No other fluids needed.  - Antibiotics: not indicated   - COVID-Focused Medications: Remdesivir x 3 days, completed  - DVT Prophylaxis:         - At high risk of thrombotic complications due to COVID-19 (D Dimer = N/A )         - Already on therapeutic anticoagulation with Eliquis  -CRP is improving 109 --> 49     Generalized weakness and physical deconditioning:   Patient presented from home where she lived independently.  Grandson lives at her home as well, though leaves for work during the day.  Patient recognizes that her living situation had already become tenuous, daughter was in the process of looking for care facilities prior to this hospitalization.  -Care management consultation for disposition planning  -Physical therapy consulted-have recommended TCU/LTC  -Patient accepted at [facility].  Will discharge on 1/4 once off COVID-19 precautions     Unstageable sacral pressure injury: Present on admission.    Patient reports that she has had a pressure ulceration of for \"years.\"  -Offloading, " "frequent mobilization.    -Physical therapy consulted  -Wound nurse consulted.  Continue wound care per [facility]    Acute aspiration pneumonitis, 12/31  Minimal oropharyngeal dysphagia  -Patient had an aspiration event while taking medications in morning of 12/31.  RRT was called.  Oxygen saturations remained over 90% but she complains of shortness of breath  -chest x-ray did not show any infiltrate  -SLP consulted.  Diet switched to easy to chew (level 7) with thin liquids.  No straws.  -Albuterol inhaler 4 times daily as needed     History of DVT, pulmonary embolism:  -Continue Eliquis     Chronic atrial fibrillation:  -Continue Eliquis  -Due to bradycardia with heart rate in 50s, metoprolol decreased to 12.5 mg twice daily       Urinary frequency  -Continue Myrbetriq    Right knee pain and swelling-likely due to osteoarthritis  Chronic bilateral lower extremity pain  - Right knee is mildly swollen, warm and slightly tender to touch.  No trauma or falls. No known history of gout  - X-ray showed no acute fractures or malalignment.  Showed moderate to severe medial compartment degenerative changes  -Orthopedics consulted.  Have recommended Tylenol, Voltaren, weightbearing as tolerated, PT/OT  -Cortisone injection deferred as patient reported her knee is feeling fine but could be considered as outpatient  -CRP is improving.  The knee is not warm anymore.    -She has chronic bilateral lower extremity pain  -Continue Tylenol tid scheduled, as needed oxycodone for pain control      Hyponatremia: Lowest Na = 130 mmol/L   - monitor as appropriate.  Now 133    Hypoalbuminemia: Lowest albumin = 3.2 g/dL   - monitor as appropriate    Drug Induced Coagulation Defect: Due to Eliquis     Overweight: Estimated body mass index is 28.75 kg/m  as calculated from the following:    Height as of this encounter: 1.6 m (5' 3\").    Weight as of this encounter: 73.6 kg (162 lb 4.8 oz).             Diet: Combination Diet Easy to Chew (level 7); " "Thin Liquids (level 0) (no straws)    DVT Prophylaxis: DOAC  Barney Catheter: Not present  Central Lines: None  Cardiac Monitoring: None  Code Status: No CPR- Do NOT Intubate      Disposition Plan      Expected Discharge Date: 01/05/2023,  3:00 PM      Discharge Comments: Covid + --placement. Maybe COVID recovered on Jan 4th.  Speech consult today following choking incident.        The patient's care was discussed with the Patient.    Laurie Archibald MD  Hospitalist Service  [facility]  Securely message with the Vocera Web Console (learn more here)  Text page via [employer] Paging/Directory         Clinically Significant Risk Factors              # Hypoalbuminemia: Lowest albumin = 3.2 g/dL at 12/24/2022 11:21 PM, will monitor as appropriate            # Overweight: Estimated body mass index is 29.58 kg/m  as calculated from the following:    Height as of this encounter: 1.6 m (5' 3\").    Weight as of this encounter: 75.8 kg (167 lb).          ______________________________________________________________________    Interval History   Sitting in chair.  Reports she is doing okay.  Wants to get back to bed.    No nausea or vomiting.  No further aspiration events       Data reviewed today: I reviewed all medications, new labs and imaging results over the last 24 hours. I personally reviewed no images or EKG's today.    Physical Exam   Vital Signs: Temp: 97.9  F (36.6  C) Temp src: Oral BP: 132/59 Pulse: 64   Resp: 20 SpO2: 95 % O2 Device: None (Room air)    Weight: 167 lbs 0 oz    Constitutional- patient is awake and alert, resting in bed, in no acute distress  Cardiovascular- Irregular rate and rhythm, no murmurs, no edema  Pulmonary-lungs are clear to auscultation bilaterally, no wheezing or rhonchi  GI-abdomen is soft, nontender, nondistended, no hepatosplenomegaly or masses  Integumentary-skin is warm and dry, no rashes or ulcers  Neurological- alert and oriented x3.  Moving all 4 extremities, " normal speech, no focal deficits  Musculoskeletal - right knee is not warm today on exam     Data   Recent Labs   Lab 12/29/22  0635 12/28/22  0637 12/27/22  0626 12/26/22  1457   WBC  --   --  5.3 7.6   HGB  --   --  11.3* 12.1   MCV  --   --  100 101*   PLT  --   --  172 202    133 132*  --    POTASSIUM 4.3 4.0 3.9  --    CHLORIDE 102 103 101  --    CO2 22 24 25  --    BUN 23 25 26  --    CR 0.72 0.65 0.70  --    ANIONGAP 9 6 6  --    APURVA 8.3* 8.3* 8.1*  --    GLC 94 103* 94  --      No results found for this or any previous visit (from the past 24 hour(s)).  Medications     - MEDICATION INSTRUCTIONS -         acetaminophen  975 mg Oral Q8H     albuterol  2 puff Inhalation 4x Daily     apixaban ANTICOAGULANT  5 mg Oral BID     diclofenac  4 g Topical 4x Daily     levothyroxine  50 mcg Oral Daily     metoprolol tartrate  12.5 mg Oral BID     mirabegron  50 mg Oral Daily     [Held by provider] multivitamin  with lutein  1 capsule Oral BID     sodium chloride (PF)  3 mL Intracatheter Q8H

## 2023-01-02 NOTE — PLAN OF CARE
Shift:[id number] 1/2/23  Summary: COVID +, [place]  Orientation/Cognitive: A/Ox4  Observation Goals (Met/ Not Met): INP  Mobility Level/Assist Equipment: SBA w/ walker   Fall Risk (Y/N): Yes  Behavior Concerns: none  Pain Management: Scheduled tylenol for pain   Tele/VS/O2: VSS on room air, expt bradycardic at times   ABNL Lab/BG:CRP 49.9  Diet: Easy to chew, thin liquids diet, up to chair for meals  Bowel/Bladder: incontinent at times   Skin Concerns: bruising to buttocks and left hand, Refuses repositioning at times. mepilex dressing in place. +1, +2 edema to BLE, legs elevated in bed  Drains/Devices: IV SL  Tests/Procedures for next shift: none  Anticipated DC date & active delays: SW following for placement, plan for discharge on 1/4/23  Patient Stated Goal for Today: to feel better  Other important info:

## 2023-01-03 NOTE — PLAN OF CARE
Orientation/Cognitive: A/O x4, [place]     Observation Goals (Met/ Not Met): inpatient    Mobility Level/Assist Equipment: Ax1/GB/W, T&R     Fall Risk (Y/N): Yes     Behavior Concerns: Green     Pain Management: Denies, scheduled Tylenol given     Tele/VS/O2: VSS on RA     ABNL Lab/BG: CRP 49.9, see results     Diet: Easy to Chew; thin liquids; pills whole with applesauce; no straws    Bowel/Bladder: Incontinent of bladder at times     Skin Concerns: PI on buttocks, mepilex in place. 1-2+ BLE edema.     Drains/Devices: PIV SL     Tests/Procedures for next shift: None     Anticipated DC date & active delays: Discharge planned for 1/4/23 to [facility] once covid recovered. SW following.    Patient Stated Goal for Today: Rest .

## 2023-01-03 NOTE — PLAN OF CARE
Shift:1/2/23, 11 a to 11 p  Summary: COVID +, [place]  Orientation/Cognitive: A/Ox4  Observation Goals (Met/ Not Met): inpatient  Mobility Level/Assist Equipment: SBA w/ walker   Fall Risk (Y/N): Yes  Behavior Concerns: none  Pain Management: Scheduled tylenol for pain   Tele/VS/O2: VSS on room air  ABNL Lab/BG:CRP 49.9, trending down  Diet: Easy to chew, thin liquids diet, up to chair for meals, picky with food,   Offered enlive, likes Vanilla flavor  Bowel/Bladder: has the tendency to retain, max bladder scan at 699, was able to void adequately when walked to the bathroom  Skin Concerns: bruising to buttocks and left hand, Refuses repositioning at times. mepilex dressing soiled , need to put one on, dressing needs to be changed tomorrow,+2 edema to BLE, legs elevated in bed  Drains/Devices: IV SL  Tests/Procedures for next shift: none  Anticipated DC date & active delays: SW following for placement, plan for discharge on 1/4/23  Patient Stated Goal for Today: to feel better  Other important info: NEEDS assistance in feeding

## 2023-01-03 NOTE — PROGRESS NOTES
[facility]    Medicine Progress Note - Hospitalist Service       Date of Admission:  12/24/2022  Assessment & Plan   Ham Marie is a 89 year old female with history of chronic sacral wound, history of DVT/PE, chronic atrial fibrillation, urinary frequency who presents with generalized weakness. Admitted 12/24/2022.     COVID-19 infection    Describes raspy throat and cough 12/23.  Positive home COVID test  Symptom Onset 12/23/2022   Date of 1st Positive Test 12/24/2022   Vaccination Status Fully Vaccinated         - COVID-19 special precautions, off continuous pulse-ox  - Oxygen: Patient is not currently requiring supplemental oxygen.  If needed, titrate to keep SpO2 between 90-96%  - Labs: BMP ok, CRP 35 --> 109 --> 74, LFTs ok, CBC ok,  INR 1.4  - Imaging: CXR 12/25 - mild bibasilar atelectasis.  No focal infiltrate.    - Breathing treatments: no inhalers needed; avoid nebulizers in favor of MDIs   - IV fluids: 500 mL bolus on admission.  No other fluids needed.  - Antibiotics: not indicated   - COVID-Focused Medications: Remdesivir x 3 days, completed  - DVT Prophylaxis:         - At high risk of thrombotic complications due to COVID-19 (D Dimer = N/A )         - Already on therapeutic anticoagulation apixaban      Generalized weakness and physical deconditioning   Patient presented from home where she lived independently.  Grandson lives at her home as well, though leaves for work during the day.  Patient recognizes that her living situation had already become tenuous, daughter was in the process of looking for care facilities prior to this hospitalization.  - Care management consultation for disposition planning  - Physical therapy consulted - have recommended TCU/LTC  - Patient accepted at [facility].  Plan to discharge on 1/4 once off COVID-19 precautions     Unstageable sacral pressure injury, present on admission    Patient reports that she has had a pressure ulceration for  "\"years.\"  - Offloading, frequent mobilization   - Wound nurse consulted.  Continue wound care per [facility]     Acute aspiration pneumonitis, 12/31  Minimal oropharyngeal dysphagia  *Patient had an aspiration event while taking medications in morning of 12/31.  RRT was called.  Oxygen saturations remained over 90%, but she complained of shortness of breath.  *CXR did not show any infiltrate  - SLP consulted.  Diet switched to easy to chew (level 7) with thin liquids.  No straws.      History of DVT, pulmonary embolism   - Continue prior to admission apixaban      Chronic atrial fibrillation  - Continue prior to admission apixaban   - Metoprolol reduced to 12.5 mg BID this admission due to bradycardia into the 50s      Urinary frequency  - Continue mirabegron     Right knee pain and swelling, likely due to osteoarthritis  Chronic bilateral lower extremity pain  *Right knee is mildly swollen, warm and slightly tender to touch.  No trauma or falls. No known history of gout  *XR showed no acute fractures or malalignment, moderate to severe medial compartment degenerative changes  - Orthopedics consulted; recommended recommended acetaminophen, diclofenac, weightbearing as tolerated, PT/OT  - Cortisone injection deferred as patient reported her knee is feeling fine, but could be considered as outpatient     Hyponatremia  Lowest Na = 130 mmol/L. Improved to 133.       Drug Induced Coagulation Defect  Due to apixaban.     Clinically Significant Risk Factors              # Hypoalbuminemia: Lowest albumin = 3.2 g/dL at 12/24/2022 11:21 PM, will monitor as appropriate           # Overweight: Estimated body mass index is 29.58 kg/m  as calculated from the following:    Height as of this encounter: 1.6 m (5' 3\").    Weight as of this encounter: 75.8 kg (167 lb).             Diet: Combination Diet Easy to Chew (level 7); Thin Liquids (level 0) (no straws)  Snacks/Supplements Adult: Ensure Enlive; Between Meals    DVT Prophylaxis: " DOAC  Barney Catheter: Not present  Code Status: No CPR- Do NOT Intubate      Disposition Plan      Expected Discharge Date: 01/04/2023,  3:00 PM      Discharge Comments: Covid + --placement. Maybe COVID recovered on Jan 4th.  [facility].     Entered: David Rosas MD 01/03/2023, 10:32 AM       The patient's care was discussed with the patient. Message with with SW to confirm placement plans for 1/4/23     David Rosas MD  Hospitalist Service  [facility]    ______________________________________________________________________    Interval History   No acute events overnight.  Denies any new symptoms today.  Breathing feels stable.  Awaiting COVID precautions to clear for discharge    Data reviewed today: I reviewed all medications, new labs and imaging results over the last 24 hours. I personally reviewed no images or EKG's today.    Physical Exam   Vital Signs: Temp: 97.7  F (36.5  C) Temp src: Oral BP: (!) 151/78 Pulse: 66   Resp: 16 SpO2: 97 % O2 Device: None (Room air)    Weight: 167 lbs 0 oz    Constitutional: Well-appearing, NAD  Respiratory: Normal respiratory effort at rest, non-labored breathing on room air   Cardiovascular: Bilateral lower extremity edema to mid-shin  GI: Soft, non-tender, non-distended.    Skin/Integumen: Warm, dry  Neuro: Alert. Hard of hearing. Responding to questions appropriately.      Data   Recent Labs   Lab 12/29/22  0635 12/28/22  0637    133   POTASSIUM 4.3 4.0   CHLORIDE 102 103   CO2 22 24   BUN 23 25   CR 0.72 0.65   ANIONGAP 9 6   APURVA 8.3* 8.3*   GLC 94 103*       No results found for this or any previous visit (from the past 24 hour(s)).  Medications     - MEDICATION INSTRUCTIONS -         acetaminophen  975 mg Oral Q8H     apixaban ANTICOAGULANT  5 mg Oral BID     diclofenac  4 g Topical 4x Daily     levothyroxine  50 mcg Oral QAM AC     metoprolol tartrate  12.5 mg Oral BID     mirabegron  50 mg Oral Daily     [Held by provider]  multivitamin  with lutein  1 capsule Oral BID     multivitamin w/minerals  1 tablet Oral Daily     sodium chloride (PF)  3 mL Intracatheter Q8H

## 2023-01-04 NOTE — PLAN OF CARE
Goal Outcome Evaluation:      Plan of Care Reviewed With: patient    Orientation/Cognitive: WDL  Observation Goals (Met/ Not Met): Inpatient  Mobility Level/Assist Equipment: A1/GB/W  Fall Risk (Y/N): Y  Behavior Concerns: None  Pain Management: BLE chronic pain during ambulation, on scheduled Tylenol. No pain at rest; declining Voltaren gel  Tele/VS/O2: VSS on RA except mild HTN  ABNL Lab/BG: CRP 49.9  Diet: Easy to Chew; thin liquids; pills whole with applesauce; no straws  Bowel/Bladder: Voiding; No BM; senna offered but declined  Skin Concerns: Chronic unstageable pressure sore to sacrum; mepilex applied   Drains/Devices: Purewick; PIV SL  Tests/Procedures for next shift: None  Anticipated DC date & active delays: Possible 1/4 once covid recovered to [facility]  Patient Stated Goal for Today: Get to the chair for meals

## 2023-01-04 NOTE — PLAN OF CARE
Speech Language Therapy Discharge Summary    Reason for therapy discharge:    Discharged to transitional care facility.    Progress towards therapy goal(s). See goals on Care Plan in [facility] electronic health record for goal details.  Goals not met.  Barriers to achieving goals:   discharge from facility.    Therapy recommendation(s):    May benefit from short-term SLP follow up at TCU for clinical tolerance of easy to chew solids (IDDSI 7b). Monitor if this may be pt's baseline vs appropriate for upgrades to regular diet.

## 2023-01-04 NOTE — PLAN OF CARE
Orientation/Cognitive: AO X4, [place]  Observation Goals (Met/ Not Met): Inpatient  Mobility Level/Assist Equipment: A1 GB W  Fall Risk (Y/N): Y  Behavior Concerns: None  Pain Management: BLE chronic pain during ambulation, on scheduled Tylenol, Denies pain  Tele/VS/O2: VSS on RA except mild HTN  ABNL Lab/BG: CRP 49.9  Diet: Easy to Chew; thin liquids; pills with applesauce; no straws  Bowel/Bladder: Voiding  Skin Concerns: Pressure injury on buttocks ; mepilex applied   Drains/Devices: Purewick; PIV SL  Tests/Procedures for next shift: None  Anticipated DC date & active delays: Possible today 1/4/23 once covid recovered, plan to discharge to [place]  Patient Stated Goal for Today: Rest

## 2023-01-04 NOTE — PLAN OF CARE
Goal Outcome Evaluation:      Plan of Care Reviewed With: patient    Orientation/Cognitive: WDL  Observation Goals (Met/ Not Met): Inpatient  Mobility Level/Assist Equipment: A1/GB/W  Fall Risk (Y/N): Y  Behavior Concerns: None  Pain Management: BLE chronic pain during ambulation, on scheduled Tylenol. No pain at rest; declining Voltaren gel  Tele/VS/O2: VSS on RA   ABNL Lab/BG: CRP 49.9  Diet: Easy to Chew; thin liquids; pills whole with applesauce; no straws  Bowel/Bladder: Voiding; No BM; senna given  Skin Concerns: Chronic unstageable pressure sore to sacrum; mepilex applied   Drains/Devices: PIV out  Tests/Procedures for next shift: None  Anticipated DC date & active delays: Today to [facility] at 3:00 pm by wheelchair   Patient Stated Goal for Today: Discharge

## 2023-01-04 NOTE — CONSULTS
Infection Prevention Note: Patient is recovered from COVID-19 and Special precautions has been discontinued.     Patient COVID+ 12/24/22 (day zero of isolation)   1/3/23: Day ten of isolation   1/4/23: First day patient can be considered COVID recovered    Radha Powell, Infection Prevention on 1/4/2023 at 11:15 AM

## 2023-01-04 NOTE — PLAN OF CARE
Physical Therapy Discharge Summary     Reason for therapy discharge:    Discharged to transitional care facility.     Progress towards therapy goal(s). See goals on Care Plan in [facility] electronic health record for goal details.  Goals not met.  Barriers to achieving goals:   discharge from facility.     Therapy recommendation(s):    Continued therapy is recommended.  Rationale/Recommendations:  Patient would benefit from PT eval at TCU in order to increase strength, activity tolerance, balance and independence with mobility.    **Pt not seen by discharging therapist on this date, note written based on previous treating therapist's notes and recommendations

## 2023-01-04 NOTE — DISCHARGE SUMMARY
[facility]  Hospitalist Discharge Summary      Date of Admission:  12/24/2022  Date of Discharge:  1/5/2023  Discharging Provider: Sinan Vincent DO  Discharge Service: Hospitalist Service    Discharge Diagnoses     COVID-19 infection    Generalized weakness and physical deconditioning   Unstageable sacral pressure injury, present on admission    Acute aspiration pneumonitis  Minimal oropharyngeal dysphagia  History of DVT, pulmonary embolism   Chronic atrial fibrillation  Right knee pain and swelling, likely due to osteoarthritis  Chronic bilateral lower extremity pain   Hyponatremia   Drug Induced Coagulation Defect        Follow-ups Needed After Discharge   Follow-up Appointments     Follow Up and recommended labs and tests      Follow-up with Dr. Jones ([facility]) as needed for any   future recurrences of bilateral knee pain. No need for follow up labs or   testing prior to an appointment.     Please contact [phone] to schedule or for any questions related to   your orthopedic injury/surgery.         Follow Up and recommended labs and tests      Follow up with Nursing home physician.  No follow up labs or test are   needed.  Follow up with PCP 1-2 weeks after TCU discharge   Follow up with orthopedic surgery as recommended             Unresulted Labs Ordered in the Past 30 Days of this Admission     No orders found from 11/24/2022 to 12/25/2022.        Discharge Disposition   Discharged to short-term care facility  Condition at discharge: Stable    Hospital Course   Ham Marie is a 89 year old female with history of chronic sacral wound, history of DVT/PE, chronic atrial fibrillation, urinary frequency who presents with generalized weakness. Admitted 12/24/2022.     COVID-19 infection    Describes raspy throat and cough 12/23.  Positive home COVID test  Symptom Onset 12/23/2022   Date of 1st Positive Test 12/24/2022   Vaccination Status Fully  "Vaccinated         - COVID-19 special precautions, off continuous pulse-ox  - Oxygen: Patient is not currently requiring supplemental oxygen.  If needed, titrate to keep SpO2 between 90-96%  - Labs: BMP ok, CRP 35 --> 109 --> 74, LFTs ok, CBC ok,  INR 1.4  - Imaging: CXR 12/25 - mild bibasilar atelectasis.  No focal infiltrate.    - Breathing treatments: no inhalers needed; avoid nebulizers in favor of MDIs   - IV fluids: 500 mL bolus on admission.  No other fluids needed.  - Antibiotics: not indicated   - COVID-Focused Medications: Remdesivir x 3 days, completed  - DVT Prophylaxis:         - At high risk of thrombotic complications due to COVID-19 (D Dimer = N/A )         - Already on therapeutic anticoagulation apixaban      Generalized weakness and physical deconditioning   Patient presented from home where she lived independently.  Grandson lives at her home as well, though leaves for work during the day.  Patient recognizes that her living situation had already become tenuous, daughter was in the process of looking for care facilities prior to this hospitalization.  - Care management consultation for disposition planning  - Physical therapy consulted - have recommended TCU/LTC  - Patient accepted at [facility].  Plan to discharge on 1/4 once off COVID-19 precautions     Unstageable sacral pressure injury, present on admission    Patient reports that she has had a pressure ulceration for \"years.\"  - Offloading, frequent mobilization   - Wound nurse consulted.  Continue wound care per [facility]     Acute aspiration pneumonitis, 12/31  Minimal oropharyngeal dysphagia  *Patient had an aspiration event while taking medications in morning of 12/31.  RRT was called.  Oxygen saturations remained over 90%, but she complained of shortness of breath.  *CXR did not show any infiltrate  - SLP consulted.  Diet switched to easy to chew (level 7) with thin liquids.  No straws.      History of DVT, pulmonary embolism   - Continue prior " to admission apixaban      Chronic atrial fibrillation  - Continue prior to admission apixaban   - Metoprolol reduced to 12.5 mg BID this admission due to bradycardia into the 50s      Urinary frequency issues  - Continue mirabegron     Right knee pain and swelling, likely due to osteoarthritis  Chronic bilateral lower extremity pain  *Right knee is mildly swollen, warm and slightly tender to touch.  No trauma or falls. No known history of gout  *XR showed no acute fractures or malalignment, moderate to severe medial compartment degenerative changes  - Orthopedics consulted; recommended recommended acetaminophen, diclofenac, weightbearing as tolerated, PT/OT  - Cortisone injection deferred as patient reported her knee is feeling fine, but could be considered as outpatient     Hyponatremia  Lowest Na = 130 mmol/L. Improved to 133.       Drug Induced Coagulation Defect  Due to apixaban.         Consultations This Hospital Stay   WOUND OSTOMY CONTINENCE NURSE  IP CONSULT  PHYSICAL THERAPY ADULT IP CONSULT  CARE MANAGEMENT / SOCIAL WORK IP CONSULT  ORTHOPEDIC SURGERY IP CONSULT  SPEECH LANGUAGE PATH ADULT IP CONSULT  INFECTION PREVENTION IP CONSULT  PHYSICAL THERAPY ADULT IP CONSULT  OCCUPATIONAL THERAPY ADULT IP CONSULT    Code Status   No CPR- Do NOT Intubate    Time Spent on this Encounter   I, Sinan Vincent DO, personally saw the patient today and spent greater than 30 minutes discharging this patient.       Sinna Vincent DO  [facility] EXTENDED RECOVERY AND SHORT STAY  [address] [place] [id number]  Phone: [phone]  ______________________________________________________________________    Physical Exam   Vital Signs: Temp: 97.4  F (36.3  C) Temp src: Oral BP: (!) 146/68 Pulse: 63   Resp: 18 SpO2: 98 % O2 Device: None (Room air)    Weight: 167 lbs 0 oz  General Appearance: Resting comfortably in bed.  NAD  Respiratory: Clear to auscultation.  No respiratory  distress  Cardiovascular: RRR.  ?Faint murmur?  GI: Soft.  Non-distended  Skin: No obvious rashes or cyanosis  Other: Alert.  Very hard of hearing        Primary Care Physician   Wilmer Church    Discharge Orders      Follow Up and recommended labs and tests    Follow-up with Dr. Jones ([facility]) as needed for any future recurrences of bilateral knee pain. No need for follow up labs or testing prior to an appointment.     Please contact [phone] to schedule or for any questions related to your orthopedic injury/surgery.     Activity - Up with assistive device     Weight bearing status    WBAT bilateral lower extremities.     General info for SNF    Length of Stay Estimate: Short Term Care: Estimated # of Days <30  Condition at Discharge: Stable  Level of care:skilled   Rehabilitation Potential: Fair  Admission H&P remains valid and up-to-date: Yes  Recent Chemotherapy: N/A  Use Nursing Home Standing Orders: Yes     Mantoux instructions    Give two-step Mantoux (PPD) Per Facility Policy Yes     Follow Up and recommended labs and tests    Follow up with Nursing home physician.  No follow up labs or test are needed.  Follow up with PCP 1-2 weeks after TCU discharge   Follow up with orthopedic surgery as recommended     Reason for your hospital stay    COVID     Activity - Up with nursing assistance     Physical Therapy Adult Consult    Evaluate and treat as clinically indicated.    Reason:  Deconditioning     Occupational Therapy Adult Consult    Evaluate and treat as clinically indicated.    Reason:  Deconditioning     Fall precautions     Diet    Follow this diet upon discharge: Orders Placed This Encounter      Snacks/Supplements Adult: Ensure Enlive; Between Meals      Combination Diet Easy to Chew (level 7); Thin Liquids (level 0) (no straws)       Significant Results and Procedures   Most Recent 3 CBC's:Recent Labs   Lab Test 12/27/22  0626 12/26/22  1457 12/24/22  2321   WBC 5.3 7.6 9.5    HGB 11.3* 12.1 12.5    101* 102*    202 212     Most Recent 3 BMP's:Recent Labs   Lab Test 12/29/22  0635 12/28/22  0637 12/27/22  0626    133 132*   POTASSIUM 4.3 4.0 3.9   CHLORIDE 102 103 101   CO2 22 24 25   BUN 23 25 26   CR 0.72 0.65 0.70   ANIONGAP 9 6 6   APURVA 8.3* 8.3* 8.1*   GLC 94 103* 94   ,   Results for orders placed or performed during the hospital encounter of 12/24/22   Chest XR,  PA & LAT    Narrative    EXAM: XR CHEST 2 VIEWS  LOCATION: [facility]  DATE/TIME: 12/25/2022 12:35 AM    INDICATION: Cough.  COMPARISON: 03/14/2011      Impression    IMPRESSION:     Mild bibasilar atelectasis. Linear atelectasis or scarring at the left midlung. No focal airspace disease. No pleural effusion or pneumothorax.    Mild cardiomegaly. Aortic calcifications.    Multilevel degenerative changes of the spine.   XR Knee Port Bilateral 1/2 Views    Narrative    EXAM: XR KNEE PORT BILATERAL 1/2 VIEWS  LOCATION: [facility]  DATE/TIME: 12/25/2022 10:33 AM    INDICATION: assisted fall, knee pain  COMPARISON: None.      Impression    IMPRESSION:   RIGHT KNEE: No acute fracture or malalignment. Severe medial compartment degenerative changes with bone-on-bone articulation. Mild patellofemoral compartment marginal bony spurring. Small knee joint effusion. Mild chondrocalcinosis in the lateral   compartment. Osteopenia. Atherosclerosis.    LEFT KNEE: No acute fracture or malalignment. Moderate to severe medial compartment degenerative changes. No knee joint effusion. Osteopenia. Atherosclerosis.   XR Chest 2 Views    Narrative    EXAM: XR CHEST 2 VIEWS  LOCATION: [facility]  DATE/TIME: 12/31/2022 1:59 PM    INDICATION: shortness of breath, probable aspiration  COMPARISON: 12/25/2022       Impression    IMPRESSION: Trace pleural fluid suspected. No pneumothorax. No large airspace opacities. The cardiomediastinal silhouette is  mildly enlarged. There is mild exaggerated kyphotic curvature of the thoracic spine.        Discharge Medications   Current Discharge Medication List      START taking these medications    Details   acetaminophen (TYLENOL) 325 MG tablet Take 3 tablets (975 mg) by mouth every 8 hours as needed for mild pain    Associated Diagnoses: Primary osteoarthritis of both knees      benzonatate (TESSALON) 100 MG capsule Take 1 capsule (100 mg) by mouth 3 times daily as needed for cough    Associated Diagnoses: Infection due to 2019 novel coronavirus      diclofenac (VOLTAREN) 1 % topical gel Apply 4 g topically 4 times daily    Associated Diagnoses: Primary osteoarthritis of both knees         CONTINUE these medications which have NOT CHANGED    Details   apixaban ANTICOAGULANT (ELIQUIS) 5 MG tablet Take 5 mg by mouth 2 times daily      calcium carbonate (OSCAL 500) 1250 (500 CA) MG TABS Take 1 tablet (1,250 mg) by mouth 2 times daily    Associated Diagnoses: Hypocalcemia      cholecalciferol (VITAMIN D) 1000 UNIT tablet Take 1 tablet (1,000 Units) by mouth daily  Qty: 100 tablet, Refills: 3    Associated Diagnoses: Adjustment disorder with anxious mood      levothyroxine (SYNTHROID/LEVOTHROID) 50 MCG tablet Take 50 mcg by mouth daily      magnesium 250 MG tablet Take 1 tablet by mouth daily.      metoprolol tartrate (LOPRESSOR) 25 MG tablet Take 25 mg by mouth 2 times daily      mirabegron (MYRBETRIQ) 50 MG 24 hr tablet Take 1 tablet (50 mg) by mouth daily  Qty: 90 tablet, Refills: 3    Associated Diagnoses: Urinary frequency      Multiple Vitamins-Minerals (PRESERVISION AREDS PO) Take 1 tablet by mouth 2 times daily      MULTIVITAMIN TABS   OR Take 1 tablet by mouth daily    Associated Diagnoses: Mixed hyperlipidemia; Pernicious anemia; Degeneration of lumbar or lumbosacral intervertebral disc; Other pulmonary embolism and infarction; Other specified acquired hypothyroidism; Carpal tunnel syndrome      vitamin C (ASCORBIC  ACID) 500 MG tablet Take 500 mg by mouth daily           Allergies   Allergies   Allergen Reactions     Erythromycin      upsets stomach     Zocor [Simvastatin - High Dose] Rash

## 2023-01-04 NOTE — PROGRESS NOTES
Care Management Discharge Note    Discharge Date: 01/04/2023       Discharge Disposition: Transitional Care    Discharge Services: None    Discharge DME: None    Discharge Transportation:      Private pay costs discussed: Not applicable    PAS Confirmation Code: [id number]  Patient/family educated on Medicare website which has current facility and service quality ratings: yes    Education Provided on the Discharge Plan:    Persons Notified of Discharge Plans: Patient   Patient/Family in Agreement with the Plan: yes    Handoff Referral Completed: Yes    Additional Information:  Patient will be discharging at 1500 to [facility]. Orders faxed. PAS completed and on the front of the chart.        BROCK Mera

## 2023-01-05 NOTE — PROGRESS NOTES
[facility] GERIATRICS    PRIMARY CARE PROVIDER AND CLINIC:  Wilmer Church, [address] / [place] [address]  Chief Complaint   Patient presents with     Hospital F/U      [facility] Record Number:  [id number]  Place of Service where encounter took place:  [facility] (TCU) [[id number]]    Ham Marie  is a 89 year old  (2/27/1921), admitted to the above facility from  [facility]. Hospital stay 12/24/22 through 1/4/23.  HPI:    89 year old female with history of chronic sacral wound, history of DVT/PE, chronic atrial fibrillation, urinary frequency hospitalized with weakness, COVID-19 infection positive on 12/23.  CXR mild bibasilar atelectasis, completed remdesivir x 3 days, on apixaban. Sacral pressure ulcer managed per WO orders, pressure relief. Minimal oropharyngeal dysphagia, aspiration pneumonia: no antibiotics, on room air and diet changed to easy chew with thin liquids. Hx DVT, PE on apixaban. A fib on metoprolol. Urinary frequency on mirabegron. OA with right knee pain and swelling, LE pain: tylenol, diclofenac, WBAT. Hyponatremia Na 130 improved to 133. To TCU for rehab.     Patient seen for initial TCU visit. Reviewed and completed POLST - DNR/DNI desired. No headaches, dizziness, chest pain, dyspnea, bowel or bladder issues. Right knee moderate pain, fair management with current meds and measures. BP range 120-170/ since admission. Sats 96% room air.     CODE STATUS/ADVANCE DIRECTIVES DISCUSSION:  Prior  DNR / DNI  ALLERGIES:   Allergies   Allergen Reactions     Erythromycin      upsets stomach     Zocor [Simvastatin - High Dose] Rash      PAST MEDICAL HISTORY:   Past Medical History:   Diagnosis Date     Actinic keratoses      Arthritis      Carpal tunnel syndrome     right     DVT (deep venous thrombosis) (H) 2000    right      Heart murmur      Hypertension      Other and unspecified hyperlipidemia      Pulmonary embolism (H) 2000      Unspecified hypothyroidism       PAST SURGICAL HISTORY:   has a past surgical history that includes TOTAL ABDOM HYSTERECTOMY; appendectomy; and Arthroscopy knee with debridement joint, combined (5/13/2014).  FAMILY HISTORY: family history is not on file.  SOCIAL HISTORY:   reports that she has never smoked. She has never used smokeless tobacco. She reports current alcohol use. She reports that she does not use drugs.  Patient's living condition: lives with family, GRANDSON     Post Discharge Medication Reconciliation Status:   MED REC REQUIRED  Post Medication Reconciliation Status:  Discharge medications reconciled and changed, see notes/orders      ROS:  10 point ROS of systems including Constitutional, Eyes, Respiratory, Cardiovascular, Gastroenterology, Genitourinary, Integumentary, Musculoskeletal, Psychiatric were all negative except for pertinent positives noted in my HPI.    Vitals:  BP (!) 159/93   Pulse 69   Temp 97.8  F (36.6  C)   Resp 18   SpO2 97%   Exam:  GENERAL APPEARANCE:  Alert, in no distress, pleasant, cooperative, oriented x self, place and recent events  EYES:  EOM, lids, pupils and irises normal, sclera clear and conjunctiva normal, no discharge or mattering on lids or lashes noted  ENT:  Mouth normal, moist mucous membranes, nose normal without drainage or crusting, external ears without lesions, hearing acuity impaired  NECK: supple, symmetrical, trachea midline  RESP:  respiratory effort normal, no chest wall tenderness, no respiratory distress, Lung sounds few crackles RLL, patient is on room air  CV:  Auscultation of heart done, rate and rhythm irregularly irregular, no murmur, no rub or gallop. Edema trace bilateral lower extremities.   ABDOMEN:  normal bowel sounds, soft, nontender, no palpable masses.  M/S:   Gait and station unsafe without assistance, no tenderness or swelling of the joints; able to move all extremities, digits normal  SKIN:  Inspection and palpation of skin and  subcutaneous tissue: skin on extremities warm, dry and intact without rashes  NEURO: cranial nerves 2-12 grossly intact, no facial asymmetry, no speech deficits and able to follow directions, moves all extremities symmetrically  PSYCH:  insight and judgement and memory appear at baseline, affect and mood normal     Lab/Diagnostic data:  Most Recent 3 CBC's:  Recent Labs   Lab Test 12/27/22  0626 12/26/22  1457 12/24/22  2321   WBC 5.3 7.6 9.5   HGB 11.3* 12.1 12.5    101* 102*    202 212     Most Recent 3 BMP's:  Recent Labs   Lab Test 12/29/22  0635 12/28/22  0637 12/27/22  0626    133 132*   POTASSIUM 4.3 4.0 3.9   CHLORIDE 102 103 101   CO2 22 24 25   BUN 23 25 26   CR 0.72 0.65 0.70   ANIONGAP 9 6 6   APURVA 8.3* 8.3* 8.1*   GLC 94 103* 94     Most Recent TSH and T4:  Recent Labs   Lab Test 12/24/22  2321   TSH 8.31*   T4 1.00       ASSESSMENT/PLAN:  Infection due to 2019 novel coronavirus  Acute, improved. Continue tessalon 100 mg TID PRN cough, monitor respiratory status and f/u next visit.     Wound of sacral region, subsequent encounter  Chronic, continue wound care per WO orders and monitor status. Staff to update provider if not effective.     Dysphagia, unspecified type  Ongoing, ST eval and treat.     Chronic atrial fibrillation (H)  History of pulmonary embolism  Personal history of DVT (deep vein thrombosis)  Chronic, continue Eliquis 5 mg BID, lopressor 25 mg BID, monitor vs and review ranges next visit. Due to anticoagulation check CBC 1/9.     Urinary frequency  Managed well with PTA Myrbetriq 50 mg daily, monitor.     Hypothyroidism  Ongoing, continue synthroid 50 mcg daily.   Lab Results   Component Value Date    TSH 8.31 12/24/2022    TSH 1.91 02/02/2017     Primary osteoarthritis involving multiple joints  Physical deconditioning  Acute on chronic. Continue tylenol 975 mg TID PRN, Oscal 1250 mg BID, vit d 1000 units daily, Voltaren 4 g QID to painful joints, therapies as  ordered and f/u progress next visit.     Hyponatremia  Resolved last check. Repeat BMP 1/9 and f/u results.     Advanced directives, counseling/discussion  Reviewed and completed POLST - DNR/DNI desired    Orders:  1. DNR/DNI  2. discontinue MVI  3. CBC and BMP 1/9 diagnosis weakness, anemia, hyponatremia    Total unit/floor time of 46 minutes spent consisted of the following: examination of patient, reviewing the record including pertinent labs and imaging. More than 50% of this time was spent in coordination of care with the patient, nursing staff, and other healthcare providers. This time was spent on discussing the care plan including labs, discharge/safe placement, and specialty follow up. Additional specific discussion included review and completion of POLST, management of pain, monitoring of respiratory status and management of cough, needed f/u labs, expected TCU course/routine/discharge planning and clarification of meds/orders with nursing for a total of over 24 min.    Electronically signed by:  LASHAY Rai CNP

## 2023-01-05 NOTE — PROGRESS NOTES
[facility]    Background: Transitional Care Management program identified per system criteria and reviewed by [facility] team for possible outreach.    Assessment: Upon chart review, CCRC Team member will not proceed with patient outreach related to this episode of Transitional Care Management program due to reason below:    Non-[facility] TCU: Patient is not established within [facility] Primary Care and CCRC team member noted patient discharged to TCU/ARU/LTACH.    Plan: Transitional Care Management episode addressed appropriately per reason noted above.        Meg Jameson  Community Health Worker  [facility] [place], [facility]    *Connected Care Resource Team does NOT follow patient ongoing. Referrals are identified based on internal discharge reports and the outreach is to ensure patient has an understanding of their discharge instructions.

## 2023-01-09 NOTE — PROGRESS NOTES
[facility]  INITIAL VISIT NOTE  January 10, 2023    PRIMARY CARE PROVIDER AND CLINIC:  Wilmer Church [address] / [place] [address]    CHIEF COMPLAINT:  Hospital follow-up/Initial visit    HPI:    Ham Marie is a 89 year old  (2/27/1921) female who was seen at [place] [facility] on January 10, 2023 for an initial visit.     Medical history is notable for hypertension, LV diastolic dysfunction, dyslipidemia, permanent atrial fibrillation, hypothyroidism, PE/DVT, overactive bladder, osteoarthritis, and macular degeneration.    Summary of hospital course:  Patient was hospitalized at [facility] from December 24, 2022 through January 5, 2023 for acute COVID-19 infection.  She originally presented with generalized weakness and positive COVID test at home.  Chest x-ray on admission showed mild bibasilar atelectasis, mild cardiomegaly, and no focal airspace disease.  Initial blood work was remarkable for elevated CRP of 35.1, TSH of 8.31, white count of 9.5, and hemoglobin of 12.5.  Patient was fully vaccinated against COVID-19.  She was treated with 3-day course of remdesivir.  [facility] RN was consulted for pressure injury of sacrum which was present on admission.  She had an aspiration event on December 31 while taking medications.  Repeat x-ray showed no infiltrate.  While hospitalized, she developed hyponatremia with a willard sodium of 130 which eventually improved.  TCU was recommended per therapies.    Patient is admitted to this facility for medical management, nursing care, and rehab.     Of note, history was obtained from patient, facility RN, and extensive review of the chart.    Today's visit:  Patient was seen in her room, while sitting on the edge of the bed.  She appears extremely frail but in no acute distress.  She denies fever, chills, cough, sputum, dyspnea, chest pain, palpitation, nausea, vomiting, abdominal pain, or urinary symptoms.  She had a  bowel movement yesterday.      CODE STATUS:   DNR / DNI    PAST MEDICAL HISTORY:   COVID-19 infection in December 2022  Hypertension  Grade 1 LV diastolic dysfunction  Dyslipidemia  Permanent atrial fibrillation  Hypothyroidism  Pulmonary embolism  Right lower extremity DVT   Overactive bladder  Carpal tunnel syndrome  Actinic keratosis  Osteoarthritis  Macular degeneration  Unstageable sacral pressure injury    Past Medical History:   Diagnosis Date     Actinic keratoses      Arthritis      Carpal tunnel syndrome     right     DVT (deep venous thrombosis) (H) 2000    right      Heart murmur      Hypertension      Other and unspecified hyperlipidemia      Pulmonary embolism (H) 2000     Unspecified hypothyroidism        PAST SURGICAL HISTORY:   Past Surgical History:   Procedure Laterality Date     APPENDECTOMY       ARTHROSCOPY KNEE WITH DEBRIDEMENT JOINT, COMBINED  5/13/2014    Procedure: ARTHROSCOPY KNEE WITH DEBRIDEMENT JOINT;  Surgeon: Missael Packer MD;  Location:  TOTAL ABDOM HYSTERECTOMY      Hysterectomy, Total Abdominal       FAMILY HISTORY:   Family History   Problem Relation Age of Onset     C.A.D. No family hx of      Breast Cancer No family hx of      Cancer - colorectal No family hx of        SOCIAL HISTORY:  Social History     Tobacco Use     Smoking status: Never     Smokeless tobacco: Never   Substance Use Topics     Alcohol use: Yes     Alcohol/week: 0.0 standard drinks     Comment: rarely       MEDICATIONS:  Current Outpatient Medications   Medication Sig Dispense Refill     acetaminophen (TYLENOL) 325 MG tablet Take 3 tablets (975 mg) by mouth every 8 hours as needed for mild pain       apixaban ANTICOAGULANT (ELIQUIS) 5 MG tablet Take 5 mg by mouth 2 times daily       benzonatate (TESSALON) 100 MG capsule Take 1 capsule (100 mg) by mouth 3 times daily as needed for cough       calcium carbonate (OSCAL 500) 1250 (500 CA) MG TABS Take 1 tablet (1,250 mg) by mouth 2 times daily        "cholecalciferol (VITAMIN D) 1000 UNIT tablet Take 1 tablet (1,000 Units) by mouth daily 100 tablet 3     diclofenac (VOLTAREN) 1 % topical gel Apply 4 g topically 4 times daily       levothyroxine (SYNTHROID/LEVOTHROID) 50 MCG tablet Take 50 mcg by mouth daily       magnesium 250 MG tablet Take 1 tablet by mouth daily.       metoprolol tartrate (LOPRESSOR) 25 MG tablet Take 25 mg by mouth 2 times daily       mirabegron (MYRBETRIQ) 50 MG 24 hr tablet Take 1 tablet (50 mg) by mouth daily 90 tablet 3     Multiple Vitamins-Minerals (PRESERVISION AREDS PO) Take 1 tablet by mouth 2 times daily       vitamin C (ASCORBIC ACID) 500 MG tablet Take 500 mg by mouth daily         MED REC REQUIRED  Post Medication Reconciliation Status: Medications were reconciled, continue without change.         ALLERGIES:  Allergies   Allergen Reactions     Erythromycin      upsets stomach     Zocor [Simvastatin - High Dose] Rash       ROS:  10 point ROS were negative other than the symptoms noted above in the HPI.    PHYSICAL EXAM:  Vital signs were reviewed in the chart.  Vital Signs: BP (!) 164/78   Pulse 64   Temp 97.7  F (36.5  C)   Resp 18   Ht 1.6 m (5' 3\")   Wt 74 kg (163 lb 3.2 oz)   SpO2 96%   BMI 28.91 kg/m    General: Comfortable and in no acute distress  HEENT: No conjunctival pallor, no scleral icterus or injection, moist oral mucosa  Cardiovascular: Normal S1, S2, irregularly irregular rhythm  Respiratory: Lungs clear to auscultation bilaterally  GI: Abdomen soft, non-tender, non-distended, +BS  Extremities: 2+ bilateral chronic appearing LE edema  Neuro: CX II-XII grossly intact; ROM in all four extremities grossly intact  Psych: Alert and oriented almost x3; normal affect  Skin: No acute rash    LABORATORY/IMAGING DATA:  All relevant labs and imaging data in [facility] and/or Care Everywhere were personally reviewed today.      Most Recent 3 CBC's:Recent Labs   Lab Test 12/27/22  0626 12/26/22  1457 12/24/22  2321   WBC 5.3 " 7.6 9.5   HGB 11.3* 12.1 12.5    101* 102*    202 212     Most Recent 3 BMP's:Recent Labs   Lab Test 12/29/22  0635 12/28/22  0637 12/27/22  0626    133 132*   POTASSIUM 4.3 4.0 3.9   CHLORIDE 102 103 101   CO2 22 24 25   BUN 23 25 26   CR 0.72 0.65 0.70   ANIONGAP 9 6 6   APURVA 8.3* 8.3* 8.1*   GLC 94 103* 94     Most Recent 2 LFT's:Recent Labs   Lab Test 12/24/22  2321 02/02/17  1532   AST 20 21   ALT 15 18   ALKPHOS 74 68   BILITOTAL 1.0 0.5         ASSESSMENT/PLAN:  COVID-19 infection.  Fully vaccinated.  Patient was treated with 3-day course of remdesivir.  She is COVID recovered and currently stable from a respiratory standpoint.  Plan:  Continue benzonatate 100 mg 3 times daily as needed for cough  Monitor respiratory status    Essential hypertension,  Grade 1 LV diastolic dysfunction.  Blood pressure is at times mildly elevated.  Goal SBP less than 160 given age.  She currently weighs 163.2 LBS and has 2+ chronic appearing bilateral lower extremity edema.  Plan:  Continue metoprolol 25 mg twice daily  Monitor weight and volume status    Permanent atrial fibrillation.  Heart rate is fairly controlled.  Plan:  Continue metoprolol 25 mg twice daily  Continue chronic anticoagulation with Eliquis 5 mg twice daily  Monitor heart rate    Hyponatremia.  Resolved.  Plan:  Monitor sodium level periodically    Anemia.  Gradual decline in hemoglobin from 12.5 to 11.3 in the setting of acute illness as well as phlebotomies.  Plan:  Recheck CBC on January 11    Dyslipidemia.  Not currently treated, likely due to age.  Plan:  No indication for initiation of cholesterol medication    Hypothyroidism.  Last TSH of 8.31 on December 24, 2022.  Levothyroxine dose was not adjusted in the hospital.  Plan:  Continue levothyroxine 50 mcg daily  Recheck TSH in 4 weeks    History of pulmonary embolism,  History of right lower extremity DVT.  Plan:  Continue chronic anticoagulation with Eliquis 5 mg twice  daily    Overactive bladder.  Plan:  Continue Myrbetriq 50 mg daily    Unstageable sacral pressure injury.  Present on admission.  Plan:  Continue wound care per WOC RN instructions    Osteoarthritis,  Right knee pain,  Chronic bilateral lower extremity pain.  Plan:  Continue pain management with PRN acetaminophen and topical diclofenac gel    Physical deconditioning.  Plan:  Continue PT/OT evaluation and therapy          Orders written by provider at facility:  CBC on January 11, 2023, DX: Anemia  BMP on January 11, 2023, DX: Hyponatremia        Recommendation by provider at facility:  TSH in 4 weeks        Disclaimer: This note may contain text created using speech-recognition software and may contain unintended word substitutions.      Electronically signed by:  Nora Umanzor MD

## 2023-01-10 NOTE — LETTER
1/10/2023        RE: Ham Marie  [address] [place] [address]        [facility]  INITIAL VISIT NOTE  January 10, 2023    PRIMARY CARE PROVIDER AND CLINIC:  Wilmer Church [address] / [place] [address]    CHIEF COMPLAINT:  Hospital follow-up/Initial visit    HPI:    Ham Marie is a 89 year old  (2/27/1921) female who was seen at [place] [facility] on January 10, 2023 for an initial visit.     Medical history is notable for hypertension, LV diastolic dysfunction, dyslipidemia, permanent atrial fibrillation, hypothyroidism, PE/DVT, overactive bladder, osteoarthritis, and macular degeneration.    Summary of hospital course:  Patient was hospitalized at [facility] from December 24, 2022 through January 5, 2023 for acute COVID-19 infection.  She originally presented with generalized weakness and positive COVID test at home.  Chest x-ray on admission showed mild bibasilar atelectasis, mild cardiomegaly, and no focal airspace disease.  Initial blood work was remarkable for elevated CRP of 35.1, TSH of 8.31, white count of 9.5, and hemoglobin of 12.5.  Patient was fully vaccinated against COVID-19.  She was treated with 3-day course of remdesivir.  [facility] RN was consulted for pressure injury of sacrum which was present on admission.  She had an aspiration event on December 31 while taking medications.  Repeat x-ray showed no infiltrate.  While hospitalized, she developed hyponatremia with a willard sodium of 130 which eventually improved.  TCU was recommended per therapies.    Patient is admitted to this facility for medical management, nursing care, and rehab.     Of note, history was obtained from patient, facility RN, and extensive review of the chart.    Today's visit:  Patient was seen in her room, while sitting on the edge of the bed.  She appears extremely frail but in no acute distress.  She denies fever, chills, cough, sputum,  dyspnea, chest pain, palpitation, nausea, vomiting, abdominal pain, or urinary symptoms.  She had a bowel movement yesterday.      CODE STATUS:   DNR / DNI    PAST MEDICAL HISTORY:   COVID-19 infection in December 2022  Hypertension  Grade 1 LV diastolic dysfunction  Dyslipidemia  Permanent atrial fibrillation  Hypothyroidism  Pulmonary embolism  Right lower extremity DVT   Overactive bladder  Carpal tunnel syndrome  Actinic keratosis  Osteoarthritis  Macular degeneration  Unstageable sacral pressure injury    Past Medical History:   Diagnosis Date     Actinic keratoses      Arthritis      Carpal tunnel syndrome     right     DVT (deep venous thrombosis) (H) 2000    right      Heart murmur      Hypertension      Other and unspecified hyperlipidemia      Pulmonary embolism (H) 2000     Unspecified hypothyroidism        PAST SURGICAL HISTORY:   Past Surgical History:   Procedure Laterality Date     APPENDECTOMY       ARTHROSCOPY KNEE WITH DEBRIDEMENT JOINT, COMBINED  5/13/2014    Procedure: ARTHROSCOPY KNEE WITH DEBRIDEMENT JOINT;  Surgeon: Missael Packer MD;  Location: [facility] TOTAL ABDOM HYSTERECTOMY      Hysterectomy, Total Abdominal       FAMILY HISTORY:   Family History   Problem Relation Age of Onset     C.A.D. No family hx of      Breast Cancer No family hx of      Cancer - colorectal No family hx of        SOCIAL HISTORY:  Social History     Tobacco Use     Smoking status: Never     Smokeless tobacco: Never   Substance Use Topics     Alcohol use: Yes     Alcohol/week: 0.0 standard drinks     Comment: rarely       MEDICATIONS:  Current Outpatient Medications   Medication Sig Dispense Refill     acetaminophen (TYLENOL) 325 MG tablet Take 3 tablets (975 mg) by mouth every 8 hours as needed for mild pain       apixaban ANTICOAGULANT (ELIQUIS) 5 MG tablet Take 5 mg by mouth 2 times daily       benzonatate (TESSALON) 100 MG capsule Take 1 capsule (100 mg) by mouth 3 times daily as needed for cough        "calcium carbonate (OSCAL 500) 1250 (500 CA) MG TABS Take 1 tablet (1,250 mg) by mouth 2 times daily       cholecalciferol (VITAMIN D) 1000 UNIT tablet Take 1 tablet (1,000 Units) by mouth daily 100 tablet 3     diclofenac (VOLTAREN) 1 % topical gel Apply 4 g topically 4 times daily       levothyroxine (SYNTHROID/LEVOTHROID) 50 MCG tablet Take 50 mcg by mouth daily       magnesium 250 MG tablet Take 1 tablet by mouth daily.       metoprolol tartrate (LOPRESSOR) 25 MG tablet Take 25 mg by mouth 2 times daily       mirabegron (MYRBETRIQ) 50 MG 24 hr tablet Take 1 tablet (50 mg) by mouth daily 90 tablet 3     Multiple Vitamins-Minerals (PRESERVISION AREDS PO) Take 1 tablet by mouth 2 times daily       vitamin C (ASCORBIC ACID) 500 MG tablet Take 500 mg by mouth daily         MED REC REQUIRED  Post Medication Reconciliation Status: Medications were reconciled, continue without change.         ALLERGIES:  Allergies   Allergen Reactions     Erythromycin      upsets stomach     Zocor [Simvastatin - High Dose] Rash       ROS:  10 point ROS were negative other than the symptoms noted above in the HPI.    PHYSICAL EXAM:  Vital signs were reviewed in the chart.  Vital Signs: BP (!) 164/78   Pulse 64   Temp 97.7  F (36.5  C)   Resp 18   Ht 1.6 m (5' 3\")   Wt 74 kg (163 lb 3.2 oz)   SpO2 96%   BMI 28.91 kg/m    General: Comfortable and in no acute distress  HEENT: No conjunctival pallor, no scleral icterus or injection, moist oral mucosa  Cardiovascular: Normal S1, S2, irregularly irregular rhythm  Respiratory: Lungs clear to auscultation bilaterally  GI: Abdomen soft, non-tender, non-distended, +BS  Extremities: 2+ bilateral chronic appearing LE edema  Neuro: CX II-XII grossly intact; ROM in all four extremities grossly intact  Psych: Alert and oriented almost x3; normal affect  Skin: No acute rash    LABORATORY/IMAGING DATA:  All relevant labs and imaging data in [facility] and/or Care Everywhere were personally reviewed " today.      Most Recent 3 CBC's:Recent Labs   Lab Test 12/27/22  0626 12/26/22  1457 12/24/22  2321   WBC 5.3 7.6 9.5   HGB 11.3* 12.1 12.5    101* 102*    202 212     Most Recent 3 BMP's:Recent Labs   Lab Test 12/29/22  0635 12/28/22  0637 12/27/22  0626    133 132*   POTASSIUM 4.3 4.0 3.9   CHLORIDE 102 103 101   CO2 22 24 25   BUN 23 25 26   CR 0.72 0.65 0.70   ANIONGAP 9 6 6   APURVA 8.3* 8.3* 8.1*   GLC 94 103* 94     Most Recent 2 LFT's:Recent Labs   Lab Test 12/24/22  2321 02/02/17  1532   AST 20 21   ALT 15 18   ALKPHOS 74 68   BILITOTAL 1.0 0.5         ASSESSMENT/PLAN:  COVID-19 infection.  Fully vaccinated.  Patient was treated with 3-day course of remdesivir.  She is COVID recovered and currently stable from a respiratory standpoint.  Plan:  Continue benzonatate 100 mg 3 times daily as needed for cough  Monitor respiratory status    Essential hypertension,  Grade 1 LV diastolic dysfunction.  Blood pressure is at times mildly elevated.  Goal SBP less than 160 given age.  She currently weighs 163.2 LBS and has 2+ chronic appearing bilateral lower extremity edema.  Plan:  Continue metoprolol 25 mg twice daily  Monitor weight and volume status    Permanent atrial fibrillation.  Heart rate is fairly controlled.  Plan:  Continue metoprolol 25 mg twice daily  Continue chronic anticoagulation with Eliquis 5 mg twice daily  Monitor heart rate    Hyponatremia.  Resolved.  Plan:  Monitor sodium level periodically    Anemia.  Gradual decline in hemoglobin from 12.5 to 11.3 in the setting of acute illness as well as phlebotomies.  Plan:  Recheck CBC on January 11    Dyslipidemia.  Not currently treated, likely due to age.  Plan:  No indication for initiation of cholesterol medication    Hypothyroidism.  Last TSH of 8.31 on December 24, 2022.  Levothyroxine dose was not adjusted in the hospital.  Plan:  Continue levothyroxine 50 mcg daily  Recheck TSH in 4 weeks    History of pulmonary  embolism,  History of right lower extremity DVT.  Plan:  Continue chronic anticoagulation with Eliquis 5 mg twice daily    Overactive bladder.  Plan:  Continue Myrbetriq 50 mg daily    Unstageable sacral pressure injury.  Present on admission.  Plan:  Continue wound care per WOC RN instructions    Osteoarthritis,  Right knee pain,  Chronic bilateral lower extremity pain.  Plan:  Continue pain management with PRN acetaminophen and topical diclofenac gel    Physical deconditioning.  Plan:  Continue PT/OT evaluation and therapy          Orders written by provider at facility:  CBC on January 11, 2023, DX: Anemia  BMP on January 11, 2023, DX: Hyponatremia        Recommendation by provider at facility:  TSH in 4 weeks        Disclaimer: This note may contain text created using speech-recognition software and may contain unintended word substitutions.      Electronically signed by:  Nora Umanzor MD                          Sincerely,        Nora Umanzor MD

## 2023-01-10 NOTE — PROGRESS NOTES
"WOUND VISIT AT [facility]    ASSESSMENT:   1. Deep tissue injury of buttocks and chronic venous congestion - improving    PLAN/DISCUSSION:   1. Apply barrier cream with pericares  2. Encourage patient to reposition frequently  3. Sleep in bed rather than recliner    HISTORY OF PRESENT ILLNESS:   Ham Marie is a 89 year old female who is seen at [facility] today. Was recently hospitalized at [facility] from 12/24-1/5 for generalized weakness and COVID 19 infection.  She was noted to have some purple discoloration of buttocks and minor skin breakdown in the hospital. She states PTA was sleeping and spending most of time in recliner. Had previous history of sore for \"years.\"    TREATMENT COURSE:  1/5/2023 : Initial visit at [facility].     PHYSICAL EXAM:  GENERAL: Patient is alert and oriented and in no acute distress  INTEGUMENTARY:       MDM: 30 minutes were spent on the date of the visit reviewing previous chart notes, evaluating patient and developing the treatment plan, this excludes any time spent on procedures    Electronically signed by Meenakshi Abraham PA-C on January 10, 2023        "

## 2023-01-18 NOTE — PROGRESS NOTES
"[facility]    Chief Complaint   Patient presents with     RECHECK     HPI:  Ham Marie is a 89 year old  (2/27/1921), who is being seen today for an episodic care visit at: [facility] (TCU) [[id number]].     Per recent TCU provider progress notes:   89 year old female with history of chronic sacral wound, history of DVT/PE, chronic atrial fibrillation, urinary frequency hospitalized with weakness, COVID-19 infection positive on 12/23.  CXR mild bibasilar atelectasis, completed remdesivir x 3 days, on apixaban. Sacral pressure ulcer managed per WOC orders, pressure relief. Minimal oropharyngeal dysphagia, aspiration pneumonia: no antibiotics, on room air and diet changed to easy chew with thin liquids. Hx DVT, PE on apixaban. A fib on metoprolol. Urinary frequency on mirabegron. OA with right knee pain and swelling, LE pain: tylenol, diclofenac, WBAT. Hyponatremia Na 130 improved to 133. To TCU for rehab.     Today's concern is: seen for f/u respiratory status, pain, vs and mobility. Reports no cough or dyspnea. Pain in knees not well managed, discussed scheduling tylenol. No headaches, dizziness, chest pain, bowel or bladder issues. Walked 25 ft with 2WW and CGA. BP range 118-158/70-95 and sats 100% room air.     Allergies, and PMH/PSH reviewed in [facility] today.  REVIEW OF SYSTEMS:  4 point ROS including Respiratory, CV, GI and , other than that noted in the HPI,  is negative    Objective:   /70   Pulse 61   Temp 97.7  F (36.5  C)   Resp 18   Ht 1.549 m (5' 1\")   Wt 74 kg (163 lb 3.2 oz)   SpO2 100%   BMI 30.84 kg/m    GENERAL APPEARANCE:  Alert, in no distress, pleasant, cooperative, oriented x self, place and recent events  EYES:  EOM, lids, pupils and irises normal, sclera clear and conjunctiva normal, no discharge or mattering on lids or lashes noted  ENT:  Mouth normal, moist mucous membranes, nose normal without drainage or crusting, external ears without lesions, " hearing acuity impaired  RESP:  respiratory effort normal, no chest wall tenderness, no respiratory distress, Lung sounds clear and patient is on room air  CV:  Auscultation of heart done, rate and rhythm irregularly irregular, no murmur, no rub or gallop. Edema trace bilateral lower extremities.   ABDOMEN:  normal bowel sounds, soft, nontender, no palpable masses.  M/S:   Gait and station unsafe without assistance, no tenderness or swelling of the joints; able to move all extremities, digits normal  SKIN:  Inspection and palpation of skin and subcutaneous tissue: skin on extremities warm, dry and intact without rashes  NEURO: cranial nerves 2-12 grossly intact, no facial asymmetry, no speech deficits and able to follow directions, moves all extremities symmetrically  PSYCH:  insight and judgement and memory appear at baseline, affect and mood normal     1/11/23 Hgb 11.8  Most Recent 3 CBC's:  Recent Labs   Lab Test 12/27/22  0626 12/26/22  1457 12/24/22  2321   WBC 5.3 7.6 9.5   HGB 11.3* 12.1 12.5    101* 102*    202 212     1/11/23: Na 133, K 4.9, BUN 35, Cr 0.71  Most Recent 3 BMP's:  Recent Labs   Lab Test 12/29/22  0635 12/28/22  0637 12/27/22  0626    133 132*   POTASSIUM 4.3 4.0 3.9   CHLORIDE 102 103 101   CO2 22 24 25   BUN 23 25 26   CR 0.72 0.65 0.70   ANIONGAP 9 6 6   APURVA 8.3* 8.3* 8.1*   GLC 94 103* 94       Assessment/Plan:  COVID-19 infection  Resolved. No symptoms. Stop PRN benzonatate. Monitor respiratory status    Essential hypertension  Grade 1 LV diastolic dysfunction  Permanent atrial fibrillation  History of pulmonary embolism  History of right lower extremity DVT  Chronic, managed well. Continue metoprolol 25 mg twice daily. Continue Eliquis. Monitor vs and wt and review next visit.     Anemia  Gradual decline due to illness. Hgb 11.8 on 1/11. Monitor PRN.     Hypothyroidism  Chronic. Continue levothyroxine 50 mcg daily. Recheck TSH in 4 weeks due to TSH somewhat elevated  at hospital last check but dose not changed.     Overactive bladder  Continue Myrbetriq 50 mg daily    Osteoarthritis  Right knee pain  Physical deconditioning  Acute on chronic.  Schedule tylenol. Continue PT/OT evaluation and therapy    MED REC REQUIRED  Post Medication Reconciliation Status:  Discharge medications reconciled and changed, see notes/orders    Orders:  discontinue benzonatate  Change tylenol to 975 mg BID and daily PRN pain    Electronically signed by: LASHAY Rai CNP

## 2023-01-24 NOTE — PROGRESS NOTES
[facility] GERIATRICS DISCHARGE SUMMARY  PATIENT'S NAME: Ham Marie  YOB: 1921  MEDICAL RECORD NUMBER:  [id number]  Place of Service where encounter took place:  [facility] [[id number]]    PRIMARY CARE PROVIDER AND CLINIC RESPONSIBLE AFTER TRANSFER:   Wilmer Church, [address] / [place] [address]    [facility] Provider     Transferring providers: LASHAY Rai CNP, Dr. Erna MD  Recent Hospitalization/ED:  [facility] Hospital stay 12/24/22 to 1/4/23.  Date of SNF Admission: 1/4/23  Date of SNF (anticipated) Discharge: 1/25/23  Discharged to: previous assisted living  Cognitive Scores: not available  Physical Function: Ambulating 150 ft with fww  DME: Wheelchair    CODE STATUS/ADVANCE DIRECTIVES DISCUSSION:  Prior   ALLERGIES: Erythromycin and Zocor [simvastatin - high dose]    NURSING FACILITY COURSE   Medication Changes/Rationale:     Scheduled tylenol due to pain    Stopped tessalon Perles    Per recent TCU provider progress notes:   89 year old female with history of chronic sacral wound, history of DVT/PE, chronic atrial fibrillation, urinary frequency hospitalized with weakness, COVID-19 infection positive on 12/23.  CXR mild bibasilar atelectasis, completed remdesivir x 3 days, on apixaban. Sacral pressure ulcer managed per [facility] orders, pressure relief. Minimal oropharyngeal dysphagia, aspiration pneumonia: no antibiotics, on room air and diet changed to easy chew with thin liquids. Hx DVT, PE on apixaban. A fib on metoprolol. Urinary frequency on mirabegron. OA with right knee pain and swelling, LE pain: tylenol, diclofenac, WBAT. Hyponatremia Na 130 improved to 133. To TCU for rehab.     Patient seen for discharge visit. No new concerns. BP range 135-158/70-87 and sats 95% room air. Home with home care as ordered below.     Summary of nursing facility stay:   COVID-19 infection  Resolved. No symptoms. Stopped PRN benzonatate.  Monitor respiratory status    Essential hypertension  Grade 1 LV diastolic dysfunction  Permanent atrial fibrillation  History of pulmonary embolism  History of right lower extremity DVT  Chronic, managed well. Continue metoprolol 25 mg twice daily. Continue Eliquis. Monitor vs and wt and review next PCP visit.     Anemia  Gradual decline due to illness. Hgb 11.8 on 1/11. Monitor PRN.     Hypothyroidism  Chronic. Continue levothyroxine 50 mcg daily. Recheck TSH in 4 weeks due to TSH somewhat elevated at hospital last check but dose not changed.     Overactive bladder  Continue Myrbetriq 50 mg daily    Osteoarthritis  Right knee pain  Physical deconditioning  Acute on chronic.  Scheduled tylenol. Home with home care    Discharge Medications:  MED REC REQUIRED  Post Medication Reconciliation Status:  Discharge medications reconciled, continue medications without change    Current Outpatient Medications   Medication Sig Dispense Refill     acetaminophen (TYLENOL) 325 MG tablet Take 3 tablets (975 mg) by mouth every 8 hours as needed for mild pain (Patient taking differently: Take 975 mg by mouth 2 times daily And daily PRN)       apixaban ANTICOAGULANT (ELIQUIS) 5 MG tablet Take 5 mg by mouth 2 times daily       calcium carbonate (OSCAL 500) 1250 (500 CA) MG TABS Take 1 tablet (1,250 mg) by mouth 2 times daily       cholecalciferol (VITAMIN D) 1000 UNIT tablet Take 1 tablet (1,000 Units) by mouth daily 100 tablet 3     diclofenac (VOLTAREN) 1 % topical gel Apply 4 g topically 4 times daily       levothyroxine (SYNTHROID/LEVOTHROID) 50 MCG tablet Take 50 mcg by mouth daily       magnesium 250 MG tablet Take 1 tablet by mouth daily.       metoprolol tartrate (LOPRESSOR) 25 MG tablet Take 25 mg by mouth 2 times daily       mirabegron (MYRBETRIQ) 50 MG 24 hr tablet Take 1 tablet (50 mg) by mouth daily 90 tablet 3     Multiple Vitamins-Minerals (PRESERVISION AREDS PO) Take 1 tablet by mouth 2 times daily       vitamin C  "(ASCORBIC ACID) 500 MG tablet Take 500 mg by mouth daily          Controlled medications:   not applicable/none     Past Medical History:   Past Medical History:   Diagnosis Date     Actinic keratoses      Arthritis      Carpal tunnel syndrome     right     DVT (deep venous thrombosis) (H) 2000    right      Heart murmur      Hypertension      Other and unspecified hyperlipidemia      Pulmonary embolism (H) 2000     Unspecified hypothyroidism      Physical Exam:   Vitals: BP (!) 144/87   Pulse 60   Temp 97.3  F (36.3  C)   Resp 18   Ht 1.549 m (5' 1\")   Wt 74.1 kg (163 lb 4.8 oz)   SpO2 95%   BMI 30.86 kg/m   BMI: Body mass index is 30.86 kg/m .  GENERAL APPEARANCE:  Alert, in no distress, pleasant, cooperative, oriented x self, place and recent events  EYES:  EOM, lids, pupils and irises normal, sclera clear and conjunctiva normal, no discharge or mattering on lids or lashes noted  ENT:  Mouth normal, moist mucous membranes, nose normal without drainage or crusting, external ears without lesions, hearing acuity impaired  RESP:  respiratory effort normal, no chest wall tenderness, no respiratory distress, Lung sounds clear, patient is on room air  CV:  Auscultation of heart done, rate and rhythm controlled and irregularly irregular, no murmur, no rub or gallop. Edema trace bilateral lower extremities.   M/S:   Gait and station walks with assist , no tenderness or swelling of the joints; able to move all extremities, digits normal  NEURO: cranial nerves 2-12 grossly intact, no facial asymmetry, no speech deficits and able to follow directions, moves all extremities symmetrically  PSYCH:  insight and judgement and memory at baseline, affect and mood normal     SNF labs:   Lab Results   Component Value Date    TSH 8.31 12/24/2022    TSH 1.91 02/02/2017 1/12 Hgb 11.8 and Cr 0.71  Most Recent 3 CBC's:Recent Labs   Lab Test 12/27/22  0626 12/26/22  1457 12/24/22  2321   WBC 5.3 7.6 9.5   HGB 11.3* 12.1 12.5 "    101* 102*    202 212     Most Recent 3 BMP's:Recent Labs   Lab Test 12/29/22  0635 12/28/22  0637 12/27/22  0626    133 132*   POTASSIUM 4.3 4.0 3.9   CHLORIDE 102 103 101   CO2 22 24 25   BUN 23 25 26   CR 0.72 0.65 0.70   ANIONGAP 9 6 6   APURVA 8.3* 8.3* 8.1*   GLC 94 103* 94       DISCHARGE PLAN:    Follow up labs: TSH recheck 4 weeks    Medical Follow Up:      Follow up with primary care provider in 2-3 weeks    [facility] scheduled appointments:   No future appointments.     Discharge Services: Home Care:  Occupational Therapy, Physical Therapy and From:  [facility]    Discharge Instructions Verbalized to Patient at Discharge:     None    TOTAL DISCHARGE TIME:   Greater than 30 minutes  Electronically signed by:  LASHAY Rai CNP     Documentation of Face to Face and Certification for Home Health Services    I certify that services are/were furnished while this patient was under the care of a physician and that a physician or an allowed non-physician practitioner (NPP), had a face-to-face encounter that meets the physician face-to-face encounter requirements. The encounter was in whole, or in part, related to the primary reason for home health. The patient is confined to his/her home and needs intermittent skilled nursing, physical therapy, speech-language pathology, or the continued need for occupational therapy. A plan of care has been established by a physician and is periodically reviewed by a physician.  Date of Face-to-Face Encounter: 1/24/2023.    I certify that, based on my findings, the following services are medically necessary home health services: Occupational Therapy and Physical Therapy.    My clinical findings support the need for the above skilled services because: Requires assistance of another person or specialized equipment to access medical services because patient: Is unable to exit home safely on own due to: weakness, pain...    Patient to re-establish plan  of care with their PCP within 7-10 days after leaving the facility to reestablish care.  Medicare certified PECOS provider: LASHAY Rai CNP  Date: 2023      MEDICAL NECESSITY STATEMENT FOR DME    Demographic Information on Ham Marie:    Ham aMrie  Gender: female  : 1921  [address] [place] [address]  [phone] (home)     Medical Record: [id number]  Social Security Number: [id number]  Primary Care Provider: Wilmer Church  Insurance: Payor: MEDICA / Plan: [employer] / Product Type: HMO /        Infection due to 2019 novel coronavirus  Essential hypertension  Diastolic dysfunction  Permanent atrial fibrillation (H)  Anemia, unspecified type  Hypothyroidism, unspecified type  History of pulmonary embolism  History of deep venous thrombosis  OAB (overactive bladder)  Osteoarthritis, unspecified osteoarthritis type, unspecified site  Acute pain of right knee  Physical deconditioning     DME:  WHEELCHAIR: standard manual wheelchair seat width 18 inches back and seat cushion   Standard foot rest YES   Elevating leg rest  NO   Dose patient use oxygen NO   Able to propel w/c YES    Mobility related ADL that are affected in the home hygiene grooming toileting   The wheelchair is suitable and necessary for use in the patient's home and the Home/rooms can accommodate the wheelchair.     Reason why a cane or walker will not meet the patient's needs: weakness, pain, fall risk   The patient has expressed willingness to use the wheelchair in the home and    Does have the physical and cognitive ability to maneuver the equipment or has a    Caregiver who is available, willing, and able to provide assistance in the home    With the wheelchair.     Hospital bed: semi-electric with half rails  The patient does  require positioning of the body in ways not feasible with an ordinary bed due to a medical condition that is expected to last at least 1 month  "due to weakness, pain.   The patient does  require, for the alleviation of pain, postioning of the body in ways not feasible with an ordinary bed.   The patient does not require the head of bed elevated more than 30* most of the time due to CHF, chronic pulmonary disease or aspiration.  The patient does not require traction that can only be attached to a hospital bed.  The patient does  require a bed height different than a fixed height hospital bed to permit tranfers to wheelchair or standing position.   The patient does  require frequent or immediate changes in body position due to pressure sore coccyx        VITAL SIGNS:  Vitals: BP (!) 144/87   Pulse 60   Temp 97.3  F (36.3  C)   Resp 18   Ht 1.549 m (5' 1\")   Wt 74.1 kg (163 lb 4.8 oz)   SpO2 95%   BMI 30.86 kg/m    BMI= Body mass index is 30.86 kg/m .   see progress note above    MEDICAL NECESSITY STATEMENT 99 months length of need     Infection due to 2019 novel coronavirus  Essential hypertension  Diastolic dysfunction  Permanent atrial fibrillation (H)  Anemia, unspecified type  Hypothyroidism, unspecified type  History of pulmonary embolism  History of deep venous thrombosis  OAB (overactive bladder)  Osteoarthritis, unspecified osteoarthritis type, unspecified site  Acute pain of right knee  Physical deconditioning     ELECTRONICALLY SIGNED BY TIKA CERTIFIED PROVIDER:  LASHAY Rai CNP   NPI [id number]   [facility]  [address] [place] [address], [address]  [place] [address]                  "

## 2023-04-01 NOTE — ED NOTES
[facility]  ED Nurse Handoff Report    ED Chief complaint: Leg Pain      ED Diagnosis:   Final diagnoses:   Left knee pain, unspecified chronicity   Primary osteoarthritis of left knee   Primary osteoarthritis of right hip       Code Status: DNR / DNI    Allergies:   Allergies   Allergen Reactions     Erythromycin      upsets stomach     Zocor [Simvastatin - High Dose] Rash       Patient Story: Pt BIBA from assisted living. Severe L leg pain and mobility concerns. Chronic R leg pain.   Focused Assessment:  No hx falls. Pt up with walker independently at base. Unable to bear weight in ED with walker and assist x2.    Treatments and/or interventions provided: Labs, PIV, tramadol; XR  Labs Ordered and Resulted from Time of ED Arrival to Time of ED Departure   BASIC METABOLIC PANEL - Abnormal       Result Value    Sodium 132 (*)     Potassium 4.4      Chloride 95 (*)     Carbon Dioxide (CO2) 27      Anion Gap 10      Urea Nitrogen 27.0 (*)     Creatinine 0.66      Calcium 9.8 (*)     Glucose 118 (*)     GFR Estimate 77     CRP INFLAMMATION - Abnormal    CRP Inflammation 59.95 (*)    URIC ACID - Normal    Uric Acid 3.4     CBC WITH PLATELETS AND DIFFERENTIAL    WBC Count 8.7      RBC Count 3.95      Hemoglobin 12.5      Hematocrit 38.3      MCV 97      MCH 31.6      MCHC 32.6      RDW 14.3      Platelet Count 419      % Neutrophils 64      % Lymphocytes 19      % Monocytes 9      % Eosinophils 6      % Basophils 1      % Immature Granulocytes 1      NRBCs per 100 WBC 0      Absolute Neutrophils 5.7      Absolute Lymphocytes 1.6      Absolute Monocytes 0.8      Absolute Eosinophils 0.5      Absolute Basophils 0.1      Absolute Immature Granulocytes 0.1      Absolute NRBCs 0.0     ROUTINE UA WITH MICROSCOPIC REFLEX TO CULTURE     Patient's response to treatments and/or interventions: Tolerated    To be done/followed up on inpatient unit:  Pain management. Observation.    Does this patient have any  cognitive concerns?: N/A    Activity level - Baseline/Home:  Walker  Activity Level - Current:   Total Care    Patient's Preferred language: English   Needed?: No    Isolation: None  Infection: Not Applicable  Patient tested for COVID 19 prior to admission: NO  Bariatric?: No    Vital Signs:   Vitals:    04/01/23 1452 04/01/23 1455 04/01/23 1600 04/01/23 1700   BP: (!) 168/85  (!) 149/86 (!) 165/142   Pulse: 68  69 67   Resp:  16 16 16   Temp:  98.1  F (36.7  C)     TempSrc:  Oral     SpO2:  92% 93% 93%       Cardiac Rhythm:     Was the PSS-3 completed:   Yes  What interventions are required if any?               Family Comments: N/A  OBS brochure/video discussed/provided to patient/family: Yes              Name of person given brochure if not patient:               Relationship to patient:     For the majority of the shift this patient's behavior was Green.   Behavioral interventions performed were .    ED NURSE PHONE NUMBER: *[phone]

## 2023-04-01 NOTE — ED PROVIDER NOTES
History     Chief Complaint:  Leg Pain       HPI   Ham Marie is a 89 year old female with a history of degenerative joint disease and right leg pain since December who presents from assisted living where she normally gets around with a walker but has had increasing left knee and lower leg pain over the past few days to where today she could not bear weight on it because of the pain.  Hurts to move.  She has been taking Tylenol without much help.  She has not had fevers or chills, no recent trauma or falling.  She does not have a history of gout.  She had COVID back in December and was very weak and her right leg has been bothering her since then.  Most of the pain is in the left knee but she has some she set up in the hip.  No back or neck pain, her upper extremity seem to be fine.  No nausea vomiting or abdominal pain.      Independent Historian:   None - Patient Only    Review of External Notes: I reviewed her discharge summary from 1/4/2023.    ROS:  Review of Systems    Allergies:  Erythromycin  Zocor [Simvastatin - High Dose]     Medications:    acetaminophen (TYLENOL) 325 MG tablet  apixaban ANTICOAGULANT (ELIQUIS) 5 MG tablet  calcium carbonate (OSCAL 500) 1250 (500 CA) MG TABS  cholecalciferol (VITAMIN D) 1000 UNIT tablet  diclofenac (VOLTAREN) 1 % topical gel  levothyroxine (SYNTHROID/LEVOTHROID) 50 MCG tablet  magnesium 250 MG tablet  metoprolol tartrate (LOPRESSOR) 25 MG tablet  mirabegron (MYRBETRIQ) 50 MG 24 hr tablet  Multiple Vitamins-Minerals (PRESERVISION AREDS PO)  vitamin C (ASCORBIC ACID) 500 MG tablet        Past Medical History:    Past Medical History:   Diagnosis Date     Actinic keratoses      Arthritis      Carpal tunnel syndrome      DVT (deep venous thrombosis) (H) 2000     Heart murmur      Hypertension      Other and unspecified hyperlipidemia      Pulmonary embolism (H) 2000     Unspecified hypothyroidism        Past Surgical History:    Past Surgical History:   Procedure  Laterality Date     APPENDECTOMY       ARTHROSCOPY KNEE WITH DEBRIDEMENT JOINT, COMBINED  5/13/2014    Procedure: ARTHROSCOPY KNEE WITH DEBRIDEMENT JOINT;  Surgeon: Missael Packer MD;  Location: [facility] TOTAL ABDOM HYSTERECTOMY      Hysterectomy, Total Abdominal        Family History:    family history is not on file.    Social History:   reports that she has never smoked. She has never used smokeless tobacco. She reports current alcohol use. She reports that she does not use drugs.  PCP: Eduar Salinas     Physical Exam     Patient Vitals for the past 24 hrs:   BP Temp Temp src Pulse Resp SpO2   04/01/23 1700 (!) 165/142 -- -- 67 16 93 %   04/01/23 1600 (!) 149/86 -- -- 69 16 93 %   04/01/23 1455 -- 98.1  F (36.7  C) Oral -- 16 92 %   04/01/23 1452 (!) 168/85 -- -- 68 -- --        Physical Exam  Nursing note and vitals reviewed.    Constitutional:  Appears comfortable.    HENT:                Nose normal.  No discharge.      Oral mucosa is moist.  No evidence of facial or scalp trauma.  Cardiovascular:  Normal rate, regular rhythm with normal S1 and S2.      DP pulses intact, capillary refill is normal in the lower extremities.  Pulmonary:  Effort normal and breath sounds clear to auscultation bilaterally     GI:    Soft. No distension and no mass. No tenderness.   Musculoskeletal:  Normal range of motion of her upper extremities.  No edema in the upper extremeties.  She does have some chronic pretibial trace edema into the ankles.  She has significant arthritic changes in both knees, both are tender to palpation but there is no redness.  They both hurt to move but the left is greater than the right.  She has no ankle pain and I can dorsi and plantarflex her feet without any tenderness.  I can also palpate her hips and there does not seem to be any tenderness.  She has a lot of tenderness over the pretibial areas where there is edema.  Neurological:   Alert and oriented. No focal weakness.  Normal  sensation in both legs.  Skin:    Skin is warm and dry. No rash noted.   Psychiatric:   Behavior is normal. Appropriate mood and affect.     Judgment and thought content normal.         Emergency Department Course       Imaging:  XR Knee Left 3 Views   Final Result   IMPRESSION: Advanced degenerative narrowing medial compartment left knee with bone-on-bone contact and osteophytic spurring. No evidence for fracture. No significant effusion.      XR Pelvis and Hip Bilateral 2 Views   Final Result   IMPRESSION: Advanced degenerative change at the right hip joint with bone-on-bone contact and osteophytic spurring. Less advanced degenerative narrowing left hip joint. Both hips negative for fracture or dislocation. Pelvis negative for fracture.         Report per radiology    Laboratory:  Labs Ordered and Resulted from Time of ED Arrival to Time of ED Departure   BASIC METABOLIC PANEL - Abnormal       Result Value    Sodium 132 (*)     Potassium 4.4      Chloride 95 (*)     Carbon Dioxide (CO2) 27      Anion Gap 10      Urea Nitrogen 27.0 (*)     Creatinine 0.66      Calcium 9.8 (*)     Glucose 118 (*)     GFR Estimate 77     CRP INFLAMMATION - Abnormal    CRP Inflammation 59.95 (*)    URIC ACID - Normal    Uric Acid 3.4     CBC WITH PLATELETS AND DIFFERENTIAL    WBC Count 8.7      RBC Count 3.95      Hemoglobin 12.5      Hematocrit 38.3      MCV 97      MCH 31.6      MCHC 32.6      RDW 14.3      Platelet Count 419      % Neutrophils 64      % Lymphocytes 19      % Monocytes 9      % Eosinophils 6      % Basophils 1      % Immature Granulocytes 1      NRBCs per 100 WBC 0      Absolute Neutrophils 5.7      Absolute Lymphocytes 1.6      Absolute Monocytes 0.8      Absolute Eosinophils 0.5      Absolute Basophils 0.1      Absolute Immature Granulocytes 0.1      Absolute NRBCs 0.0     ROUTINE UA WITH MICROSCOPIC REFLEX TO CULTURE            Emergency Department Course & Assessments:        Interventions:  Medications    traMADol (ULTRAM) tablet 50 mg (50 mg Oral $Given 4/1/23 9971)        Independent Interpretation (X-rays, CTs, rhythm strip):  I reviewed her knee and pelvis x-rays and see the severe arthritis in the left knee and right hip.    Consultations/Discussion of Management or Tests:  1833 I discussed the case with the hospitalist Dr. Kowalski       Social Determinants of Health affecting care:   None    Disposition:  The patient was admitted to the hospital under the care of Dr. Kowalski.     Impression & Plan      Medical Decision Making:  Patient comes in with inability to bear weight on her left leg today.  Had been getting worse recently she has bad arthritis in that knee on x-ray bone-on-bone.  She also has bad arthritis in the right hip but the left hip x-ray looks pretty good.  The patient was given a tramadol and still could not stand up because of the pain in the left knee.  Labs showed an increase in her CRP but uric acid is normal and the rest of her labs are essentially normal other than an increased BUN suggesting some mild dehydration.  Sodium was 132 and CBC is normal.  The patient is at assisted living and they cannot care for her there with the inability to walk and she is a fairly big lady.  I talked with Dr. Kowalski and we will bring this patient in observation for PT OT evaluation and social service consultation tomorrow along with orthopedics.  I think she will probably need a steroid injection in that knee.  There is no findings or history to suggest infected joint or gout at this time.  I think it is a flare of her arthritis.  I relayed this to the patient and her daughter and they are in agreement with coming in.      Diagnosis:    ICD-10-CM    1. Left knee pain, unspecified chronicity  M25.562       2. Primary osteoarthritis of left knee  M17.12       3. Primary osteoarthritis of right hip  M16.11            Discharge Medications:  New Prescriptions    No medications on file          Kassie Zambrano  MD Nir  4/1/2023   Kassie Loyola MD Powell, Tracy Alan, MD  04/01/23 8925

## 2023-04-01 NOTE — LETTER
Transition Communication Hand-off for Care Transitions to Next Level of Care Provider    Name: Ham Marie  : 1921  MRN #: [id number]  Primary Care Provider: Eduar Salinas     Primary Clinic: [address] [facility] [address]  [facility] [address]     Reason for Hospitalization:  Primary osteoarthritis of left knee [M17.12]  Primary osteoarthritis of right hip [M16.11]  Left knee pain, unspecified chronicity [M25.562]  Admit Date/Time: 2023  2:44 PM  Discharge Date: ***  Payor Source: Payor: MEDICA / Plan: [employer] / Product Type: HMO /     Readmission Assessment Measure (DEXTER) Risk Score/category: ***    Plan of Care Goals/Milestone Events:   Patient Concerns: ***   Patient Goals:   Short-term ***   Long-term ***   Medical Goals   Short-term ***   Long-term ***         Reason for Communication Hand-off Referral: {CCREASONCTNHANDOFFREFERRALCTS:85976}    Discharge Plan:  Discharge Plan:      Flowsheet Row Most Recent Value   Disposition Comments Anticpte return home   Concerns Comments Anticipte return to [facility]             Concern for non-adherence with plan of care:   Y/N ***  Discharge Needs Assessment:  Needs      Flowsheet Row Most Recent Value   Equipment Currently Used at Home walker, rolling            Already enrolled in Tele-monitoring program and name of program:  ***  Follow-up specialty is recommended: {YES / NO:30358}    Follow-up plan:    Future Appointments   Date Time Provider Department Center   2023 10:00 AM  PTA 1 STUDENT [facility]   2023  7:00 PM Jacqueline Kapadia OTR SHOT [facility]       Any outstanding tests or procedures:        Referrals       Future Labs/Procedures    Occupational Therapy Adult Consult     Comments:    Evaluate and treat as clinically indicated.    Reason:  weakness and deconditioning    Physical Therapy Adult Consult     Comments:    Evaluate and treat as clinically indicated.    Reason:  weakness and deconditioning               Key Recommendations:      Mallorie Paez RN    AVS/Discharge Summary is the source of truth; this is a helpful guide for improved communication of patient story

## 2023-04-01 NOTE — ED TRIAGE NOTES
Pt present via EMS, per EMS pt from [place]     Last night awoke with severe left leg and having trouble getting around this morning     Has right leg pain but this is chronic for pt    Staff denies fall or any recent trauma

## 2023-04-01 NOTE — LETTER
Transition Communication Hand-off for Care Transitions to Next Level of Care Provider    Name: Ham Marie  : 1921  MRN #: [id number]  Primary Care Provider: Eduar Salinas     Primary Clinic: [address] [facility] [address]  [facility] [address]     Reason for Hospitalization:  Primary osteoarthritis of left knee [M17.12]  Primary osteoarthritis of right hip [M16.11]  Left knee pain, unspecified chronicity [M25.562]  Admit Date/Time: 2023  2:44 PM  Discharge Date: ***  Payor Source: Payor: MEDICA / Plan: [employer] / Product Type: HMO /     Readmission Assessment Measure (DEXTER) Risk Score/category: ***    Plan of Care Goals/Milestone Events:   Patient Concerns: ***   Patient Goals:   Short-term ***   Long-term ***   Medical Goals   Short-term ***   Long-term ***         Reason for Communication Hand-off Referral: {CCREASONCTNHANDOFFREFERRALCTS:20747}    Discharge Plan:  Discharge Plan:      Flowsheet Row Most Recent Value   Disposition Comments Anticpte return home   Concerns Comments Anticipte return to [facility]             Concern for non-adherence with plan of care:   Y/N ***  Discharge Needs Assessment:  Needs      Flowsheet Row Most Recent Value   Equipment Currently Used at Home walker, rolling            Already enrolled in Tele-monitoring program and name of program:  ***  Follow-up specialty is recommended: {YES / NO:89934}    Follow-up plan:    Future Appointments   Date Time Provider Department Center   2023 10:00 AM  PTA 1 STUDENT [facility]   2023  7:00 PM Jacqueline Kapadia OTR SHOT [facility]       Any outstanding tests or procedures:        Referrals       Future Labs/Procedures    Occupational Therapy Adult Consult     Comments:    Evaluate and treat as clinically indicated.    Reason:  weakness and deconditioning    Physical Therapy Adult Consult     Comments:    Evaluate and treat as clinically indicated.    Reason:  weakness and deconditioning               Key Recommendations:      Mallorie Paez RN    AVS/Discharge Summary is the source of truth; this is a helpful guide for improved communication of patient story

## 2023-04-01 NOTE — H&P
[facility]    History and Physical - Hospitalist Service       Date of Admission:  4/1/2023    Assessment & Plan      Ham Marie is a 89 year old female with past medical history significant for osteoarthritis , prior DVT/PE, HTN, Hypothyroidism admitted on 4/1/2023 with R lower extremity pain.     Left lower extremity pain   Hx of severe bilateral osteoarthritis   The patient has underlying osteoarthritis and has been having issues with her RLE for awhile. Today however she woke with worsening left left pain. The patient is normally able to ambulate with a walker, but today is unable to bear weight from the pain. She has difficulty pinpointing where the pain is located and just states the whole leg. Pain is severe with any kind of movement.   *XR of the right knee showed advanced degenerative narrowing of the medial compartment with bone on bone contact and osteophytic spurring. No evidence for fracture. No Effusion.   *XR of the hip was also obtained and showed advanced degenerative change with bone on bone contact at the right hip joint. There is no evidence of fracture.   * Laboratory evaluation is notable for elevated CRP to 59.95. No leukocytosis. No redness or swelling of the knee joint to suggest infection or gout etc.   - Admit to observation   - Pain control as needed   - Will consult orthopedics - defer additional imaging to ortho. May consider local steroid injection of the joint.   - PT consult   - CC/SW as pt may need TCU.     Hx of DVT/PE   - Continue PTA apixaban     Hypothyroidism  - Continue PTA levothyroxine     HTN  - Continue PTA metoprolol     Diet: Regular Diet Adult    DVT Prophylaxis: Pneumatic Compression Devices  Barney Catheter: Not present  Lines: None     Cardiac Monitoring: None  Code Status: DNR/DNI per POLST    Clinically Significant Risk Factors Present on Admission               # Drug Induced Coagulation Defect: home medication list includes an  anticoagulant medication                 Disposition Plan      Expected Discharge Date: 04/02/2023                  Tennille Kowalski MD  Hospitalist Service  [facility]  Securely message with [employer] (more info)  Text page via [employer] Paging/Directory     ______________________________________________________________________    Chief Complaint   LLE pain     History is obtained from the patient    History of Present Illness   Ham Marie is a 89 year old female who presents to the ED with LLE pain. She has long standing osteoarthritis of bilateral knee and hip joints. Normally her right leg gives her more trouble but since last evening she has been having severe left sided leg pain. She is normally able to walk fine with a walker, but today is unable to bear weight at all due to severe pain. She has difficulty pinpointing where the pain is located and just states the whole leg. When she is not moving she does not have any pain. She has some baseline lower extremity edema, which is stable and symmetric     Past Medical History    Past Medical History:   Diagnosis Date     Actinic keratoses      Arthritis      Carpal tunnel syndrome     right     DVT (deep venous thrombosis) (H) 2000    right      Heart murmur      Hypertension      Other and unspecified hyperlipidemia      Pulmonary embolism (H) 2000     Unspecified hypothyroidism        Past Surgical History   Past Surgical History:   Procedure Laterality Date     APPENDECTOMY       ARTHROSCOPY KNEE WITH DEBRIDEMENT JOINT, COMBINED  5/13/2014    Procedure: ARTHROSCOPY KNEE WITH DEBRIDEMENT JOINT;  Surgeon: Missael Packer MD;  Location: [facility] TOTAL ABDOM HYSTERECTOMY      Hysterectomy, Total Abdominal       Prior to Admission Medications   Prior to Admission Medications   Prescriptions Last Dose Informant Patient Reported? Taking?   Multiple Vitamins-Minerals (PRESERVISION AREDS PO)   Yes No   Sig: Take 1 tablet by mouth 2  times daily   acetaminophen (TYLENOL) 325 MG tablet   No No   Sig: Take 3 tablets (975 mg) by mouth every 8 hours as needed for mild pain   Patient taking differently: Take 975 mg by mouth 2 times daily And daily PRN   apixaban ANTICOAGULANT (ELIQUIS) 5 MG tablet   Yes No   Sig: Take 5 mg by mouth 2 times daily   calcium carbonate (OSCAL 500) 1250 (500 CA) MG TABS   Yes No   Sig: Take 1 tablet (1,250 mg) by mouth 2 times daily   cholecalciferol (VITAMIN D) 1000 UNIT tablet   No No   Sig: Take 1 tablet (1,000 Units) by mouth daily   diclofenac (VOLTAREN) 1 % topical gel   No No   Sig: Apply 4 g topically 4 times daily   levothyroxine (SYNTHROID/LEVOTHROID) 50 MCG tablet   Yes No   Sig: Take 50 mcg by mouth daily   magnesium 250 MG tablet   Yes No   Sig: Take 1 tablet by mouth daily.   metoprolol tartrate (LOPRESSOR) 25 MG tablet   Yes No   Sig: Take 25 mg by mouth 2 times daily   mirabegron (MYRBETRIQ) 50 MG 24 hr tablet   No No   Sig: Take 1 tablet (50 mg) by mouth daily   vitamin C (ASCORBIC ACID) 500 MG tablet   Yes No   Sig: Take 500 mg by mouth daily      Facility-Administered Medications: None        Review of Systems    The 10 point Review of Systems is negative other than noted in the HPI or here.      Physical Exam   Vital Signs: Temp: 98.1  F (36.7  C) Temp src: Oral BP: (!) 165/142 Pulse: 67   Resp: 16 SpO2: 93 %      Weight: 0 lbs 0 oz    Constitutional: Awake, alert, cooperative, no apparent distress.  Eyes: Conjunctiva and pupils examined and normal.  HEENT: Moist mucous membranes, normal dentition.  Respiratory: Non labored  Cardiovascular: Regular rate and rhythm  GI: Soft, non-distended, non-tender, normal bowel sounds.  Skin: No rashes, no cyanosis, 1+ bilateral lower extremity edema.  Musculoskeletal: No joint swelling, erythema  She is tender to palpation all over bilateral lower extremities. LLE ROM is limited due to pain.   Neurologic: Cranial nerves 2-12 intact, normal strength and  sensation.  Psychiatric: Alert, oriented to person, place and time, no obvious anxiety or depression.      Medical Decision Making       90 MINUTES SPENT BY ME on the date of service doing chart review, history, exam, documentation & further activities per the note.      Data     I have personally reviewed the following data over the past 24 hrs:    8.7  \   12.5   / 419     132 (L) 95 (L) 27.0 (H) /  118 (H)   4.4 27 0.66 \       Procal: N/A CRP: 59.95 (H) Lactic Acid: N/A         Imaging results reviewed over the past 24 hrs:   Recent Results (from the past 24 hour(s))   XR Pelvis and Hip Bilateral 2 Views    Narrative    EXAM: XR PELVIS AND HIP BILATERAL 2 VIEWS  LOCATION: [facility]  DATE/TIME: 4/1/2023 3:59 PM    INDICATION: Pain in the hips, left greater than right but no history of trauma. Difficulty bearing weight.  COMPARISON: None.      Impression    IMPRESSION: Advanced degenerative change at the right hip joint with bone-on-bone contact and osteophytic spurring. Less advanced degenerative narrowing left hip joint. Both hips negative for fracture or dislocation. Pelvis negative for fracture.   XR Knee Left 3 Views    Narrative    EXAM: XR KNEE LEFT 3 VIEWS  LOCATION: [facility]  DATE/TIME: 4/1/2023 3:59 PM    INDICATION: pain,  no trauma, obvious degenerative joint disease, cannot bear weight on left leg, no fever or chills.  COMPARISON: None.      Impression    IMPRESSION: Advanced degenerative narrowing medial compartment left knee with bone-on-bone contact and osteophytic spurring. No evidence for fracture. No significant effusion.

## 2023-04-02 NOTE — PROGRESS NOTES
[facility]    Medicine Progress Note - Hospitalist Service    Date of Admission:  4/1/2023    Assessment & Plan   Ham Marie is a 89 year old female with past medical history significant for osteoarthritis , prior DVT/PE, HTN, Hypothyroidism admitted on 4/1/2023 with R lower extremity pain.      Left lower extremity pain   Hx of severe bilateral osteoarthritis   The patient has underlying osteoarthritis and has been having issues with her RLE for awhile. Today however she woke with worsening left left pain. The patient is normally able to ambulate with a walker, but today is unable to bear weight from the pain. She has difficulty pinpointing where the pain is located and just states the whole leg. Pain is severe with any kind of movement.   *XR of the right knee showed advanced degenerative narrowing of the medial compartment with bone on bone contact and osteophytic spurring. No evidence for fracture. No Effusion.   *XR of the hip was also obtained and showed advanced degenerative change with bone on bone contact at the right hip joint. There is no evidence of fracture.   * Laboratory evaluation is notable for elevated CRP to 59.95--60.42. No leukocytosis. No redness or swelling of the knee joint to suggest infection or gout etc.   - Admit to observation   - Pain control as needed   - Will consult orthopedics - will order XR lumbar spine, but defer additional imaging to ortho.  Last admission patient deferred cortisone injection for knee pain  - PT consult   - CC/SW as pt may need TCU.      Hx of DVT/PE   - Continue PTA apixaban      Hypothyroidism  - Continue PTA levothyroxine      HTN  - Continue PTA metoprolol      Chronic sacral wound, previously unstageable  -WOC consult tomorrow - exam limited today due to LE pain, but discussed with nurse, Fara and charge RN and will try to assess as able.  -off loading, mobilization as needed     Diet: Regular Diet Adult    DVT  Prophylaxis: Pneumatic Compression Devices  Barney Catheter: Not present  Lines: None     Cardiac Monitoring: None  Code Status: No CPR- Do NOT Intubate      Clinically Significant Risk Factors Present on Admission               # Drug Induced Coagulation Defect: home medication list includes an anticoagulant medication                 Disposition Plan      Expected Discharge Date: 04/03/2023        Discharge Comments: unable to ambulate.   Ortho consult  PT/SW/CC        The patient's care was discussed with the Attending Physician, Dr. Abad, Care Coordinator/, Patient and Charge RN.    Rody Sloan PA-C  Hospitalist Service  [facility]  Securely message with [employer] (more info)  Text page via [facility] Paging/Directory   ______________________________________________________________________    Interval History   Doing well. No pain when not moving. Describes generalized pain in both left with left being now worse than right.  Cannot localize. No nausea, vomiting or back pain. No recent falls or trauma. No radicular symptoms noted. States ongoing but has gotten worse making it hard for her to ambulate.    Chart review indicates admission in late December which required subsequent TCU stay.  Pain felt to be acute on chronic due to osteoarthritis.    Physical Exam   Vital Signs: Temp: 98  F (36.7  C) Temp src: Oral BP: (!) 155/84 Pulse: 60   Resp: 18 SpO2: 94 % O2 Device: None (Room air)    Weight: 0 lbs 0 oz    General Appearance: A&O x3. NAD, resting in bed  Respiratory: CTA anteriorly no accessory muscle use  Cardiovascular: rrr s1 s2, chronic lower extremity lymphedema at baseline per patient (+1)  GI: soft, ND, NT, bs present  Skin: no rashes, chronic sacral wound noted previously but not able to be examined, no erythema or joint swelling noted  Other: PARR but pain in bilateral lower extremities, no focal neurologic deficits, answers questions apporpriately      Medical Decision Making       40 MINUTES SPENT BY ME on the date of service doing chart review, history, exam, documentation & further activities per the note.      Data     I have personally reviewed the following data over the past 24 hrs:    8.7  \   12.5   / 419     132 (L) 95 (L) 27.0 (H) /  118 (H)   4.4 27 0.66 \       Procal: N/A CRP: 60.42 (H) Lactic Acid: N/A         Imaging results reviewed over the past 24 hrs:   Recent Results (from the past 24 hour(s))   XR Pelvis and Hip Bilateral 2 Views    Narrative    EXAM: XR PELVIS AND HIP BILATERAL 2 VIEWS  LOCATION: [facility]  DATE/TIME: 4/1/2023 3:59 PM    INDICATION: Pain in the hips, left greater than right but no history of trauma. Difficulty bearing weight.  COMPARISON: None.      Impression    IMPRESSION: Advanced degenerative change at the right hip joint with bone-on-bone contact and osteophytic spurring. Less advanced degenerative narrowing left hip joint. Both hips negative for fracture or dislocation. Pelvis negative for fracture.   XR Knee Left 3 Views    Narrative    EXAM: XR KNEE LEFT 3 VIEWS  LOCATION: [facility]  DATE/TIME: 4/1/2023 3:59 PM    INDICATION: pain,  no trauma, obvious degenerative joint disease, cannot bear weight on left leg, no fever or chills.  COMPARISON: None.      Impression    IMPRESSION: Advanced degenerative narrowing medial compartment left knee with bone-on-bone contact and osteophytic spurring. No evidence for fracture. No significant effusion.

## 2023-04-02 NOTE — PROGRESS NOTES
Shift: 1509-5838  Orientation/Cognitive: AOx4  Observation Goals (Met/ Not Met): not met.  Mobility Level/Assist Equipment: unable to walk due to severe pain with simple movement of lower extremities.  Fall Risk (Y/N): yes  Behavior Concerns: calm and cooperative.  Pain Management: denies pain when lying still, but pain goes up to 10/10 with simple lower extremities movement.  Tele/VS/O2: VS stable and on room air; BP in 150s systolic and 80s diastolic.  ABNL Lab/BG: CRP - pending collection; UA - pending unit collection.  Diet: regular  Bowel/Bladder: incontinent bladder - on external catheter.  Skin Concerns: non-blanchable redness on left buttock cheek - placed foam dressing.  Drains/Devices: PIV SL  Tests/Procedures for next shift: none  Anticipated DC date & active delays: TBD, awaiting Ortho surgery consult.      Observation Goals:    -diagnostic tests and consults completed and resulted - in progress.    -vital signs normal or at patient baseline - VS stable and on room ai; /83    -adequate pain control on oral analgesics - denies pain on lower extremities when lying still, but hurts 10/10 pain with activity or movement.    -returns to baseline functional status - not yet, still unable to walk due to pain.    -safe disposition plan has been identified - in progress, awaiting Ortho surgery consult.

## 2023-04-02 NOTE — PLAN OF CARE
Goal Outcome Evaluation:      Plan of Care Reviewed With: patient    Overall Patient Progress: improvingOverall Patient Progress: improving       Orientation/Cognitive: A&Ox4  Observation Goals (Met/ Not Met): not met.  Mobility Level/Assist Equipment: a1gbw  Fall Risk (Y/N): yes  Behavior Concerns: calm & cooperative.  Pain Management: denies pain  Tele/VS/O2: VSS  ABNL Lab/BG: see results  Diet: regular-needs assistance to eat & takes her meds whole in applesauce  Bowel/Bladder: incont bladder-purewick, +BM  Skin Concerns: non-blanchable redness on left buttock, foam dressing.  Drains/Devices: PIV SL  Tests/Procedures for next shift: wound consult Mon, Ortho consult  Anticipated DC date & active delays: TBD, awaiting Ortho surgery consult.        Observation Goals:     -diagnostic tests and consults completed and resulted -Not Met     -vital signs normal or at patient baseline -Met     -adequate pain control on oral analgesic-Met     -returns to baseline functional status -Not Met     -safe disposition plan has been identified - Not Met

## 2023-04-02 NOTE — PROGRESS NOTES
Observation Goals:    -diagnostic tests and consults completed and resulted - in progress.    -vital signs normal or at patient baseline - VS stable and on room ai; /79    -adequate pain control on oral analgesics - denies pain on lower extremities when lying still, but hurts 10/10 pain with activity or movement.    -returns to baseline functional status - not yet, still unable to walk due to pain.    -safe disposition plan has been identified - in progress, awaiting Ortho surgery consult.

## 2023-04-02 NOTE — PROGRESS NOTES
Observation goals  PRIOR TO DISCHARGE        Comments: -diagnostic tests and consults completed and resulted - not met  -vital signs normal or at patient baseline - met  -adequate pain control on oral analgesics - met  -returns to baseline functional status - not met  -safe disposition plan has been identified - not met  Nurse to notify provider when observation goals have been met and patient is ready for discharge.

## 2023-04-02 NOTE — PROGRESS NOTES
SW:  Patient was admitted from [facility]. Writer attempted to call to determine level of services. Writer was able to call [facility] nursing station at [phone]. Writer was informed by the Aide that there is not anyone that can confirm level of services and writer needs to call back 4/3. Writer will watch for discharge recommendations and continue to follow.     TY Flores, SW   Social Work   [facility]

## 2023-04-02 NOTE — PROGRESS NOTES
Observation goals  PRIOR TO DISCHARGE        Comments: -diagnostic tests and consults completed and resulted Not Met  -vital signs normal or at patient baseline Met  -adequate pain control on oral analgesics Met  -returns to baseline functional status Not Met  -safe disposition plan has been identified Not Met  Nurse to notify provider when observation goals have been met and patient is ready for discharge.

## 2023-04-02 NOTE — PHARMACY-ADMISSION MEDICATION HISTORY
Pharmacy Medication History  Admission medication history interview status for the 4/1/2023  admission is complete. See EPIC admission navigator for prior to admission medications     Location of Interview: Outside patient room but on unit  Medication history sources: Patient's family/friend (Daughter, Shivani), Surescripts and Care Everywhere    Significant changes made to the medication list:  Added:  -Escitalopram 10 mg daily    In the past week, patient estimated taking medication this percent of the time: greater than 90%    Medication Affordability: No concerns       Medication reconciliation completed by provider prior to medication history? No    Time spent in this activity: 30 minutes    Prior to Admission medications    Medication Sig Last Dose Taking? Auth Provider Long Term End Date   acetaminophen (TYLENOL) 500 MG tablet Take 1,000 mg by mouth 3 times daily And once daily prn 4/1/2023 at am Yes Unknown, Entered By History     apixaban ANTICOAGULANT (ELIQUIS) 5 MG tablet Take 5 mg by mouth 2 times daily 4/1/2023 at am Yes Unknown, Entered By History     calcium carbonate (OSCAL 500) 1250 (500 CA) MG TABS Take 1 tablet (1,250 mg) by mouth 2 times daily 4/1/2023 at am Yes Cuba Marie MD     cholecalciferol (VITAMIN D) 1000 UNIT tablet Take 1 tablet (1,000 Units) by mouth daily 4/1/2023 at am Yes Cuba Marie MD     diclofenac (VOLTAREN) 1 % topical gel Apply 4 g topically 4 times daily 4/1/2023 at am Yes Sinan Vincent,      escitalopram (LEXAPRO) 10 MG tablet Take 10 mg by mouth daily 4/1/2023 at am Yes Unknown, Entered By History Yes    levothyroxine (SYNTHROID/LEVOTHROID) 50 MCG tablet Take 50 mcg by mouth daily 4/1/2023 at am Yes Unknown, Entered By History Yes    magnesium 250 MG tablet Take 1 tablet by mouth daily. 3/31/2023 at pm Yes Reported, Patient     metoprolol tartrate (LOPRESSOR) 25 MG tablet Take 25 mg by mouth 2 times daily 4/1/2023 at am  Yes Unknown, Entered By History Yes    mirabegron (MYRBETRIQ) 50 MG 24 hr tablet Take 1 tablet (50 mg) by mouth daily 3/31/2023 at pm Yes Cuba Marie MD     Multiple Vitamins-Minerals (PRESERVISION AREDS PO) Take 1 tablet by mouth 2 times daily 4/1/2023 at am Yes Reported, Patient     vitamin C (ASCORBIC ACID) 500 MG tablet Take 500 mg by mouth daily 4/1/2023 at am Yes Unknown, Entered By History         The information provided in this note is only as accurate as the sources available at the time of update(s)

## 2023-04-02 NOTE — PROGRESS NOTES
RECEIVING UNIT ED HANDOFF REVIEW    ED Nurse Handoff Report was reviewed by: Anita Figueroa RN on April 1, 2023 at 7:52 PM

## 2023-04-03 NOTE — PROGRESS NOTES
Shift: 8768-3726  Orientation/Cognitive: AOx4  Observation Goals (Met/ Not Met): partially met, see notes below.  Mobility Level/Assist Equipment: A1 GBW  Fall Risk (Y/N): yes  Behavior Concerns: calm and cooperative  Pain Management: denies pain  Tele/VS/O2: VS stable and on room air  ABNL Lab/BG: no labs taken  Diet: regular  Bowel/Bladder: incontinent BB  Skin Concerns: non-blanchable purplish color on left buttock cheek.  Drains/Devices: PIV SL  Tests/Procedures for next shift: none  Anticipated DC date & active delays: TBD, awaiting Ortho surgery and WOC consult.      Observation Goals:    -diagnostic tests and consults completed and resulted - in progress.    -vital signs normal or at patient baseline - VS stable and on room air.    -adequate pain control on oral analgesics - denies pain on lower extremities when lying still.    -returns to baseline functional status - not yet, still unable to walk due to pain.    -safe disposition plan has been identified - in progress, awaiting Ortho surgery consult.

## 2023-04-03 NOTE — UTILIZATION REVIEW
Concurrent stay review; Secondary Review Determination      Under the authority of the Utilization Management Committee, the utilization review process indicated a secondary review on the above patient. The review outcome is based on review of the medical records, discussions with staff, and applying clinical experience noted on the date of the review.      (x) Observation Status Appropriate - Concurrent stay review      RATIONALE FOR DETERMINATION:      89 year old female with past medical history significant for osteoarthritis , prior DVT/PE, HTN, Hypothyroidism admitted on 4/1/2023 with R lower extremity pain.     The patient was evaluated by orthopedic surgery who recommend non-surgical management.    SW consulted to assist with disposition    As the patient is hospitalized primarily for pain management and difficulty mobilizing likely due to chronic medical issue (OA), continued observation status appears most appropriate.       Patient is clinically improving and there is no clear indication to change patient's status to inpatient. The severity of illness, intensity of service provided, expected LOS and risk for adverse outcome make the care appropriate for observation.      The information on this document is developed by the utilization review team in order for the business office to ensure compliance. This only denotes the appropriateness of proper admission status and does not reflect the quality of care rendered.   The definitions of Inpatient Status and Observation Status used in making the determination above are those provided in the CMS Coverage Manual, Chapter 1 and Chapter 6, section 70.4.      Sincerely,      Ori Mccain MD  Utilization Review   Physician Advisor   [facility].

## 2023-04-03 NOTE — PROGRESS NOTES
[facility]    Medicine Progress Note - Hospitalist Service    Date of Admission:  4/1/2023    Assessment & Plan      Ham Marie is a 89 year old female with past medical history significant for osteoarthritis , prior DVT/PE, HTN, Hypothyroidism admitted on 4/1/2023 with R lower extremity pain.     Left lower extremity pain   Hx of severe bilateral osteoarthritis   The patient has underlying osteoarthritis and has been having issues with her RLE for awhile. Today however she woke with worsening left left pain. The patient is normally able to ambulate with a walker, but today is unable to bear weight from the pain. She has difficulty pinpointing where the pain is located and just states the whole leg. Pain is severe with any kind of movement.   *XR of the right knee showed advanced degenerative narrowing of the medial compartment with bone on bone contact and osteophytic spurring. No evidence for fracture. No Effusion.   *XR of the hip was also obtained and showed advanced degenerative change with bone on bone contact at the right hip joint. There is no evidence of fracture.   * Laboratory evaluation is notable for elevated CRP to 59.95. No leukocytosis. No redness or swelling of the knee joint to suggest infection or gout etc.   - Admit to observation   - Pain control as needed   - Orthopedics evaluated.Recommend adding diclofenac cream and oral steroids. Patient states that she has not had good relief with injections.   - PT consult   - CC/SW as pt may need TCU.     Hx of DVT/PE   - Continue PTA apixaban     Hypothyroidism  - Continue PTA levothyroxine     HTN  - Continue PTA metoprolol        Diet: Regular Diet Adult Other - please comment    DVT Prophylaxis: Pneumatic Compression Devices  Barney Catheter: Not present  Lines: None     Cardiac Monitoring: None  Code Status: No CPR- Do NOT Intubate      Clinically Significant Risk Factors Present on Admission               # Drug  Induced Coagulation Defect: home medication list includes an anticoagulant medication                 Disposition Plan      Expected Discharge Date: 04/04/2023      Destination: assisted living  Discharge Comments: Ortho to see  WOC for chronic sacral wound  SW following      Patient may be able to return to assisted living facility; awaiting more information regarding care needs.     The patient's care was discussed with the Attending Physician, Dr. Sanchez, Bedside Nurse and Patient.    Asiya Abbott NP  Hospitalist Service  [facility]  Securely message with [employer] (more info)  Text page via [employer] Paging/Directory   ______________________________________________________________________    Interval History   No acute events noted overnight. Continues to have severe pain with any movement but is quite comfortable when sitting and does sleep well. States her appetite is ok but she prefers liquids to solids.     Physical Exam   Vital Signs: Temp: 97.3  F (36.3  C) Temp src: Oral BP: (!) 149/72 Pulse: 57   Resp: 17 SpO2: 97 % O2 Device: None (Room air)    Weight: 0 lbs 0 oz    General Appearance: Alert, in NAD  Respiratory: chest symmetric with easy respirations bilaterally. Lung sounds clear and equal.   Cardiovascular: Normal S1/S2  GI: soft, non-distended and non-tender.  Skin: warm, dry  Other: mild tenderness with palpation of knees; no warmth or swelling noted.      Medical Decision Making       30 MINUTES SPENT BY ME on the date of service doing chart review, history, exam, documentation & further activities per the note.      Data         Imaging results reviewed over the past 24 hrs:   No results found for this or any previous visit (from the past 24 hour(s)).

## 2023-04-03 NOTE — PROGRESS NOTES
Observation goals  PRIOR TO DISCHARGE        Comments: -diagnostic tests and consults completed and resulted - not met  -vital signs normal or at patient baseline - met  -adequate pain control on oral analgesics - met  -returns to baseline functional status - not met  -safe disposition plan has been identified - not met  Nurse to notify provider when observation goals have been met and patient is ready for discharge.     Orientation/Cognitive: AOX4. [place]  Observation Goals (Met/ Not Met): not met  Mobility Level/Assist Equipment: AX1 gb/walker. T/R. Sat in chair this shift.  Fall Risk (Y/N): yes  Behavior Concerns: none  Pain Management: Scheduled tylenol.   Tele/VS/O2: VSS on RA. No tele  ABNL Lab/BG: Na- 132. CRP infl- 60.42  Diet: Reg diet. Need assist with feeding and ordering meal. Takes meds whole in applesauce.  Bowel/Bladder: Incontinent of bladder. Incontinence care given. Purewick in place.  Skin Concerns: Chronic sacral pressure injury- purplish color- mapilex placed.  Drains/Devices: PIV SL  Tests/Procedures for next shift: Ortho surgery and Wound nurse consult pending.   Anticipated DC date & active delays: Needs PT consult. SW following for discharge placement plan.

## 2023-04-03 NOTE — CONSULTS
Care Management Initial Consult    General Information  Assessment completed with: Care Team Member, LEENA Vivas  Type of CM/SW Visit: Initial Assessment    Primary Care Provider verified and updated as needed: Yes   Readmission within the last 30 days: no previous admission in last 30 days      Reason for Consult: discharge planning  Advance Care Planning: Advance Care Planning Reviewed: present on chart, no concerns identified          Communication Assessment  Patient's communication style: spoken language (English or Bilingual)    Hearing Difficulty or Deaf: yes        Cognitive  Cognitive/Neuro/Behavioral: WDL                      Living Environment:   People in home: facility resident  Alone  Current living Arrangements: assisted living  Name of Facility: [facility]   Able to return to prior arrangements: yes  Living Arrangement Comments: Anticipte return to Assisted Living    Family/Social Support:  Care provided by: self, other (see comments) (Facility Staff)  Provides care for: no one, unable/limited ability to care for self  Marital Status:   Children, Facility resident(s)/Staff          Description of Support System: Supportive, Involved    Support Assessment: Adequate family and caregiver support, Adequate social supports    Current Resources:   Patient receiving home care services: No     Community Resources: None  Equipment currently used at home:    Supplies currently used at home: None    Employment/Financial:  Employment Status: retired        Financial Concerns:     Referral to Financial Worker: No  Finance Comments: [employer]    Lifestyle & Psychosocial Needs:  Social Determinants of Health     Tobacco Use: Low Risk  (1/24/2023)    Patient History      Smoking Tobacco Use: Never      Smokeless Tobacco Use: Never      Passive Exposure: Not on file   Alcohol Use: Not on file   Financial Resource Strain: Not on file   Food Insecurity: Not on file    Transportation Needs: Not on file   Physical Activity: Not on file   Stress: Not on file   Social Connections: Not on file   Intimate Partner Violence: Not on file   Depression: Not at risk (1/15/2019)    PHQ-2      PHQ-2 Score: 0   Housing Stability: Not on file       Functional Status:  Prior to admission patient needed assistance:   Dependent ADLs:: Ambulation-walker, Bathing, Dressing, Wheelchair-with assist  Dependent IADLs:: Cleaning, Cooking, Laundry, Shopping, Medication Management, Transportation  Assesssment of Functional Status: At functional baseline    Mental Health Status:  Mental Health Status: No Current Concerns       Chemical Dependency Status:  Chemical Dependency Status: No Current Concerns             Values/Beliefs:  Spiritual, Cultural Beliefs, Faith Practices, Values that affect care: no               Additional Information:  Per care management consult for discharge planning, chart was reviewed. Patient is a very pleasant 89 year old female with a past medical history of osteoarthritis , prior DVT/PE, HTN, Hypothyroidism. Patient presents to [facility] ER on 4/1/23 via EMS for R lower extremity Pain from [facility]. Writer called [facility] and spoke with patient's nurse, Ortega. Writer was informed that patient has been a resident since January 26th, 2023. Patient resides in the [facility] and receives assist X 1 for showering, bathroom assist x 1 (6 times daily), meals provided, medication set up/admin and assistance with dressing. Patient is able to ambulate with a walker at baseline in her apartment/short distances and uses a wheelchair to do down for meals with Aide assist. Patient has meals set up for her but is able to eat on her own. Ortega confirmed patient's PCP. Ortega is asking that when a discharge plan is identified they are made aware. Orders could be faxed to [facility] at [phone]. Writer asked [facility] to put a consult for physical therapy in to  determine if patient is at her baseline and can return. Care management team will watch for discharge orders.     TY Flores, [facility]   Social Work   [facility]

## 2023-04-03 NOTE — PROGRESS NOTES
Observation goals  PRIOR TO DISCHARGE        Comments:   -diagnostic tests and consults completed and resulted: not met    -vital signs normal or at patient baseline: met    -adequate pain control on oral analgesics: met    -returns to baseline functional status: not met    -safe disposition plan has been identified: not met, PT and ortho surgery consult   Nurse to notify provider when observation goals have been met and patient is ready for discharge.

## 2023-04-03 NOTE — PLAN OF CARE
Goal Outcome Evaluation:      Plan of Care Reviewed With: patient, family    Overall Patient Progress: improvingOverall Patient Progress: improving    Shift:[id number] 4/3/23  Summary: increased pain in BLE knees  Orientation/Cognitive:A/Ox4, [place]  Observation Goals (Met/ Not Met): not met   Mobility Level/Assist Equipment:A1-2 GB walker   Fall Risk (Y/N):Yes  Behavior Concerns: calm and cooperative   Pain Management:Lodicaine patches to bilateral knees, Voltaren gel applied, and scheduled tylenol   Tele/VS/O2:VSS on room air   ABNL Lab/BG: CRP 60.42, Na 132  Diet:regular   Bowel/Bladder: incontinent at times   Skin Concerns: purplish nonblanchable to sacrum, mepliex in place    Drains/Devices:IV SL  Tests/Procedures for next shift: pending PT consult   Anticipated DC date & active delays: pending improvement   Patient Stated Goal for Today: to feel better  Other important info:

## 2023-04-03 NOTE — CONSULTS
"[facility]    Orthopedic Consultation    Ham Marie MRN# [id number]   Age: 89 year old YOB: 1921     Date of Admission: 4/1/2023    Reason for consult: Bilateral knee pain       Requesting physician: Tennille Kowalski MD       Level of consult: One-time consult to assist in determining a diagnosis, recommend an appropriate treatment plan and place orders           Assessment and Plan:   Assessment:   1. Advanced bilateral knee osteoarthritis with possible underlying crystalline arthropathy (pseudogout)  2. Bilateral hip osteoarthritis  3. Possible left greater trochanteric bursitis      Plan:   The patient's history and clinical/diagnostic findings were reviewed with the on-call orthopedic trauma surgeon, Dr. Ben García. Patient reports experiencing acute on chronic bilateral knee pain for several years secondary to advanced osteoarthritis. She reports having multiple cortisone injections for her knees without benefit. During her previous admission in December 2022, orthopedics was consulted and patient declined evaluation as her \"knees were fine.\" Currently, the patient reports symptoms with ROM more so than weight bearing. No interval trauma/falls. Updated radiographs of her left knee demonstrate advanced degenerative changes and no obvious bony abnormalities. Prior right knee radiographs demonstrate advanced degenerative changes and chondrocalcinosis. There are also degenerative changes to the bilateral hip joints and lumbar spine. CRP elevated at 60.42, WBC WNL, uric acid WNL. Exam is not concerning for bilateral knee septic joints at this time. She also has tenderness over the left GTB. Discussed nonoperative and operative options for advanced bilateral knee DJD. Patient is not at all interested in surgical intervention, which would not be recommended at her age regardless. She also would like to defer cortisone injections as these have not been helpful in the " past. Current recommendations as follows:    -No indication for further imaging or diagnostic arthrocentesis at this time.  -Discussed the use of systemic steroids with the patient and her hospitalist, Dr. Sanchez. Patient has no known history of diabetes. Will defer dosing of steroids to hospital team.  -Defer recommendations regarding to lumbar DDD to neurosurgery if applicable. Do not suspect radiculopathy at this time per my exam.  -Continue with pain regimen, maximizing non-opioids and topical pain relievers. Diclofenac and lidocaine patches ordered.  -Continue with cold compresses. Consider Ace wrap to bilateral knees.   -WBAT BLE with walker and SBA.  -Mobilize with PT/OT.  -Dispo per primary team, but may require TCU.  -Follow up with Dr. Ben García at [facility] (phone: [phone]) for consideration of cortisone injections should the patient be interested in these in the future. Orthopedics will follow for the next couple of days to see if she benefits from PO steroids.     Please contact orthopedic trauma team if any questions or concerns arise.           Chief Complaint:   Bilateral knee pain         History of Present Illness:   Medical history obtained via chart review and discussion with the patient. Ham Marie is a 89 year old female with past medical history of osteoarthritis, previous DVT and PE on Eliquis, HTN, and hypothyroidism who was admitted on 4/1/23 for right lower extremity pain resulting in difficulty ambulating. She denies known trauma or recent falls. Patient has a known history of advanced bilateral knee osteoarthritis for which she has received periodic cortisone injections in the past. She states that these were never helpful. Currently, the patient states that her right knee pain is worse than her left. She states that her pain is mostly present with ROM of the bilateral knees. She states that she does okay walking from a pain standpoint, but states that walking  has been limited and she is mostly transferring with the use of a walker and assist from nursing staff. Patient denies instability, cracking, popping, and catching of the bilateral knees. Denies bilateral hip or low back pain. No numbness of the bilateral lower extremities. Patient's primary concern is of nocturnal urinary frequency (chronic issue) rather than her knees. Her concerns were relayed to nursing staff and Dr. Sanchez. Denies prior injuries or surgeries to her bilateral knees. The patient uses a walker for ambulation at baseline. She resides in an assisted living facility.           Past Medical History:     Past Medical History:   Diagnosis Date     Actinic keratoses      Arthritis      Carpal tunnel syndrome     right     DVT (deep venous thrombosis) (H) 2000    right      Heart murmur      Hypertension      Other and unspecified hyperlipidemia      Pulmonary embolism (H) 2000     Unspecified hypothyroidism              Past Surgical History:     Past Surgical History:   Procedure Laterality Date     APPENDECTOMY       ARTHROSCOPY KNEE WITH DEBRIDEMENT JOINT, COMBINED  5/13/2014    Procedure: ARTHROSCOPY KNEE WITH DEBRIDEMENT JOINT;  Surgeon: Missael Packer MD;  Location: [facility] TOTAL ABDOM HYSTERECTOMY      Hysterectomy, Total Abdominal             Social History:     Social History     Tobacco Use     Smoking status: Never     Smokeless tobacco: Never   Vaping Use     Vaping status: Not on file   Substance Use Topics     Alcohol use: Yes     Alcohol/week: 0.0 standard drinks of alcohol     Comment: rarely             Family History:     Family History   Problem Relation Age of Onset     C.A.D. No family hx of      Breast Cancer No family hx of      Cancer - colorectal No family hx of              Immunizations:     VACCINE/DOSE   Diptheria   DPT   DTAP   HBIG   Hepatitis A   Hepatitis B   HIB   Influenza   Measles   Meningococcal   MMR   Mumps   Pneumococcal   Polio   Rubella   Small Pox   TDAP    Varicella   Zoster             Allergies:     Allergies   Allergen Reactions     Erythromycin      upsets stomach     Zocor [Simvastatin - High Dose] Rash             Medications:     Current Facility-Administered Medications   Medication     acetaminophen (TYLENOL) tablet 975 mg     apixaban ANTICOAGULANT (ELIQUIS) tablet 5 mg     escitalopram (LEXAPRO) tablet 10 mg     levothyroxine (SYNTHROID/LEVOTHROID) tablet 50 mcg     melatonin tablet 1 mg     metoprolol tartrate (LOPRESSOR) tablet 25 mg     mirabegron (MYRBETRIQ) 24 hr tablet 50 mg     naloxone (NARCAN) injection 0.2 mg    Or     naloxone (NARCAN) injection 0.4 mg    Or     naloxone (NARCAN) injection 0.2 mg    Or     naloxone (NARCAN) injection 0.4 mg     ondansetron (ZOFRAN ODT) ODT tab 4 mg    Or     ondansetron (ZOFRAN) injection 4 mg     senna-docusate (SENOKOT-S/PERICOLACE) 8.6-50 MG per tablet 1 tablet    Or     senna-docusate (SENOKOT-S/PERICOLACE) 8.6-50 MG per tablet 2 tablet     traMADol (ULTRAM) half-tab 25 mg             Review of Systems:   CV: NEGATIVE for chest pain, palpitations or peripheral edema  C: NEGATIVE for fever, chills, change in weight  E/M: NEGATIVE for ear, mouth and throat problems  R: NEGATIVE for significant cough or SOB          Physical Exam:   All vitals have been reviewed  Patient Vitals for the past 24 hrs:   BP Temp Temp src Pulse Resp SpO2   04/03/23 0700 (!) 153/76 97.9  F (36.6  C) Oral 60 16 93 %   04/03/23 0207 136/60 98.1  F (36.7  C) Oral 63 17 98 %   04/02/23 2016 (!) 141/79 -- -- 64 -- --   04/02/23 1856 138/59 98  F (36.7  C) Oral 58 18 95 %   04/02/23 1634 139/66 98.1  F (36.7  C) Oral 61 18 94 %   04/02/23 1100 123/58 97.8  F (36.6  C) Oral 54 18 95 %       Intake/Output Summary (Last 24 hours) at 4/3/2023 1011  Last data filed at 4/3/2023 0900  Gross per 24 hour   Intake 100 ml   Output 700 ml   Net -600 ml       Constitutional: Pleasant, alert, appropriate, following commands. Occasionally  forgetful.  HEENT: Head atraumatic normocephalic. Pupils equal round and reactive.  Respiratory: Unlabored breathing no audible wheeze  Cardiovascular: Regular rate and rhythm per pulses.  GI: Abdomen is non-distended.  Lymph/Hematologic: No lymphadenopathy in areas examined.  Genitourinary: No restrepo  Skin: No rashes, no cyanosis, no edema.  Musculoskeletal: Bilateral lower extremities: Skin intact. No drainage. Chronic venous stasis changes and xeroderma. No erythema, ecchymosis, induration, or fluctuance. 1+ effusion right knee, 1-2+ effusion left knee. No palpable warmth. Tender over the left knee lateral joint line and right knee medial joint line. Nontender over the remainder of the knee, lower leg, and ankle/foot. Bilateral calves are soft and nontender. Tender over the left peritrochanteric region. PROM 0-80 degrees bilateral knees with crepitus but without significant increased pain at endpoints. Mild laxity and increased pain to left knee with valgus stress, otherwise bilateral knees stable to varus stress and right knee to valgus stress. 5/5 knee flexion and extension, ankle dorsiflexion and plantar flexion, bilaterally. Able wiggle toes. DP pulses 1+, bilaterally. Sensation grossly intact to light touch.  Neurologic: normal without focal findings, intact mental status, speech normal, alert and oriented          Data:   All laboratory data reviewed  Results for orders placed or performed during the hospital encounter of 04/01/23   XR Knee Left 3 Views     Status: None    Narrative    EXAM: XR KNEE LEFT 3 VIEWS  LOCATION: [facility]  DATE/TIME: 4/1/2023 3:59 PM    INDICATION: pain,  no trauma, obvious degenerative joint disease, cannot bear weight on left leg, no fever or chills.  COMPARISON: None.      Impression    IMPRESSION: Advanced degenerative narrowing medial compartment left knee with bone-on-bone contact and osteophytic spurring. No evidence for fracture. No significant  effusion.   XR Pelvis and Hip Bilateral 2 Views     Status: None    Narrative    EXAM: XR PELVIS AND HIP BILATERAL 2 VIEWS  LOCATION: [facility]  DATE/TIME: 4/1/2023 3:59 PM    INDICATION: Pain in the hips, left greater than right but no history of trauma. Difficulty bearing weight.  COMPARISON: None.      Impression    IMPRESSION: Advanced degenerative change at the right hip joint with bone-on-bone contact and osteophytic spurring. Less advanced degenerative narrowing left hip joint. Both hips negative for fracture or dislocation. Pelvis negative for fracture.   XR Lumbar Spine 2/3 Views     Status: None    Narrative    EXAM: XR LUMBAR SPINE 2/3 VIEWS  LOCATION: [facility]  DATE/TIME: 4/2/2023 1:40 PM    INDICATION: Pain.  COMPARISON: None.      Impression    IMPRESSION:     Suboptimal visualization of upper lumbar vertebra on lateral projection due to positioning with overlapping arm.    Lordotic curvature is maintained. Mild upper lumbar dextrocurvature and lower lumbar levocurvature. Grade 1 retrolisthesis at L2-L3 is most likely degenerative.     Visualized osseous structures appear diffusely demineralized. Apparent band of sclerosis undercutting the L2 superior endplate may correspond with left eccentric osteophyte seen on frontal projection; however, trabecular impaction fractures are not   excluded. The visualized vertebral body heights are maintained.    Multilevel discogenic degenerative changes. Severe disc space narrowing at L2-L3 and, eccentric to the right, L4-L5.     No visible paraspinal soft tissue abnormality. Partially visualized right hip osteoarthritis. Scattered vascular calcifications compatible with atherosclerosis.       Basic metabolic panel     Status: Abnormal   Result Value Ref Range    Sodium 132 (L) 136 - 145 mmol/L    Potassium 4.4 3.4 - 5.3 mmol/L    Chloride 95 (L) 98 - 107 mmol/L    Carbon Dioxide (CO2) 27 22 - 29 mmol/L    Anion  Gap 10 7 - 15 mmol/L    Urea Nitrogen 27.0 (H) 8.0 - 23.0 mg/dL    Creatinine 0.66 0.51 - 0.95 mg/dL    Calcium 9.8 (H) 8.2 - 9.6 mg/dL    Glucose 118 (H) 70 - 99 mg/dL    GFR Estimate 77 >60 mL/min/1.73m2   UA with Microscopic reflex to Culture     Status: Normal    Specimen: Urine, Midstream   Result Value Ref Range    Color Urine Light Yellow Colorless, Straw, Light Yellow, Yellow    Appearance Urine Clear Clear    Glucose Urine Negative Negative mg/dL    Bilirubin Urine Negative Negative    Ketones Urine Negative Negative mg/dL    Specific Gravity Urine 1.025 1.003 - 1.035    Blood Urine Negative Negative    pH Urine 7.0 5.0 - 7.0    Protein Albumin Urine Negative Negative mg/dL    Urobilinogen Urine Normal Normal, 2.0 mg/dL    Nitrite Urine Negative Negative    Leukocyte Esterase Urine Negative Negative    RBC Urine 2 <=2 /HPF    WBC Urine 1 <=5 /HPF    Squamous Epithelials Urine <1 <=1 /HPF    Narrative    Urine Culture not indicated   CRP inflammation     Status: Abnormal   Result Value Ref Range    CRP Inflammation 59.95 (H) <5.00 mg/L   CBC with platelets and differential     Status: None   Result Value Ref Range    WBC Count 8.7 4.0 - 11.0 10e3/uL    RBC Count 3.95 3.80 - 5.20 10e6/uL    Hemoglobin 12.5 11.7 - 15.7 g/dL    Hematocrit 38.3 35.0 - 47.0 %    MCV 97 78 - 100 fL    MCH 31.6 26.5 - 33.0 pg    MCHC 32.6 31.5 - 36.5 g/dL    RDW 14.3 10.0 - 15.0 %    Platelet Count 419 150 - 450 10e3/uL    % Neutrophils 64 %    % Lymphocytes 19 %    % Monocytes 9 %    % Eosinophils 6 %    % Basophils 1 %    % Immature Granulocytes 1 %    NRBCs per 100 WBC 0 <1 /100    Absolute Neutrophils 5.7 1.6 - 8.3 10e3/uL    Absolute Lymphocytes 1.6 0.8 - 5.3 10e3/uL    Absolute Monocytes 0.8 0.0 - 1.3 10e3/uL    Absolute Eosinophils 0.5 0.0 - 0.7 10e3/uL    Absolute Basophils 0.1 0.0 - 0.2 10e3/uL    Absolute Immature Granulocytes 0.1 <=0.4 10e3/uL    Absolute NRBCs 0.0 10e3/uL   Uric acid     Status: Normal   Result Value  Ref Range    Uric Acid 3.4 2.4 - 5.7 mg/dL   CRP inflammation     Status: Abnormal   Result Value Ref Range    CRP Inflammation 60.42 (H) <5.00 mg/L   CBC with platelets differential     Status: None    Narrative    The following orders were created for panel order CBC with platelets differential.  Procedure                               Abnormality         Status                     ---------                               -----------         ------                     CBC with platelets and d...[[id number]]                      Final result                 Please view results for these tests on the individual orders.          Attestation:  I have reviewed today's vital signs, notes, medications, labs and imaging with Dr. Ben García.  Amount of time performed on this consult: 60 minutes.    Yanira Jerry PA-C  [facility]

## 2023-04-03 NOTE — PROGRESS NOTES
Observation goals  PRIOR TO DISCHARGE        Comments:   -diagnostic tests and consults completed and resulted: not met    -vital signs normal or at patient baseline: met    -adequate pain control on oral analgesics: met    -returns to baseline functional status: not met    -safe disposition plan has been identified: not met, PT consult pending   Nurse to notify provider when observation goals have been met and patient is ready for discharge.

## 2023-04-03 NOTE — CONSULTS
[facility]  WOC Nurse Inpatient Assessment     Consulted for: sacral ulceration     Patient History (according to provider note(s):      Ham Marie is a 89 year old female who presents to the ED with LLE pain. She has long standing osteoarthritis of bilateral knee and hip joints. Normally her right leg gives her more trouble but since last evening she has been having severe left sided leg pain. She is normally able to walk fine with a walker, but today is unable to bear weight at all due to severe pain. She has difficulty pinpointing where the pain is located and just states the whole leg. When she is not moving she does not have any pain. She has some baseline lower extremity edema, which is stable and symmetric     Areas Assessed:      Pressure Injury Location: sacrum    Last photo: 4/3/2023  Wound type: chronic tissue injury, history of pressure injuries as evidenced by scar tissue bilaterally vessels are visible through very thin skin.   My PI Risk Assessment     Sensory Perception: 2 - Very Limited     Moisture: 2 - Very moist      Activity: 3 - Walks occasionally      Mobility: 2 - Very limited     Nutrition: 2 - Probably inadequate      Friction/Shear: 1 - Problem     TOTAL: 12    Treatment Plan:       Sacrum wound: Every 3 days     Cleanse the area with NS and pat dry.    Apply No sting film barrier to periwound skin.    Cover wound with Sacral Mepilex (#[id number])    Change dressing Q 3 days.    Turn and reposition Q 2hrs side to side only.    Ensure pt has Jesus-cushion while sitting up in the chair.    FYI- If pt has constant incontinent loose stools needing dressing changes Q shift please discontinue the Mepilex dressing and apply criticaid barrier paste BID and PRN.  Orders: Written    RECOMMEND PRIMARY TEAM ORDER: None, at this time  Education provided: plan of care and Off-loading pressure  Discussed plan of care with: Nurse  WOC nurse follow-up plan: signing off  Notify  WOC if wound(s) deteriorate.  Nursing to notify the Provider(s) and re-consult the WOC Nurse if new skin concern.    DATA:     Current support surface: Standard  Other: ordered pulsate due to high risk of pressure injury development and IAD   Containment of urine/stool: Continent of bladder, Continent of bowel, Incontinence Protocol and Diaper  BMI: There is no height or weight on file to calculate BMI.   Active diet order: Orders Placed This Encounter      Regular Diet Adult     Output: I/O last 3 completed shifts:  In: -   Out: 700 [Urine:700]     Labs: Recent Labs   Lab 04/01/23  1620   HGB 12.5   WBC 8.7     Pressure injury risk assessment:   Sensory Perception: 4-->no impairment  Moisture: 3-->occasionally moist  Activity: 2-->chairfast  Mobility: 3-->slightly limited  Nutrition: 3-->adequate  Friction and Shear: 1-->problem  Jun Score: 16    Ruth Cobian RN BS CWOCN  Pager no longer is use, please contact through [employer]   [facility]: WOC Nurse

## 2023-04-03 NOTE — PROGRESS NOTES
Observation Goals:    -diagnostic tests and consults completed and resulted - in progress.    -vital signs normal or at patient baseline - VS stable and on room air.    -adequate pain control on oral analgesics - denies pain on lower extremities when lying still.    -returns to baseline functional status - not yet, still unable to walk due to pain.    -safe disposition plan has been identified - in progress, awaiting Ortho surgery consult.

## 2023-04-03 NOTE — PROGRESS NOTES
Spoke to RN, ordered pulsate mattress & cushion for chair and will return for sacral assessment when pt is done with breakfast.

## 2023-04-04 NOTE — PROGRESS NOTES
04/04/23 1000   Appointment Info   Signing Clinician's Name / Credentials (PT) Deepthi Stewart, SPT   Student Supervision Direct supervision provided   Quick Adds   Quick Adds Certification   Living Environment   People in Home facility resident   Current Living Arrangements assisted living   Home Accessibility no concerns   Living Environment Comments Patient resides alone at [facility]. Pt reports minimal social support with only her daughter who is close by and occassionally visits her.   Self-Care   Usual Activity Tolerance moderate   Current Activity Tolerance fair   Regular Exercise No   Equipment Currently Used at Home walker, rolling   Fall history within last six months no   Activity/Exercise/Self-Care Comment Recieves assist from [facility] for bathing, dressing, meals and medications. Pt reports she had been going to the bathroom by herself at [facility]. Staffing transports her via WC to meals. Otherwise ambulates within apartment with 2WW.   General Information   Onset of Illness/Injury or Date of Surgery 04/01/23   Referring Physician Robin Sanchez MD   Patient/Family Therapy Goals Statement (PT) return to [facility]   Pertinent History of Current Problem (include personal factors and/or comorbidities that impact the POC) 89 year old female with past medical history significant for osteoarthritis , prior DVT/PE, HTN, Hypothyroidism admitted on 4/1/2023 with R lower extremity pain.   Weight-Bearing Status - RLE weight-bearing as tolerated   Cognition   Affect/Mental Status (Cognition) agitated;anxious   Orientation Status (Cognition) oriented x 4   Follows Commands (Cognition) follows one-step commands;75-90% accuracy;delayed response/completion;increased processing time needed;repetition of directions required   Behavioral Issues overwhelmed easily  (Overwhelmed secondary to pain/discomfort at neck)   Pain Assessment   Patient Currently in Pain Yes, see Vital Sign flowsheet  (10/10 pain in neck)    Integumentary/Edema   Integumentary/Edema Comments bilateral LE edema, age related skin changes  (bilateral LE edema. age related skin changes)   Posture    Posture Forward head position;Protracted shoulders   Range of Motion (ROM)   Range of Motion ROM deficits secondary to pain;ROM deficits secondary to swelling   Strength (Manual Muscle Testing)   Strength (Manual Muscle Testing) Deficits observed during functional mobility   Bed Mobility   Comment, (Bed Mobility) Sit to supine SBA   Transfers   Comment, (Transfers) CGAx1 STS to 2WW   Gait/Stairs (Locomotion)   Comment, (Gait/Stairs) Patient ambulates ~4' with FWW and CGAx1, demos slowed gait speed and forward flexed posture   Balance   Balance Comments impaired dynamic balance, baseline use of AD   Sensory Examination   Sensory Perception patient reports no sensory changes   Coordination   Coordination no deficits were identified   Muscle Tone   Muscle Tone no deficits were identified   Clinical Impression   Criteria for Skilled Therapeutic Intervention Yes, treatment indicated   PT Diagnosis (PT) impaired functional mobility   Influenced by the following impairments weakness, pain, decreased activity tolerance, impaired balance   Functional limitations due to impairments Impaired bed mobility, transfers, ambulation   Clinical Presentation (PT Evaluation Complexity) Stable/Uncomplicated   Clinical Presentation Rationale clinical rationale, social support, PMHx   Clinical Decision Making (Complexity) low complexity   Planned Therapy Interventions (PT) balance training;bed mobility training;gait training;home exercise program;neuromuscular re-education;patient/family education;stretching;strengthening;transfer training;progressive activity/exercise;cryotherapy;postural re-education   Anticipated Equipment Needs at Discharge (PT)   (Pt has 2WW at home)   Risk & Benefits of therapy have been explained evaluation/treatment results reviewed;care plan/treatment goals  reviewed;current/potential barriers reviewed;risks/benefits reviewed;patient   PT Total Evaluation Time   PT Eval, Low Complexity Minutes (35053) 5   Therapy Certification   Start of care date 04/04/23   Certification date from 04/04/23   Certification date to 04/08/23   Medical Diagnosis R LE weakness   Physical Therapy Goals   PT Frequency 5x/week   PT Predicted Duration/Target Date for Goal Attainment 04/08/23   PT Goals Bed Mobility;Transfers;Gait   PT: Bed Mobility Supine to/from sit;Rolling;Independent   PT: Transfers Sit to/from stand;Bed to/from chair;Assistive device;Modified independent   PT: Gait Supervision/stand-by assist;Assistive device;50 feet   Interventions   Interventions Quick Adds Therapeutic Activity   Therapeutic Activity   Therapeutic Activities: dynamic activities to improve functional performance Minutes (41519) 20   Symptoms Noted During/After Treatment Increased pain   Treatment Detail/Skilled Intervention Greeted in sitting. Upon arrival, pt in distress with increased LB and neck pain and requested to be moved back to bed. Pt was able to complete STS with CGA to 2WW. Attempted to ambulate, but pt complained of too much pain after <5 ft and transferred back to bed. VC for tall posture with foward gaze which was limited secondary to pain. Pt required VC for transfer set up and safe hand placement. Pt was SBA with sit to supine. Time spent for pt comfort as pt continued to complain of 10/10 neck pain with no allievation despite trials of towel roll, elevated head of bed, or pillows and AROM for cervical rotation. Pt was left supine in bed with bed alarm, call light and all needs within place. Hand off to charge RN regarding need for TCU.   PT Discharge Planning   PT Plan trial ambulation, repeat STS, LE strength   PT Discharge Recommendation (DC Rec) Transitional Care Facility   PT Rationale for DC Rec Pt below functional mobility baseline. Resides in skilled nursing, was receiving assist for bathing,  meals, meds, and transport to dining [place]ith. Was ambulatory within apartment and 2WW and reports IND with toileting. Currently pt requires assistx1 for transfers and is currently unable to ambulate short distances given significant increase in pain throughout body. Pain is currently the largest barrier. Anticipate pt will need TCU in order to progress strength and mobility once pain is under control. If pain control is managed and pt is able to progress to PLOF or long term is able to increase assistance pt may be able to dc home with assist and HHPT in order to address any remaining limitations.   PT Brief overview of current status CGA/SBA with all mobility.   Total Session Time   Timed Code Treatment Minutes 20   Total Session Time (sum of timed and untimed services) 25     M [facility]  OUTPATIENT PHYSICAL THERAPY EVALUATION  PLAN OF TREATMENT FOR OUTPATIENT REHABILITATION  (COMPLETE FOR INITIAL CLAIMS ONLY)  Patient's Last Name, First Name, M.I.  YOB: 1921  Ham Marie                        Provider's Name  [facility] Medical Record No.  [id number]                             Onset Date:  04/01/23   Start of Care Date:  04/04/23   Type:     _X_PT   ___OT   ___SLP Medical Diagnosis:  R LE weakness              PT Diagnosis:  impaired functional mobility Visits from SOC:  1     See note for plan of treatment, functional goals and certification details    I CERTIFY THE NEED FOR THESE SERVICES FURNISHED UNDER        THIS PLAN OF TREATMENT AND WHILE UNDER MY CARE     (Physician co-signature of this document indicates review and certification of the therapy plan).

## 2023-04-04 NOTE — PLAN OF CARE
Alert and oriented x4. Assist of 2 gbw. Pain to bilateral knees managed with tylenol, Voltaren, and acetaminophen. Incontinent of bowel and bladder. Mepilex in place to coccyx wound. +2 edema to BLE. Pending PT consult.

## 2023-04-04 NOTE — PROGRESS NOTES
Observation goals  PRIOR TO DISCHARGE        Comments:   -Diagnostic tests and consults completed and resulted: Partially met    -Vital signs normal or at patient baseline: Met    -Adequate pain control on oral analgesics: Met    -Returns to baseline functional status: Not met    -Safe disposition plan has been identified: Not met, PT consult pending   Nurse to notify provider when observation goals have been met and patient is ready for discharge.

## 2023-04-04 NOTE — PLAN OF CARE
Date & Time: 4/4/23 0700-1930  Orientation/Cognitive Concerns: A/O x4. forgetful  Abnl VS/O2: VSS on RA  Pain Management: scheduled tylenol and Voltaren gel. Lidocaine patches on bilateral knees  Behavior/Aggression Tool Color: green  Mobility: A/1 GB/W. Up to chair for meals  Diet: regular  Bowel/Bladder: Incontinent of bladder Up to BSC. BM today.   Skin: Redness to sacrum  Drains/Devices: PIV SL  Test/Procedures: N/A  Anticipated DC date: pending TCU placement

## 2023-04-04 NOTE — PLAN OF CARE
Shift [id number] Outcome Evaluation:  Summary: Increased pain in nieves LEs/knees  Orientation/Cognitive: A+O x 4, [place]  Observation Goals (Met/ Not Met): Not met   Mobility Level/Assist Equipment: A1-2 GB/W   Fall Risk (Y/N): Y  Behavior Concerns: Calm and cooperative   Pain Management: Lodicaine patches to bilateral knees, Voltaren gel applied, and scheduled Acetaminophen   Tele/VS/O2: VSS on RA except HTN, asymptomatic   ABNL Lab/BG: CRP 60.42, Na+ 132  Diet: Regular   Bowel/Bladder: Incontinent at times   Skin Concerns: Purplish nonblanchable to sacrum, mepliex in. WOCN following  Drains/Devices: PIV SL  Tests/Procedures for next shift: Pending PT consult   Anticipated DC date & active delays: Pending improvement   Patient Stated Goal for Today: To feel better  Other important info: Pressure Mattress in use

## 2023-04-04 NOTE — PROGRESS NOTES
[facility]    Internal Medicine Hospitalist Progress Note  04/04/2023  I evaluated patient on the above date.    Robin Sanchez Jr., MD  [phone] (p)  Text Page  Vocera        Assessment & Plan New actions/orders today (04/04/2023) are underlined. All lab results in the assessment and plan were reviewed.    89 yo female with history including HTN, hypothyroidism, DVT/PE history and OA, who presented 4/1/2023 with severe left knee pain.    On initial evaluation 4/1, labs notable for CBC normal; BMP with sodium 132; CRP 59.95; uric acid normal; UA negative for infection.    XR of the right knee 4/1 showed advanced degenerative narrowing of the medial compartment with bone on bone contact and osteophytic spurring; no evidence for fracture; no Effusion. XR of the hip 4/1 showed advanced degenerative change with bone on bone contact at the right hip joint; no evidence of fracture.       Left lower extremity pain suspect due to OA.  Severe bilateral lower extremity osteoarthritis.  * Initial presentation as above. Pt with significant knee pain and unable to ambulate in ED. PT and SW consulted on admit.  * On 4/3, Ortho consulted. Discussed with orthopedics who felt that trial of systemic steroids could benefit pt more than steroid injection(s) as pt has multiple joint symptoms and previous steroid injections have not helped. Started on prednisone. Also started on lidocaine patches.   - Reconsult PT, OT, pt may need TCU.  - Discontinue PRN tramadol and start PRN oxycodone 2.5 mg q6h given potential tramadol interaction with escitalopram.  - Continue prednisone 40 mg daily.  - Continue acetaminophen 975 mg q8h; diclofenac 1% gel QID; lidocaine patch 4% 2 patches q24h.    Hypertension (benign essential).  [PTA: metoprolol 25 mg BID.]  - Continue PTA metoprolol.     DVT/PE history.  - Continue PTA apixaban.     Hypothyroidism.  - Continue PTA levothyroxine.    Depression/anxiety.  - Continue  escitalopram.    OAB.  - Continue mirabegron.       Clinically Significant Risk Factors Present on Admission               # Drug Induced Coagulation Defect: home medication list includes an anticoagulant medication                   COVID-19 testing.  COVID-19 PCR Results        12/24/2022    23:47   COVID-19 PCR Results   SARS CoV2 PCR Positive     COVID-19 Antibody Results, Testing for Immunity         No data to display                Diet: Regular Diet Adult Other - please comment    Prophylaxis: PCD's, ambulation.   Barney Catheter: Not present  Lines: None     Code Status: No CPR- Do NOT Intubate    Disposition Plan   Expected discharge: 1-2d recommended to assisted living vs TCU pending pain control, therapy rec's.  Entered: Robin Sanchez MD 04/04/2023, 9:47 AM         Interval History   Pain seems to be better, but has not tried to ambulate yet.    -Data reviewed today: I reviewed all new labs and imaging over the last 24 hours. I personally reviewed no images or EKG's today.    Physical Exam    , Blood pressure (!) 151/68, pulse 63, temperature 97.8  F (36.6  C), temperature source Oral, resp. rate 18, SpO2 93 %, not currently breastfeeding. O2 Device: None (Room air)    There were no vitals filed for this visit.  Vital Signs with Ranges  Temp:  [97.3  F (36.3  C)-98.6  F (37  C)] 97.8  F (36.6  C)  Pulse:  [57-65] 63  Resp:  [16-20] 18  BP: (134-158)/(68-81) 151/68  SpO2:  [93 %-97 %] 93 %  Patient Vitals for the past 24 hrs:   BP Temp Temp src Pulse Resp SpO2   04/04/23 0728 (!) 151/68 97.8  F (36.6  C) Oral 63 18 93 %   04/04/23 0349 134/69 97.8  F (36.6  C) Oral 62 20 95 %   04/03/23 2352 (!) 141/79 97.4  F (36.3  C) Oral 57 16 96 %   04/03/23 2019 (!) 143/81 98.6  F (37  C) Oral 63 18 94 %   04/03/23 1600 (!) 158/79 97.7  F (36.5  C) Oral 65 16 93 %   04/03/23 1100 (!) 149/72 97.3  F (36.3  C) Oral 57 17 97 %     I/O's Last 24 hours  I/O last 3 completed shifts:  In: 220 [P.O.:220]  Out: -      Constitutional: Awake, alert, appears slightly anxious.  Respiratory: Diminished in bases. No crackles or wheezes.  Cardiovascular: RRR, no m/r/g.  GI: Soft, nt, nd, +BS.  Skin/Integumen: Bilateral lower extremity swelling, no focal knee swelling/erythema/warmth.  Other:        Data    Labs reviewed.  Recent Labs   Lab 04/01/23  1620   WBC 8.7   HGB 12.5   MCV 97      *   POTASSIUM 4.4   CHLORIDE 95*   CO2 27   BUN 27.0*   CR 0.66   ANIONGAP 10   APURVA 9.8*   *     No lab results found.  Recent Labs   Lab 04/01/23  1620   *     No results for input(s): INR, [id number] in the last 168 hours.  Recent Labs   Lab 04/01/23  1620   WBC 8.7       MICRO:  CULTURES (INCLUDING BLOOD AND URINE):  No lab results found in last 7 days.    No results found for this or any previous visit (from the past 24 hour(s)).    Medications   All medications were reviewed.      acetaminophen  975 mg Oral Q8H     apixaban ANTICOAGULANT  5 mg Oral BID     diclofenac  4 g Topical 4x Daily     escitalopram  10 mg Oral Daily     levothyroxine  50 mcg Oral Daily     lidocaine  2 patch Transdermal Q24H     lidocaine   Transdermal Q8H JUDIT     metoprolol tartrate  25 mg Oral BID     mirabegron  50 mg Oral Daily     predniSONE  40 mg Oral Daily     melatonin, naloxone **OR** naloxone **OR** naloxone **OR** naloxone, ondansetron **OR** ondansetron, senna-docusate **OR** senna-docusate, traMADol

## 2023-04-04 NOTE — PROGRESS NOTES
Care Management Follow Up    Length of Stay (days): 0    Expected Discharge Date: 04/05/2023     Concerns to be Addressed: all concerns addressed in this encounter  Anticipte return to [facility]  Patient plan of care discussed at interdisciplinary rounds: Yes    Anticipated Discharge Disposition: Assisted Living  Disposition Comments: Anticpte return home  Anticipated Discharge Services: None  Anticipated Discharge DME: None    Patient/family educated on Medicare website which has current facility and service quality ratings:    Education Provided on the Discharge Plan:    Patient/Family in Agreement with the Plan: yes    Referrals Placed by CM/SW:    Private pay costs discussed: Not applicable    Additional Information:  Writer received a therapy recommendation for TCU. Referral sent to [facility] as patient resides in the [facility]. Call from [facility] stating she cannot accept as they do not have any openings until next Monday. Writer will follow up with daughter to get more choices.     Chasidy Escobar, MSW, LGSW   Social Work   [facility]

## 2023-04-04 NOTE — PLAN OF CARE
Observation goals:   -diagnostic tests and consults completed and resulted - met  -vital signs normal or at patient baseline - met  -adequate pain control on oral analgesics - met  -returns to baseline functional status - not met  -safe disposition plan has been identified - not met, PT/OT to assess

## 2023-04-05 NOTE — PROGRESS NOTES
[facility]    Internal Medicine Hospitalist Progress Note  04/05/2023  I evaluated patient on the above date.    Robin Sanchez Jr., MD  [phone] (p)  Text Page  Vocera        Assessment & Plan New actions/orders today (04/05/2023) are underlined. All lab results in the assessment and plan were reviewed.    89 yo female with history including HTN, hypothyroidism, DVT/PE history and OA, who presented 4/1/2023 with severe left knee pain.    On initial evaluation 4/1, labs notable for CBC normal; BMP with sodium 132; CRP 59.95; uric acid normal; UA negative for infection.    XR of the right knee 4/1 showed advanced degenerative narrowing of the medial compartment with bone on bone contact and osteophytic spurring; no evidence for fracture; no Effusion. XR of the hip 4/1 showed advanced degenerative change with bone on bone contact at the right hip joint; no evidence of fracture.       Left lower extremity pain suspect due to OA.  Severe bilateral lower extremity osteoarthritis.  * Initial presentation as above. Pt with significant knee pain and unable to ambulate in ED. PT and SW consulted on admit.  * On 4/3, Ortho consulted. Discussed with orthopedics who felt that trial of systemic steroids could benefit pt more than steroid injection(s) as pt has multiple joint symptoms and previous steroid injections have not helped. Started on prednisone. Also started on lidocaine patches.   * On 4/4, seen by PT and TCu recommended.  - Continue prednisone 40 mg daily.  - Continue acetaminophen 975 mg q8h; diclofenac 1% gel QID; lidocaine patch 4% 2 patches q24h; PRN oxycodone 2.5 mg q6h; minimize opioids as able.  - Plan TCU.    Hypertension (benign essential).  [PTA: metoprolol 25 mg BID.]  - Continue PTA metoprolol.     DVT/PE history.  - Continue PTA apixaban.     Hypothyroidism.  - Continue PTA levothyroxine.    Depression/anxiety.  - Continue escitalopram.    OAB.  - Continue  mirabegron.       Clinically Significant Risk Factors Present on Admission               # Drug Induced Coagulation Defect: home medication list includes an anticoagulant medication                   COVID-19 testing.  COVID-19 PCR Results        12/24/2022    23:47   COVID-19 PCR Results   SARS CoV2 PCR Positive     COVID-19 Antibody Results, Testing for Immunity         No data to display                Diet: Regular Diet Adult Other - please comment    Prophylaxis: PCD's, ambulation.   Barney Catheter: Not present  Lines: None     Code Status: No CPR- Do NOT Intubate    Disposition Plan   Expected discharge: Recommended to transitional care unit pending TCU bed availability.  Entered: Robin Sanchez MD 04/05/2023, 8:35 AM         Interval History   Doing OK.  Feels about the same.  Can stand but still with difficulties ambulating.    -Data reviewed today: I reviewed all new labs and imaging over the last 24 hours. I personally reviewed no images or EKG's today.    Physical Exam    , Blood pressure 138/84, pulse 54, temperature 98  F (36.7  C), temperature source Oral, resp. rate 16, SpO2 95 %, not currently breastfeeding. O2 Device: None (Room air)    There were no vitals filed for this visit.  Vital Signs with Ranges  Temp:  [97.8  F (36.6  C)-98.6  F (37  C)] 98  F (36.7  C)  Pulse:  [54-70] 54  Resp:  [14-18] 16  BP: (113-159)/(64-90) 138/84  SpO2:  [93 %-95 %] 95 %  Patient Vitals for the past 24 hrs:   BP Temp Temp src Pulse Resp SpO2   04/05/23 0744 138/84 98  F (36.7  C) Oral 54 16 95 %   04/05/23 0400 -- -- -- -- 18 --   04/05/23 0144 -- -- -- -- 14 --   04/05/23 0059 (!) 150/87 97.8  F (36.6  C) Oral 61 16 95 %   04/04/23 2007 (!) 146/90 -- -- 70 16 94 %   04/04/23 1557 (!) 159/72 98.2  F (36.8  C) Oral 69 16 95 %   04/04/23 1101 113/64 98.6  F (37  C) Oral 60 18 93 %     I/O's Last 24 hours  I/O last 3 completed shifts:  In: 240 [P.O.:240]  Out: -     Constitutional: Awake, alert, pleasant,  conversant.  Respiratory: Diminished in bases. No crackles or wheezes.  Cardiovascular: RRR, no m/r/g.  GI: Soft, nt, nd, +BS.  Skin/Integumen:  Other:        Data    Labs reviewed.  Recent Labs   Lab 04/01/23  1620   WBC 8.7   HGB 12.5   MCV 97      *   POTASSIUM 4.4   CHLORIDE 95*   CO2 27   BUN 27.0*   CR 0.66   ANIONGAP 10   APURVA 9.8*   *     No lab results found.  Recent Labs   Lab 04/01/23  1620   *     No results for input(s): INR, [id number] in the last 168 hours.  Recent Labs   Lab 04/01/23  1620   WBC 8.7       MICRO:  CULTURES (INCLUDING BLOOD AND URINE):  No lab results found in last 7 days.    No results found for this or any previous visit (from the past 24 hour(s)).    Medications   All medications were reviewed.      acetaminophen  975 mg Oral Q8H     apixaban ANTICOAGULANT  5 mg Oral BID     diclofenac  4 g Topical 4x Daily     escitalopram  10 mg Oral Daily     levothyroxine  50 mcg Oral Daily     lidocaine  2 patch Transdermal Q24H     lidocaine   Transdermal Q8H JUDIT     metoprolol tartrate  25 mg Oral BID     mirabegron  50 mg Oral Daily     predniSONE  40 mg Oral Daily     melatonin, naloxone **OR** naloxone **OR** naloxone **OR** naloxone, ondansetron **OR** ondansetron, oxyCODONE, senna-docusate **OR** senna-docusate

## 2023-04-05 NOTE — PROGRESS NOTES
Observation goals:      -Diagnostic tests and consults completed and resulted - Met    -Vital signs normal or at patient baseline - Met    -Adequate pain control on oral analgesics - Met    -Returns to baseline functional status - Not met    -Safe disposition plan has been identified - Not met        Patient is here with BLE and knee pain,was unable to ambulate at home

## 2023-04-05 NOTE — PLAN OF CARE
Overall Patient Progress: improving  Orientation/Cognitive: A&Ox4  Observation Goals (Met/ Not Met): Partially met.  Mobility Level/Assist Equipment: Ax1gb/w  Fall Risk (Y/N): yes  Behavior Concerns: None  Pain Management: denies pain at this time  Tele/VS/O2: VSS on RA with ex of htn   ABNL Lab/BG: None this shift  Diet: Regular-needs assistance to eat & takes her meds whole in applesauce  Bowel/Bladder: incont of urine at times, up to BSC  Skin Concerns: non-blanchable redness on left buttock, foam dressing CDI   Drains/Devices: PIV SL  Tests/Procedures for next shift: None  Anticipated DC date & active delays: Awaits TCU placement,SW following, sent out referrals  Other: patient is hard of hearing, visual cues and repetition used frequently

## 2023-04-05 NOTE — PLAN OF CARE
Observation goals:     -Diagnostic tests and consults completed and resulted - Met    -Vital signs normal or at patient baseline - Met    -Adequate pain control on oral analgesics - Met    -Returns to baseline functional status - Not met    -Safe disposition plan has been identified - Not met

## 2023-04-05 NOTE — PROGRESS NOTES
Observation goals:      -Diagnostic tests and consults completed and resulted - Met    -Vital signs normal or at patient baseline - Met    -Adequate pain control on oral analgesics - Met    -Returns to baseline functional status - Not met    -Safe disposition plan has been identified - Partially met        Patient is here with BLE and knee pain,was unable to ambulate at home, plans to return to [facility].

## 2023-04-05 NOTE — PLAN OF CARE
Observation goals:     -Diagnostic tests and consults completed and resulted - Met    -Vital signs normal or at patient baseline - Met    -Adequate pain control on oral analgesics - Met    -Returns to baseline functional status - Not met    -Safe disposition plan has been identified - Not met      Patient is here with BLE and knee pain,was unable to ambulate at home    Orientation/Cognitive: A&Ox4  Observation Goals (Met/ Not Met): Not met.  Mobility Level/Assist Equipment: Ax1gb/w  Fall Risk (Y/N): yes  Behavior Concerns: None,calm & cooperative.  Pain Management: denies pain, scheduled Tylenol given  Tele/VS/O2: VSS on RA, did not want to be woken from sleep for vital signs check around 0400  ABNL Lab/BG: None this shift  Diet: Regular-needs assistance to eat & takes her meds whole in applesauce  Bowel/Bladder: incont of urine at times, up to BSC  Skin Concerns: non-blanchable redness on left buttock, foam dressing C/D/I  Drains/Devices: PIV SL  Tests/Procedures for next shift: None  Anticipated DC date & active delays: Awaits TCU placement,SW following, sent out referrals

## 2023-04-05 NOTE — UTILIZATION REVIEW
Concurrent stay review; Secondary Review Determination - [facility]        Under the authority of the Utilization Management Committee, the utilization review process indicated a secondary review on the above patient.  The review outcome is based on review of the medical records, discussions with staff, and applying clinical experience noted on the date of the review.        (x) Observation/outpatient Status Appropriate - Concurrent stay review       RATIONALE FOR DETERMINATION:     Patient delayed discharge is related to disposition, there is no medical necessity for inpatient admission at the time of this review. If there is a change in patient status, please resend for review.    The information on this document is developed by the utilization review team in order for the business office to ensure compliance.  This only denotes the appropriateness of proper admission status and does not reflect the quality of care rendered.       The definitions of Inpatient Status and Observation Status used in making the determination above are those provided in the CMS Coverage Manual, Chapter 1 and Chapter 6, section 70.4.       Sincerely,    Mike Armstrong, DO

## 2023-04-05 NOTE — PLAN OF CARE
PRIMARY DIAGNOSIS: GENERALIZED WEAKNESS    OUTPATIENT/OBSERVATION GOALS TO BE MET BEFORE DISCHARGE  1. Orthostatic performed: N/A    2. Tolerating PO medications: Yes    3. Return to near baseline physical activity: Yes    4. Cleared for discharge by consultants (if involved): No    Discharge Planner Nurse   Safe discharge environment identified: No  Barriers to discharge: Yes       Entered by: Shari Stein RN 04/05/2023 5:41 PM     Please review provider order for any additional goals.   Nurse to notify provider when observation goals have been met and patient is ready for discharge.Goal Outcome Evaluation:

## 2023-04-05 NOTE — PLAN OF CARE
Goal Outcome Evaluation:      Plan of Care Reviewed With: patient, child    Overall Patient Progress: improving  Orientation/Cognitive: A&Ox4  Observation Goals (Met/ Not Met): Partially met.  Mobility Level/Assist Equipment: Ax1gb/w  Fall Risk (Y/N): yes  Behavior Concerns: None,calm & cooperative.  Pain Management: denies pain, scheduled Tylenol given  Tele/VS/O2: VSS on RA  ABNL Lab/BG: None this shift  Diet: Regular-needs assistance to eat & takes her meds whole in applesauce  Bowel/Bladder: incont of urine at times, up to BSC  Skin Concerns: non-blanchable redness on left buttock, foam dressing C/D/I  Drains/Devices: PIV SL  Tests/Procedures for next shift: None  Anticipated DC date & active delays: Awaits TCU placement,SW following, sent out referrals     Hard of hearing, visual cues and repetition used frequently.

## 2023-04-05 NOTE — PROGRESS NOTES
Observation goals:   -diagnostic tests and consults completed and resulted - met  -vital signs normal or at patient baseline - met  -adequate pain control on oral analgesics - met  -returns to baseline functional status - not met  -safe disposition- not met        Orientation/Cognitive Concerns: A/O x4. forgetful  Abnl VS/O2: VSS on RA  Pain Management: scheduled tylenol and Voltaren gel.   Behavior/Aggression Tool Color: green  Mobility: A/1 GB/W.  Diet: regular  Bowel/Bladder: Incontinent of bladder Up to BSC.  Skin: Redness to sacrum  Drains/Devices: PIV SL  Test/Procedures: N/A  Anticipated DC date: pending TCU placement

## 2023-04-05 NOTE — PROGRESS NOTES
Care Management Follow Up    Length of Stay (days): 0    Expected Discharge Date: 04/06/2023     Concerns to be Addressed: all concerns addressed in this encounter  Anticipte return to [facility]  Patient plan of care discussed at interdisciplinary rounds: Yes    Anticipated Discharge Disposition: Assisted Living  Disposition Comments: Anticpte return home  Anticipated Discharge Services: None  Anticipated Discharge DME: None    Patient/family educated on Medicare website which has current facility and service quality ratings:    Education Provided on the Discharge Plan:    Patient/Family in Agreement with the Plan: yes    Referrals Placed by CM/SW:  none  Private pay costs discussed: Not applicable    Additional Information:  Writer left message for [facility] for possible return.       Mallorie Paez RN

## 2023-04-06 NOTE — PROGRESS NOTES
Orientation/Cognitive: AOx4  Observation Goals (Met/ Not Met): partially met, see notes below.  Mobility Level/Assist Equipment: A1 GBW  Fall Risk (Y/N): yes  Behavior Concerns: calm and cooperative  Pain Management: pain on bilateral knee, hurts with activity or walking.  Tele/VS/O2: VS stable and on room air;    ABNL Lab/BG: glucose - pending collection.  Diet: regular  Bowel/Bladder: incontinent BB at times, on external female catheter  Skin Concerns: edema +3 on bilateral lower extremities.  Drains/Devices: on external female catheter  Tests/Procedures for next shift: none  Anticipated DC date & active delays: TBD, TCU placement to [facility]. Resting comfortably in bed. Kash Schwartz RN on 4/6/2023 at 5:33 PM

## 2023-04-06 NOTE — PROGRESS NOTES
"Care Management Follow Up    Length of Stay (days): 0    Expected Discharge Date: 04/07/2023     Concerns to be Addressed: all concerns addressed in this encounter  Patient plan of care discussed at interdisciplinary rounds: Yes    Anticipated Discharge Disposition: Assisted Living if they will accept back  Disposition Comments: [facility] return home  Anticipated Discharge Services: None  Anticipated Discharge DME: None    Patient/family educated on Medicare website which has current facility and service quality ratings:    Education Provided on the Discharge Plan:    Patient/Family in Agreement with the Plan: yes    Referrals Placed by CM/SW:    Private pay costs discussed: Not applicable    Additional Information:  Writer received call back from Nurse at [facility]. She is adamant patient go to a TCU. \"We have limited staff here and if she requires more assistance and her pain is not managed, we are just limited.\" Writer faxed over notes for [facility]ara to review. Writer did let Tara know that patient is near her baseline with her assist of 1, mainly pain management would be the goal.    Writer received another call from Tara at [facility]. She feels patient is near her baseline but is still concerned with patient coming back. She wants to speak to her family and then see. Writer told Tara that patient is ready to be discharged.  Writer called daughter, Lakshmi. She does not feel that patient can manage at her long term with the lack of staff there this weekend. She was under the assumption that patient was going to TCU. Marlar went over [facility] referral and denial. She told writer that they would have a bed on Monday maybe.She would like a referral sent to [facility] or [facility] to see if they might have an opening.  Referrals were sent.      Mallorie Paez RN        "

## 2023-04-06 NOTE — PROGRESS NOTES
"Observation Goals:    -diagnostic tests and consults completed and resulted - in progress.    -vital signs normal or at patient baseline - VS stable and on room air; /86    -adequate pain control on oral analgesics - yes, controlled with scheduled medicine    -returns to baseline functional status - able to walk with A1 GBW.    -safe disposition plan has been identified - in progress, per RNCC note: \"Anticipte return to [facility]\"  "

## 2023-04-06 NOTE — DISCHARGE SUMMARY
[facility]  Discharge Summary        Ham Marie MRN# [id number]   YOB: 1921 Age: 89 year old     Date of Admission: 4/1/2023  Date of Discharge: 4/7/2023  Admitting Physician: Tennille Kowalski MD  Discharge Physician: Robin Sanchez MD     Primary Provider: Eduar Salinas  Primary Care Physician Phone Number: [phone]         Discharge Diagnoses:   1. Left lower extremity pain suspect due to OA.  2. Severe bilateral lower extremity osteoarthritis.        Other Chronic Medical Problems:      1. Hypertension (benign essential).  2. DVT/PE history.  3. Hypothyroidism.  4. Depression/anxiety.  5. OAB.       Allergies:         Allergies   Allergen Reactions     Erythromycin      upsets stomach     Zocor [Simvastatin - High Dose] Rash           Discharge Medications:        Current Discharge Medication List      START taking these medications    Details   Lidocaine (LIDOCARE) 4 % Patch Place 2 patches onto the skin every 24 hours To prevent lidocaine toxicity, patient should be patch free for 12 hrs daily.    Associated Diagnoses: Primary osteoarthritis of right hip      oxyCODONE (ROXICODONE) 5 MG tablet Take 0.5 tablets (2.5 mg) by mouth every 6 hours as needed for severe pain  Qty: 10 tablet, Refills: 0    Associated Diagnoses: Primary osteoarthritis of right hip      predniSONE (DELTASONE) 10 MG tablet 3 tabs daily for 2 days, then 2 tabs daily for 2 days, then 1 tab daily for 2 days, then stop  Qty: 20 tablet, Refills: 0    Associated Diagnoses: Primary osteoarthritis of right hip         CONTINUE these medications which have NOT CHANGED    Details   acetaminophen (TYLENOL) 500 MG tablet Take 1,000 mg by mouth 3 times daily And once daily prn      apixaban ANTICOAGULANT (ELIQUIS) 5 MG tablet Take 5 mg by mouth 2 times daily      calcium carbonate (OSCAL 500) 1250 (500 CA) MG TABS Take 1 tablet (1,250 mg) by mouth 2 times daily    Associated Diagnoses:  Hypocalcemia      cholecalciferol (VITAMIN D) 1000 UNIT tablet Take 1 tablet (1,000 Units) by mouth daily  Qty: 100 tablet, Refills: 3    Associated Diagnoses: Adjustment disorder with anxious mood      diclofenac (VOLTAREN) 1 % topical gel Apply 4 g topically 4 times daily    Associated Diagnoses: Primary osteoarthritis of both knees      escitalopram (LEXAPRO) 10 MG tablet Take 10 mg by mouth daily      levothyroxine (SYNTHROID/LEVOTHROID) 50 MCG tablet Take 50 mcg by mouth daily      magnesium 250 MG tablet Take 1 tablet by mouth daily.      metoprolol tartrate (LOPRESSOR) 25 MG tablet Take 25 mg by mouth 2 times daily      mirabegron (MYRBETRIQ) 50 MG 24 hr tablet Take 1 tablet (50 mg) by mouth daily  Qty: 90 tablet, Refills: 3    Associated Diagnoses: Urinary frequency      Multiple Vitamins-Minerals (PRESERVISION AREDS PO) Take 1 tablet by mouth 2 times daily      vitamin C (ASCORBIC ACID) 500 MG tablet Take 500 mg by mouth daily                 Discharge Instructions and Follow-Up:      Discharge Orders      Follow Up and recommended labs and tests    Follow-up with Dr. García ([facility]) as needed for a recheck of your bilateral knees and consideration of cortisone injections if indicated. No need for follow up labs or testing prior to this appointment.     Please contact Dr. García's care coordinator, Sasha, at [phone] to schedule or for any questions related to your orthopedic condition.     Weight bearing status    Continue PO prednisone per hospitalist team. Patient has not received benefit with cortisone injections in the past, but consider this at some point, likely on an outpatient basis, if needed.                          Mobilize with PT/OT.               WBAT BLE with walker.              Continue current pain regimen.              Continue cold compresses and consider Ace wrap if needed.               Patient may require TCU at discharge.              Follow up with Dr. Ben García  at [facility] (phone: [phone]) for consideration of cortisone injections should the patient be interested in these in the future.     Activity - Up with assistive device     Activity - Up with nursing assistance     General info for SNF    Length of Stay Estimate: Short Term Care: Estimated # of Days <30  Condition at Discharge: Improving  Level of care:skilled   Rehabilitation Potential: Good  Admission H&P remains valid and up-to-date: Yes  Recent Chemotherapy: N/A  Use Nursing Home Standing Orders: Yes     Mantoux instructions    Give two-step Mantoux (PPD) Per Facility Policy Yes     Follow Up and recommended labs and tests    Follow-up with nursing home physician.    Follow-up with primary care provider after TCU discharge.  Follow-up with [facility] in 2-3 weeks.     Activity - Up with nursing assistance     Reason for your hospital stay    1. Left lower extremity pain suspect due to OA.  2. Severe bilateral lower extremity osteoarthritis.     Physical Therapy Adult Consult    Evaluate and treat as clinically indicated.    Reason:  weakness and deconditioning     Occupational Therapy Adult Consult    Evaluate and treat as clinically indicated.    Reason:  weakness and deconditioning     Fall precautions     Diet    Follow this diet upon discharge: Orders Placed This Encounter      Regular Diet Adult Other - please comment             Consultations This Hospital Stay:      ORTHOPEDIC SURGERY IP CONSULT  WOUND OSTOMY CONTINENCE NURSE  IP CONSULT  PHYSICAL THERAPY ADULT IP CONSULT  CARE MANAGEMENT / SOCIAL WORK IP CONSULT  PHYSICAL THERAPY ADULT IP CONSULT  OCCUPATIONAL THERAPY ADULT IP CONSULT  PHYSICAL THERAPY ADULT IP CONSULT  OCCUPATIONAL THERAPY ADULT IP CONSULT        Admission History:      Please see the H&P by Tennille Kowalski MD on 4/1/2023 for complete details. Briefly, 89 yo female with history including HTN, hypothyroidism, DVT/PE history and OA, who presented 4/1/2023  with severe left knee pain.    On initial evaluation 4/1, labs notable for CBC normal; BMP with sodium 132; CRP 59.95; uric acid normal; UA negative for infection.    XR of the right knee 4/1 showed advanced degenerative narrowing of the medial compartment with bone on bone contact and osteophytic spurring; no evidence for fracture; no Effusion. XR of the hip 4/1 showed advanced degenerative change with bone on bone contact at the right hip joint; no evidence of fracture.         Problem Oriented Hospital Course:      Left lower extremity pain suspect due to OA.  Severe bilateral lower extremity osteoarthritis.  * Initial presentation as above. Pt with significant knee pain and unable to ambulate in ED. PT and SW consulted on admit.  * On 4/3, Ortho consulted. Discussed with orthopedics who felt that trial of systemic steroids could benefit pt more than steroid injection(s) as pt has multiple joint symptoms and previous steroid injections have not helped. Started on prednisone. Also started on lidocaine patches.   * On 4/4, seen by PT and TCu recommended.  - Continue prednisone 30 mg daily - taper over 1 week at discharge.  - Continue acetaminophen 975 mg q8h; diclofenac 1% gel QID; lidocaine patch 4% 2 patches q24h; PRN oxycodone 2.5 mg q6h; minimize opioids as able.  - Continue PT and OT at TCU.    Hypertension (benign essential).  [PTA: metoprolol 25 mg BID.]  - Continue PTA metoprolol.     DVT/PE history.  - Continue PTA apixaban.     Hypothyroidism.  - Continue PTA levothyroxine.    Depression/anxiety.  - Continue escitalopram.    OAB.  - Continue mirabegron.       Clinically Significant Risk Factors Present on Admission               # Drug Induced Coagulation Defect: home medication list includes an anticoagulant medication                       Pending Results:        Unresulted Labs Ordered in the Past 30 Days of this Admission     No orders found from 3/2/2023 to 4/2/2023.                Discharge  Disposition:      Discharged to TCU.        Discharge Time:      Less than 30 minutes.          Condition and Physical on Discharge:    See progress note on the same date as this discharge summary.          Key Imaging Studies, Lab Findings and Procedures/Surgeries:        Results for orders placed or performed during the hospital encounter of 04/01/23   XR Knee Left 3 Views    Narrative    EXAM: XR KNEE LEFT 3 VIEWS  LOCATION: [facility]  DATE/TIME: 4/1/2023 3:59 PM    INDICATION: pain,  no trauma, obvious degenerative joint disease, cannot bear weight on left leg, no fever or chills.  COMPARISON: None.      Impression    IMPRESSION: Advanced degenerative narrowing medial compartment left knee with bone-on-bone contact and osteophytic spurring. No evidence for fracture. No significant effusion.   XR Pelvis and Hip Bilateral 2 Views    Narrative    EXAM: XR PELVIS AND HIP BILATERAL 2 VIEWS  LOCATION: [facility]  DATE/TIME: 4/1/2023 3:59 PM    INDICATION: Pain in the hips, left greater than right but no history of trauma. Difficulty bearing weight.  COMPARISON: None.      Impression    IMPRESSION: Advanced degenerative change at the right hip joint with bone-on-bone contact and osteophytic spurring. Less advanced degenerative narrowing left hip joint. Both hips negative for fracture or dislocation. Pelvis negative for fracture.   XR Lumbar Spine 2/3 Views    Narrative    EXAM: XR LUMBAR SPINE 2/3 VIEWS  LOCATION: [facility]  DATE/TIME: 4/2/2023 1:40 PM    INDICATION: Pain.  COMPARISON: None.      Impression    IMPRESSION:     Suboptimal visualization of upper lumbar vertebra on lateral projection due to positioning with overlapping arm.    Lordotic curvature is maintained. Mild upper lumbar dextrocurvature and lower lumbar levocurvature. Grade 1 retrolisthesis at L2-L3 is most likely degenerative.     Visualized osseous structures appear  diffusely demineralized. Apparent band of sclerosis undercutting the L2 superior endplate may correspond with left eccentric osteophyte seen on frontal projection; however, trabecular impaction fractures are not   excluded. The visualized vertebral body heights are maintained.    Multilevel discogenic degenerative changes. Severe disc space narrowing at L2-L3 and, eccentric to the right, L4-L5.     No visible paraspinal soft tissue abnormality. Partially visualized right hip osteoarthritis. Scattered vascular calcifications compatible with atherosclerosis.

## 2023-04-06 NOTE — PROGRESS NOTES
"Observation Goals:    -diagnostic tests and consults completed and resulted - in progress.    -vital signs normal or at patient baseline - VS stable and on room air; /95    -adequate pain control on oral analgesics - yes, controlled with scheduled medicine    -returns to baseline functional status - able to walk with A1 GBW.    -safe disposition plan has been identified - in progress, per RNCC note: \"Anticipte return to [facility]\"  "

## 2023-04-06 NOTE — PROGRESS NOTES
[facility]    Internal Medicine Hospitalist Progress Note  04/06/2023  I evaluated patient on the above date.    Robin Sanchez Jr., MD  [phone] (p)  Text Page  Vocera        Assessment & Plan New actions/orders today (04/06/2023) are underlined. All lab results in the assessment and plan were reviewed.    89 yo female with history including HTN, hypothyroidism, DVT/PE history and OA, who presented 4/1/2023 with severe left knee pain.    On initial evaluation 4/1, labs notable for CBC normal; BMP with sodium 132; CRP 59.95; uric acid normal; UA negative for infection.    XR of the right knee 4/1 showed advanced degenerative narrowing of the medial compartment with bone on bone contact and osteophytic spurring; no evidence for fracture; no Effusion. XR of the hip 4/1 showed advanced degenerative change with bone on bone contact at the right hip joint; no evidence of fracture.       Left lower extremity pain suspect due to OA.  Severe bilateral lower extremity osteoarthritis.  * Initial presentation as above. Pt with significant knee pain and unable to ambulate in ED. PT and SW consulted on admit.  * On 4/3, Ortho consulted. Discussed with orthopedics who felt that trial of systemic steroids could benefit pt more than steroid injection(s) as pt has multiple joint symptoms and previous steroid injections have not helped. Started on prednisone. Also started on lidocaine patches.   * On 4/4, seen by PT and TCu recommended.  - Decrease prednisone 40 mg --> 30 mg daily - taper over 1 week at discharge.  - Continue acetaminophen 975 mg q8h; diclofenac 1% gel QID; lidocaine patch 4% 2 patches q24h; PRN oxycodone 2.5 mg q6h; minimize opioids as able.  - Plan TCU.    Hypertension (benign essential).  [PTA: metoprolol 25 mg BID.]  - Continue PTA metoprolol.     DVT/PE history.  - Continue PTA apixaban.     Hypothyroidism.  - Continue PTA levothyroxine.    Depression/anxiety.  - Continue  escitalopram.    OAB.  - Continue mirabegron.       Clinically Significant Risk Factors Present on Admission               # Drug Induced Coagulation Defect: home medication list includes an anticoagulant medication                   COVID-19 testing.  COVID-19 PCR Results        12/24/2022    23:47   COVID-19 PCR Results   SARS CoV2 PCR Positive     COVID-19 Antibody Results, Testing for Immunity         No data to display                Diet: Regular Diet Adult Other - please comment    Prophylaxis: PCD's, ambulation.   Barney Catheter: Not present  Lines: None     Code Status: No CPR- Do NOT Intubate    Disposition Plan   Expected discharge: Recommended to transitional care unit pending TCU bed availability.  Entered: Robin Sanchez MD 04/06/2023, 9:15 AM     Communication.  - I d/w pt's daughter 4/6.      Interval History   Doing about the same.  Tolerating diet.    -Data reviewed today: I reviewed all new labs and imaging over the last 24 hours. I personally reviewed no images or EKG's today.    Physical Exam    , Blood pressure (!) 155/73, pulse 68, temperature 97.9  F (36.6  C), temperature source Oral, resp. rate 18, SpO2 98 %, not currently breastfeeding. O2 Device: None (Room air)    There were no vitals filed for this visit.  Vital Signs with Ranges  Temp:  [97.9  F (36.6  C)-99  F (37.2  C)] 97.9  F (36.6  C)  Pulse:  [63-95] 68  Resp:  [16-18] 18  BP: (140-165)/() 155/73  SpO2:  [94 %-98 %] 98 %  Patient Vitals for the past 24 hrs:   BP Temp Temp src Pulse Resp SpO2   04/06/23 0836 (!) 155/73 97.9  F (36.6  C) Oral 68 18 98 %   04/06/23 0054 (!) 165/95 98.1  F (36.7  C) Axillary 67 17 98 %   04/05/23 1900 (!) 153/86 97.9  F (36.6  C) Oral 67 17 94 %   04/05/23 1558 (!) 157/107 99  F (37.2  C) Axillary 95 16 96 %   04/05/23 1241 (!) 140/68 98.3  F (36.8  C) Oral 63 16 96 %     I/O's Last 24 hours  I/O last 3 completed shifts:  In: 480 [P.O.:480]  Out: -     Constitutional: Awake, alert,  pleasant.  Respiratory: Diminished in bases. No crackles or wheezes.  Cardiovascular: RRR, no m/r/g.  GI:   Skin/Integumen:  Other:        Data    Labs reviewed.  Recent Labs   Lab 04/06/23  0633 04/01/23  1620   WBC  --  8.7   HGB  --  12.5   MCV  --  97   PLT  --  419   NA  --  132*   POTASSIUM  --  4.4   CHLORIDE  --  95*   CO2  --  27   BUN  --  27.0*   CR  --  0.66   ANIONGAP  --  10   APURVA  --  9.8*   * 118*     No lab results found.  Recent Labs   Lab 04/06/23  0633 04/01/23  1620   * 118*     No results for input(s): INR, [id number] in the last 168 hours.  Recent Labs   Lab 04/01/23  1620   WBC 8.7       MICRO:  CULTURES (INCLUDING BLOOD AND URINE):  No lab results found in last 7 days.    No results found for this or any previous visit (from the past 24 hour(s)).    Medications   All medications were reviewed.      acetaminophen  975 mg Oral Q8H     apixaban ANTICOAGULANT  5 mg Oral BID     diclofenac  4 g Topical 4x Daily     escitalopram  10 mg Oral Daily     levothyroxine  50 mcg Oral Daily     lidocaine  2 patch Transdermal Q24H     lidocaine   Transdermal Q8H JUDIT     metoprolol tartrate  25 mg Oral BID     mirabegron  50 mg Oral Daily     predniSONE  40 mg Oral Daily     melatonin, naloxone **OR** naloxone **OR** naloxone **OR** naloxone, ondansetron **OR** ondansetron, oxyCODONE, senna-docusate **OR** senna-docusate

## 2023-04-06 NOTE — PROGRESS NOTES
"Shift: 7118-1813  Orientation/Cognitive: AOx4  Observation Goals (Met/ Not Met): partially met, see notes below.  Mobility Level/Assist Equipment: A1 GBW  Fall Risk (Y/N): yes  Behavior Concerns: calm and cooperative  Pain Management: pain on bilateral knee, hurts with activity or walking.  Tele/VS/O2: VS stable and on room air; -160s systolic and 80-90s diastolic.  ABNL Lab/BG: glucose - pending collection.  Diet: regular  Bowel/Bladder: incontinent BB at times, on external female catheter  Skin Concerns: edema +3 on bilateral lower extremities.  Drains/Devices: on external female catheter  Tests/Procedures for next shift: none  Anticipated DC date & active delays: TBD, TCU placement to [facility].      Observation Goals:    -diagnostic tests and consults completed and resulted - in progress.    -vital signs normal or at patient baseline - VS stable and on room air; /95    -adequate pain control on oral analgesics - yes, controlled with scheduled medicine    -returns to baseline functional status - able to walk with A1 GBW.    -safe disposition plan has been identified - in progress, TCU placement, per RNCC note: \"Anticipte return to [facility]\"  "

## 2023-04-07 NOTE — PROGRESS NOTES
Observation Goals:    -diagnostic tests and consults completed and resulted - in progress.    -vital signs normal or at patient baseline - VS stable and on room air.    -adequate pain control on oral analgesics - yes, controlled with scheduled medicine    -returns to baseline functional status - able to walk with A1 GBW.    -safe disposition plan has been identified - in progress, TCU placement, per  note: referral sent to [facility] or [facility] to see if they might have an opening.

## 2023-04-07 NOTE — PROGRESS NOTES
Observation goals  PRIOR TO DISCHARGE         -diagnostic tests and consults completed and resulted - Not met  -vital signs normal or at patient baseline - Met  -adequate pain control on oral analgesics - Met  -returns to baseline functional status - Not met  -safe disposition plan has been identified - Not met    Nurse to notify provider when observation goals have been met and patient is ready for discharge.

## 2023-04-07 NOTE — PROGRESS NOTES
Observation Goals:    -diagnostic tests and consults completed and resulted - in progress.    -vital signs normal or at patient baseline - VS stable and on room air.    -adequate pain control on oral analgesics - yes, controlled with scheduled medicine    -returns to baseline functional status - able to walk with A1 GBW.    -safe disposition plan has been identified - in progress, TCU placement, per  note: referral sent to [facility] or [facility] to see if they might have an opening.

## 2023-04-07 NOTE — PROGRESS NOTES
Shift: 3922-1082  Orientation/Cognitive: AOx4  Observation Goals (Met/ Not Met): partially met, see notes below.  Mobility Level/Assist Equipment: A1 GBW  Fall Risk (Y/N): yes  Behavior Concerns: calm and cooperative  Pain Management: pain on bilateral knee, hurts with activity or walking.  Tele/VS/O2: VS stable and on room air  ABNL Lab/BG: glucose - pending collection.  Diet: regular  Bowel/Bladder: incontinent BB at times, on external female catheter  Skin Concerns: edema +3 on bilateral lower extremities.  Drains/Devices: on external female catheter  Tests/Procedures for next shift: none  Anticipated DC date & active delays: TBD, TCU placement, per SW note: referral sent to [facility] or [facility] to see if they might have an opening.      Observation Goals:     -diagnostic tests and consults completed and resulted - in progress.     -vital signs normal or at patient baseline - VS stable and on room air.     -adequate pain control on oral analgesics - yes, controlled with scheduled medicine     -returns to baseline functional status - able to walk with A1 GBW.     -safe disposition plan has been identified - in progress, TCU placement, per SW note: referral sent to [facility] or [facility] to see if they might have an opening.

## 2023-04-07 NOTE — PROGRESS NOTES
Observation goals  PRIOR TO DISCHARGE         -diagnostic tests and consults completed and resulted - Not met  -vital signs normal or at patient baseline - Met  -adequate pain control on oral analgesics - Met  -returns to baseline functional status - Met  -safe disposition plan has been identified - Met    Nurse to notify provider when observation goals have been met and patient is ready for discharge.

## 2023-04-07 NOTE — PROGRESS NOTES
Care Management Discharge Note    Discharge Date: 04/07/2023       Discharge Disposition: Transitional Care    Discharge Services: Other (see comment) (therapy)    Discharge DME: None    Discharge Transportation: family or friend will provide    Private pay costs discussed: private room/amenity fees and transportation costs    PAS Confirmation Code:    Patient/family educated on Medicare website which has current facility and service quality ratings:      Education Provided on the Discharge Plan:    Persons Notified of Discharge Plans: yes  Patient/Family in Agreement with the Plan: yes    Handoff Referral Completed: No    Additional Information:  Patient is discharging to [facility] today via [facility] wheelchair transport at [id number]. Patient and family aware of discharge plan. Costs associated with private room and transport discussed and agreed with. PAS completed. Discharge orders completed and sent to facility.     PAS-RR    D: Per DHS regulation, SW completed and submitted PAS-RR to [place] [employer] Direct Connect via the Senior LinkAge Line.  PAS-RR confirmation # is : [id number]    I: SW spoke with patient and they are aware a PAS-RR has been submitted.  SW reviewed with patient that they may be contacted for a follow up appointment within 10 days of hospital discharge if their SNF stay is < 30 days.  Contact information for Senior LinkAge Line was also provided.    A: Patient verbalized understanding.    P: Further questions may be directed to [facility] LinkAge Line at #[phone], option #4 for PAS-RR staff.          BROCK Carbajal

## 2023-04-07 NOTE — PLAN OF CARE
Physical Therapy Discharge Summary    Reason for therapy discharge:    Discharged to transitional care facility.    Progress towards therapy goal(s). See [facility] on [facility] for goal details.  Goals not met.  Barriers to achieving goals:   discharge from facility.    Therapy recommendation(s):    Continued therapy is recommended.  Rationale/Recommendations:  Pt below baseline for functional mobility and will benefit from further skilled PT at TCU to maximize IND and safety with mobility.

## 2023-04-07 NOTE — PROGRESS NOTES
Pt discharged [facility]CU [facility]. Discharge summary and paper worked handed to transport.

## 2023-04-07 NOTE — PROGRESS NOTES
[facility]    Internal Medicine Hospitalist Progress Note  04/07/2023  I evaluated patient on the above date.    Robin Sanchez Jr., MD  [phone] (p)  Text Page  Vocera        Assessment & Plan New actions/orders today (04/07/2023) are underlined. All lab results in the assessment and plan were reviewed.    89 yo female with history including HTN, hypothyroidism, DVT/PE history and OA, who presented 4/1/2023 with severe left knee pain.    On initial evaluation 4/1, labs notable for CBC normal; BMP with sodium 132; CRP 59.95; uric acid normal; UA negative for infection.    XR of the right knee 4/1 showed advanced degenerative narrowing of the medial compartment with bone on bone contact and osteophytic spurring; no evidence for fracture; no Effusion. XR of the hip 4/1 showed advanced degenerative change with bone on bone contact at the right hip joint; no evidence of fracture.       Left lower extremity pain suspect due to OA.  Severe bilateral lower extremity osteoarthritis.  * Initial presentation as above. Pt with significant knee pain and unable to ambulate in ED. PT and SW consulted on admit.  * On 4/3, Ortho consulted. Discussed with orthopedics who felt that trial of systemic steroids could benefit pt more than steroid injection(s) as pt has multiple joint symptoms and previous steroid injections have not helped. Started on prednisone. Also started on lidocaine patches.   * On 4/4, seen by PT and TCU recommended.  - Continue prednisone 30 mg daily - taper over 1 week at discharge.  - Continue acetaminophen 975 mg q8h; diclofenac 1% gel QID; lidocaine patch 4% 2 patches q24h; PRN oxycodone 2.5 mg q6h; minimize opioids as able.  - Plan TCU.    Hypertension (benign essential).  [PTA: metoprolol 25 mg BID.]  - Continue PTA metoprolol.     DVT/PE history.  - Continue PTA apixaban.     Hypothyroidism.  - Continue PTA levothyroxine.    Depression/anxiety.  - Continue  escitalopram.    OAB.  - Continue mirabegron.       Clinically Significant Risk Factors Present on Admission               # Drug Induced Coagulation Defect: home medication list includes an anticoagulant medication                   COVID-19 testing.  COVID-19 PCR Results        12/24/2022    23:47   COVID-19 PCR Results   SARS CoV2 PCR Positive     COVID-19 Antibody Results, Testing for Immunity         No data to display                Diet: Regular Diet Adult Other - please comment  Diet    Prophylaxis: PCD's, ambulation.   Barney Catheter: Not present  Lines: None     Code Status: No CPR- Do NOT Intubate    Disposition Plan   Expected discharge: Recommended to transitional care unit pending TCU bed availability.  Entered: Robin Sanchez MD 04/07/2023, 10:45 AM     Communication.  - I d/w pt's daughter 4/6.      Interval History   No acute events o/n.  Upset that she was awoken after just falling asleep.    -Data reviewed today: I reviewed all new labs and imaging over the last 24 hours. I personally reviewed no images or EKG's today.    Physical Exam    , Blood pressure (!) 164/79, pulse 59, temperature 98.1  F (36.7  C), temperature source Axillary, resp. rate 18, SpO2 95 %, not currently breastfeeding. O2 Device: Nasal cannula    There were no vitals filed for this visit.  Vital Signs with Ranges  Temp:  [97.1  F (36.2  C)-98.1  F (36.7  C)] 98.1  F (36.7  C)  Pulse:  [59-66] 59  Resp:  [17-18] 18  BP: (132-164)/(57-99) 164/79  SpO2:  [95 %-97 %] 95 %  Patient Vitals for the past 24 hrs:   BP Temp Temp src Pulse Resp SpO2   04/07/23 0900 (!) 164/79 98.1  F (36.7  C) Axillary 59 18 95 %   04/07/23 0131 (!) 145/77 97.7  F (36.5  C) Axillary 64 17 97 %   04/06/23 1927 (!) 149/82 97.8  F (36.6  C) Oral 66 18 97 %   04/06/23 1743 (!) 132/99 97.3  F (36.3  C) Axillary 63 18 --   04/06/23 1130 (!) 144/57 97.1  F (36.2  C) Oral 59 18 96 %     I/O's Last 24 hours  I/O last 3 completed shifts:  In: 240  [P.O.:240]  Out: -     Constitutional: Awake, alert.  Respiratory: Diminished in bases. No crackles or wheezes.  Cardiovascular: RRR, no m/r/g.  GI:   Skin/Integumen:  Other:        Data    Labs reviewed.  Recent Labs   Lab 04/06/23  0633 04/01/23  1620   WBC  --  8.7   HGB  --  12.5   MCV  --  97   PLT  --  419   NA  --  132*   POTASSIUM  --  4.4   CHLORIDE  --  95*   CO2  --  27   BUN  --  27.0*   CR  --  0.66   ANIONGAP  --  10   APURVA  --  9.8*   * 118*     No lab results found.  Recent Labs   Lab 04/06/23  0633 04/01/23  1620   * 118*     No results for input(s): INR, [id number] in the last 168 hours.  Recent Labs   Lab 04/01/23  1620   WBC 8.7       MICRO:  CULTURES (INCLUDING BLOOD AND URINE):  No lab results found in last 7 days.    No results found for this or any previous visit (from the past 24 hour(s)).    Medications   All medications were reviewed.      acetaminophen  975 mg Oral Q8H     apixaban ANTICOAGULANT  5 mg Oral BID     diclofenac  4 g Topical 4x Daily     escitalopram  10 mg Oral Daily     levothyroxine  50 mcg Oral Daily     lidocaine  2 patch Transdermal Q24H     lidocaine   Transdermal Q8H JUDIT     metoprolol tartrate  25 mg Oral BID     mirabegron  50 mg Oral Daily     melatonin, naloxone **OR** naloxone **OR** naloxone **OR** naloxone, ondansetron **OR** ondansetron, oxyCODONE, senna-docusate **OR** senna-docusate

## 2023-04-07 NOTE — PLAN OF CARE
Occupational Therapy: Orders received. Chart reviewed and discussed with care team.? Occupational Therapy not indicated, per chart, plans for pt to discharge to TCU. Defer OT needs to next level of care.? Defer discharge recommendations to Care Team.? Will complete orders.

## 2023-04-07 NOTE — PROGRESS NOTES
Care Management Follow Up    Length of Stay (days): 0    Expected Discharge Date: 04/08/2023     Concerns to be Addressed: all concerns addressed in this encounter  Anticipte return to [facility]  Patient plan of care discussed at interdisciplinary rounds: Yes    Anticipated Discharge Disposition: Assisted Living  Disposition Comments: Anticpte return home  Anticipated Discharge Services: None  Anticipated Discharge DME: None    Patient/family educated on Medicare website which has current facility and service quality ratings:    Education Provided on the Discharge Plan:    Patient/Family in Agreement with the Plan: yes    Referrals Placed by CM/SW:    Private pay costs discussed: Not applicable    Additional Information:  Patient has been accepted at [facility] for TCU into a private room. Private room fee is $36/day but waiving it for the first 7 days. SW called patient's daughter Lakshmi to provide update and she is in agreement with patient going to [facility] and the private room fee. SW let her know that admissions will need to submit an insurance auth to [employer] and she will do that this morning. Lakshmi asked that a wheelchair ride be set up. Transportation costs discussed and Lakshmi also in agreement. PAKO will await call back regarding insurance auth decision and then will proceed with coordinating discharge.     Addendum:  [employer] auth has been approved. PAKO reached out to physician to see if patient is able to discharge today.     PAKO will continue to follow.       BROCK Carbajal

## 2023-04-08 NOTE — PROGRESS NOTES
[facility]    Background: Transitional Care Management program identified per system criteria and reviewed by [facility] team for possible outreach.    Assessment: Upon chart review, CCRC Team member will not proceed with patient outreach related to this episode of Transitional Care Management program due to reason below:    Non-[facility] TCU: CCRC team member noted patient discharged to TCU/ARU/LTACH. Patient is not established with a [facility] currently supported by [facility] Primary Care-Care Coordination therefore handoff to Primary Care-Care Coordination is not appropriate at this time.    Plan: Transitional Care Management episode addressed appropriately per reason noted above.        Meg Jameson  Community Health Worker  [facility] [place], [facility]    *Connected Care Resource Team does NOT follow patient ongoing. Referrals are identified based on internal discharge reports and the outreach is to ensure patient has an understanding of their discharge instructions.

## 2023-06-09 PROBLEM — M25.552 HIP PAIN, LEFT: Status: ACTIVE | Noted: 2023-01-01

## 2023-06-09 PROBLEM — R26.2 UNABLE TO AMBULATE: Status: ACTIVE | Noted: 2023-01-01

## 2023-06-09 NOTE — PLAN OF CARE
Goal Outcome Evaluation:    -diagnostic tests and consults completed and resulted; partially met  -vital signs normal or at patient baseline; not met  Nurse to notify provider when observation goals have been met and patient is ready for discharge.

## 2023-06-09 NOTE — PROGRESS NOTES
06/09/23 1441   Appointment Info   Signing Clinician's Name / Credentials (PT) Efe Krause DPT   Quick Adds   Quick Adds Certification   Living Environment   People in Home facility resident   Current Living Arrangements assisted living   Home Accessibility no concerns   Transportation Anticipated family or friend will provide   Living Environment Comments [facility].   Self-Care   Usual Activity Tolerance moderate   Current Activity Tolerance fair   Equipment Currently Used at Home walker, rolling   Fall history within last six months yes   Number of times patient has fallen within last six months 1   Activity/Exercise/Self-Care Comment Pt reports that she has only had one fall in the past 6 months. Reports that she uses a FWW for mobility. States that she is supposed to have assistance with mobility. Reports that she fell because she got up without assistance because she needed to go to the bathroom urgently. Get wheelchaired down to the [facility].   General Information   Onset of Illness/Injury or Date of Surgery 06/09/23   Referring Physician Brant Dixon MD   Patient/Family Therapy Goals Statement (PT) Lay in the bed   Pertinent History of Current Problem (include personal factors and/or comorbidities that impact the POC) Ham Marie is a 89 year old female with a past medical history of permanent atrial fibrillation, history of DVT and PE on Eliquis, hypertension, overactive bladder, hypothyroidism presents to hospital after a fall.   Existing Precautions/Restrictions fall   Cognition   Affect/Mental Status (Cognition) agitated;anxious;confused   Orientation Status (Cognition) oriented to;person   Follows Commands (Cognition) follows two-step commands   Pain Assessment   Patient Currently in Pain Yes, see Vital Sign flowsheet  (Only reporting pain to L ankle w/ touch, denies pain w/ ambulating to bathroom.)   Integumentary/Edema   Integumentary/Edema Comments Pt w/  diffuse bruising to buttock region   Range of Motion (ROM)   ROM Comment WFLs for mobility and transfers no formal testing completed   Strength (Manual Muscle Testing)   Strength Comments WFLs for mobility and transfers no formal testing completed   Bed Mobility   Comment, (Bed Mobility) Supine to sit w/ SBA w/ HOB elevated as Pt has elevating bed at home   Transfers   Comment, (Transfers) Sit to stand w/ FWW and CGA   Gait/Stairs (Locomotion)   Comment, (Gait/Stairs) 5 ft w/ FWW and CGA   Balance   Balance Comments No overt LOB noted   Clinical Impression   Criteria for Skilled Therapeutic Intervention Yes, treatment indicated   PT Diagnosis (PT) Impaired ambulation   Influenced by the following impairments Impaired strength, balance and activity tolerance   Functional limitations due to impairments Impaired ADLs, IADLs, and functional mobility   Clinical Presentation (PT Evaluation Complexity) Stable/Uncomplicated   Clinical Presentation Rationale Clinical judgment   Clinical Decision Making (Complexity) low complexity   Planned Therapy Interventions (PT) balance training;bed mobility training;gait training;home exercise program;patient/family education;strengthening;transfer training;progressive activity/exercise   Risk & Benefits of therapy have been explained evaluation/treatment results reviewed;care plan/treatment goals reviewed;risks/benefits reviewed;current/potential barriers reviewed;participants voiced agreement with care plan;participants included;patient   PT Total Evaluation Time   PT Eval, Low Complexity Minutes (38204) 10   Therapy Certification   Start of care date 06/09/23   Certification date from 06/09/23   Certification date to 06/16/23   Medical Diagnosis Fall   Physical Therapy Goals   PT Frequency 5x/week   PT Predicted Duration/Target Date for Goal Attainment 06/16/23   PT Goals Bed Mobility;Transfers;Gait   PT: Bed Mobility Independent;Supine to/from sit   PT: Transfers Sit to/from  stand;Assistive device;Supervision/stand-by assist   PT: Gait Rolling walker;50 feet;Supervision/stand-by assist   Interventions   Interventions Quick Adds Therapeutic Activity   Therapeutic Activity   Therapeutic Activities: dynamic activities to improve functional performance Minutes (74957) 15   Symptoms Noted During/After Treatment Fatigue   Treatment Detail/Skilled Intervention Pt supine in bed at start of session. Pt agreeable to PT w/ lots of convincing. Pt reports that she hates physical therapy. Pt w/ some agitation on getting sitting at EOB. Able to maintaing seated balance for prolonged period of time. Pt ambulating ~10 ft to bathroom w/ FWW and CGA x1 and SBA x1. Pt urinating on way to bathroom needing assistance cleaning up. Pt having BM and more urine output in toilet. Pt transfering stand to sit w/ CGA and FWW. Able to help assist pulling down dependents. Sit to stand from toilet height w/ FWW and CGA. Able to help pull up dependends after whiping. Needing cues to grab back onto walker prior to ambulating as Pt attempting to ambulate w/ one hand and walker and other on toilet. Pt then ambulating 15 ft back to EOB w/ FWW and CGA x1. Sit to supine w/ SBA w/ HOB flat. Pt agitated about chucks pad on bed. All needs met at end of session w/ call light in place and bed alarm on.   PT Discharge Planning   PT Plan Transfers, bed mobility, ambulation distance   PT Discharge Recommendation (DC Rec) home with assist;home with home care physical therapy   PT Rationale for DC Rec Pt either at or near baseline mobility. Pt lives in halfway. Pt presenting after fall when she did not call for assistance to the bathroom. Typically has been A x1 for mobility w/ FWW and facility per Pt report. Pt currently A x1 w/ mobility w/ FWW. Denied pain w/ mobility to transfer and back. Anticipate when medically ready Pt can return to halfway w/ 24/7 assist for mobility.   PT Brief overview of current status SBA for bed mobility, CGA for  transfers and ambulation w/ FWW   Total Session Time   Timed Code Treatment Minutes 15   Total Session Time (sum of timed and untimed services) 25   [facility]  OUTPATIENT PHYSICAL THERAPY EVALUATION  PLAN OF TREATMENT FOR OUTPATIENT REHABILITATION  (COMPLETE FOR INITIAL CLAIMS ONLY)  Patient's Last Name, First Name, M.I.  YOB: 1921  Ham Marie                        Provider's Name  [facility] Medical Record No.  [id number]                             Onset Date:  06/09/23   Start of Care Date:  06/09/23   Type:     _X_PT   ___OT   ___SLP Medical Diagnosis:  Fall              PT Diagnosis:  Impaired ambulation Visits from SOC:  1     See note for plan of treatment, functional goals and certification details    I CERTIFY THE NEED FOR THESE SERVICES FURNISHED UNDER        THIS PLAN OF TREATMENT AND WHILE UNDER MY CARE     (Physician co-signature of this document indicates review and certification of the therapy plan).

## 2023-06-09 NOTE — PROGRESS NOTES
"   06/09/23 1500   Appointment Info   Signing Clinician's Name / Credentials (OT) Jacqueline Kapadia, OTR/L   Quick Adds   Quick Adds Certification   Living Environment   People in Home facility resident   Current Living Arrangements assisted living   Home Accessibility no concerns   Transportation Anticipated family or friend will provide   Living Environment Comments Pt reportedly lives in an [facility].   Self-Care   Usual Activity Tolerance moderate   Current Activity Tolerance fair   Equipment Currently Used at Home walker, rolling   Fall history within last six months yes   Number of times patient has fallen within last six months 1   Activity/Exercise/Self-Care Comment Pt reports that she has only had one fall in the past 6 months. Reports that she uses a FWW for mobility. States that she is supposed to have assistance with mobility. Reports that she fell because she got up without assistance because she needed to go to the bathroom urgently - pt reports they don't take her to the BR, but assist with dressing and bathing. Get wheelchaired down to the [facility]ning [facility].   Instrumental Activities of Daily Living (IADL)   IADL Comments Per facility   General Information   Onset of Illness/Injury or Date of Surgery 06/09/23   Referring Physician Brant Dixon MD   Patient/Family Therapy Goal Statement (OT) \"Why can't I go home?\"   Additional Occupational Profile Info/Pertinent History of Current Problem Per chart: \"Ham Marie is a 89 year old female with a past medical history of permanent atrial fibrillation, history of DVT and PE on Eliquis, hypertension, overactive bladder, hypothyroidism presents to hospital after a fall.\"   Existing Precautions/Restrictions fall   Limitations/Impairments safety/cognitive   General Observations and Info Pt [facility]   Cognitive Status Examination   Orientation Status person  (Oriented to year only)   Affect/Mental Status (Cognitive) confused   Follows Commands follows one-step " commands;50-74% accuracy   Cognitive Status Comments Pt confused, follows simple commands appropriately   Visual Perception   Visual Impairment/Limitations blurry vision   Sensory   Sensory Comments Pt denies numbness/tingling   Pain Assessment   Patient Currently in Pain No   Posture   Posture forward head position;protracted shoulders;kyphosis   Range of Motion Comprehensive   Comment, General Range of Motion BUEs WFL, limited B shoulder flexion   Strength Comprehensive (MMT)   Comment, General Manual Muscle Testing (MMT) Assessment BUEs WFL, global weakness   Coordination   Upper Extremity Coordination No deficits were identified   Bed Mobility   Bed Mobility supine-sit   Comment (Bed Mobility) SBA   Transfers   Transfers bed-chair transfer;sit-stand transfer;toilet transfer   Transfer Skill: Bed to Chair/Chair to Bed   Transfer Comments CGA   Sit-Stand Transfer   Sit/Stand Transfer Comments SBA   Toilet Transfer   Toilet Transfer Comments CGA per clinical judgement   Balance   Balance Comments No overt LOB noted   Activities of Daily Living   BADL Assessment/Intervention lower body dressing   Lower Body Dressing Assessment/Training   Comment, (Lower Body Dressing) Dep A   Clinical Impression   Criteria for Skilled Therapeutic Interventions Met (OT) Yes, treatment indicated   OT Diagnosis Decreased ind with I/ADLs   OT Problem List-Impairments impacting ADL problems related to;balance;cognition;activity tolerance impaired;mobility;strength   Assessment of Occupational Performance 3-5 Performance Deficits   Identified Performance Deficits bathing, dressing, toileting, toilet transfers, showers, shower transfers   Planned Therapy Interventions (OT) ADL retraining;transfer training   Clinical Decision Making Complexity (OT) low complexity   Anticipated Equipment Needs Upon Discharge (OT)   (TBD)   Risk & Benefits of therapy have been explained patient   OT Total Evaluation Time   OT Eval, Low Complexity Minutes  (72421) 9   Therapy Certification   Medical Diagnosis Fall   Start of Care Date 06/09/23   Certification date from 06/09/23   Certification date to 06/23/23   OT Goals   Therapy Frequency (OT) 2 times/wk   OT Predicted Duration/Target Date for Goal Attainment 06/23/23   OT Goals Hygiene/Grooming;Upper Body Dressing;Transfers;Toilet Transfer/Toileting   OT: Hygiene/Grooming supervision/stand-by assist  (FWW)   OT: Upper Body Dressing Supervision/stand-by assist   OT: Transfer Supervision/stand-by assist  (FWW, shower)   OT: Toilet Transfer/Toileting toilet transfer;cleaning and garment management;Modified independent  (FWW)   Therapeutic Activities   Therapeutic Activity Minutes (30978) 10   Symptoms noted during/after treatment fatigue   Treatment Detail/Skilled Intervention Pt greeted in supine, agreeable to OT with encouragement. Pt oriented to self only, gentle reorientation provided. Pt completes supine > sitting EOB with VCs to scoot to EOB, HOB raised. Pt with Dep A to don B socks sitting EOB. Pt completes sit > stand with SBA, FWW, VCs to push up from bed. Pt completes stand pivot to chair with CGA, FWW, pt encouraged for further ambulation, pt declines, VCs to back up to chair prior to sitting. Pt completes stand > sit in chair with CGA, FWW, VCs to reach for armrests. Pt up in chair with needs met, alarm set.   OT Discharge Planning   OT Plan g/h standing EOB vs standing at chair; UB dressing; progress mobility for toilet transfer as able vs BSC transfers   OT Discharge Recommendation (DC Rec) home with assist   OT Rationale for DC Rec Pt admitted d/t fall at [facility]. Pt functioning slightly below baseline level with noted  decreased strength, activity santana, baseline cog deficits impacting I/ADL independence. Anticipate with continued participation in IP OT, pt will progress for d/c home to [facility] with A x1 for functional mobility, continued assist with dressing, bathing, assist with toileting and assist with  IADLs.   Total Session Time   Timed Code Treatment Minutes 10   Total Session Time (sum of timed and untimed services) 19    [facility]  OUTPATIENT OCCUPATIONAL THERAPY  EVALUATION  PLAN OF TREATMENT FOR OUTPATIENT REHABILITATION  (COMPLETE FOR INITIAL CLAIMS ONLY)  Patient's Last Name, First Name, M.I.  YOB: 1921  Ham Marie                          Provider's Name  [facility] Medical Record No.  [id number]                             Onset Date:  06/09/23   Start of Care Date:  06/09/23   Type:     ___PT   _X_OT   ___SLP Medical Diagnosis:  Fall                    OT Diagnosis:  Decreased ind with I/ADLs Visits from SOC:  1     See note for plan of treatment, functional goals and certification details    I CERTIFY THE NEED FOR THESE SERVICES FURNISHED UNDER        THIS PLAN OF TREATMENT AND WHILE UNDER MY CARE     (Physician co-signature of this document indicates review and certification of the therapy plan).

## 2023-06-09 NOTE — ED NOTES
Pt stated have needed to have BM. Placed pt on bedpan with assist of 2. Pt unable to have BM, bed pan removed.

## 2023-06-09 NOTE — PROGRESS NOTES
RECEIVING UNIT ED HANDOFF REVIEW    ED Nurse Handoff Report was reviewed by: Veena Avalos RN on June 9, 2023 at 10:46 AM

## 2023-06-09 NOTE — H&P
[facility]    History and Physical  Hospitalist     Date of Admission:  6/9/2023    Assessment & Plan   Ham Marie is a 89 year old female with a past medical history of permanent atrial fibrillation, history of DVT and PE on Eliquis, hypertension, overactive bladder, hypothyroidism presents to hospital after a fall.    Unwitnessed fall  Patient had an unwitnessed fall without loss of consciousness or head trauma.  She had hip pain preventing her from ambulating bring her to the hospital. Ct pelvis negative for any acute pathology.  Due to pain the patient was unable to ambulate in the emergency room and is being admitted to observation.  -f/u pt/ot/social work consult  -pain control prn    Hyponatremia  Mild.  Appears to be the patient's baseline.      Chronic medical conditions: Resume PTA meds once med rec is complete  afib-metoprolol, eliquis  Hx DVT and PE-eliquis  htn-metoprolol  OAB-mirabegron  Hypothyroidism-synthroid    Code Status   DNR / DNI  DVT ppx: Ambulate  Expected length of stay less than 2 days    Clinically Significant Risk Factors Present on Admission               # Drug Induced Coagulation Defect: home medication list includes an anticoagulant medication    # Hypertension: Noted on problem list               Primary Care Physician   Eduar Salinas    Chief Complaint   Fall    History obtained from the patient and the medical chart    History of Present Illness   Ham Marie is a 89 year old female with a past medical history of permanent atrial fibrillation, history of DVT and PE on Eliquis, hypertension, overactive bladder, hypothyroidism presents to hospital after a fall.  The patient reports that she was ambulating to the bathroom, she knows that she should have called for help but she did not.  On her way to the bathroom she is not sure what happened but she lost her balance and fell to the ground, she thinks she may have tripped.  She reports  landing on her butt and falling back.  She denies any head trauma or loss of consciousness.  She reports that she started experiencing pain in her hips and could not get back up.  She denies pain anywhere else.  During my interview at rest she denies any discomfort or pain but did have pain earlier while trying to get up and ambulate in the emergency room.  The patient reports that she has been in her usual state of health and denies any other complaints.    Past Medical History    I have reviewed this patient's medical history and updated it with pertinent information if needed.   Past Medical History:   Diagnosis Date     Actinic keratoses      Arthritis      Carpal tunnel syndrome     right     DVT (deep venous thrombosis) (H) 2000    right      Heart murmur      Hypertension      Other and unspecified hyperlipidemia      Pulmonary embolism (H) 2000     Unspecified hypothyroidism        Past Surgical History   I have reviewed this patient's surgical history and updated it with pertinent information if needed.  Past Surgical History:   Procedure Laterality Date     APPENDECTOMY       ARTHROSCOPY KNEE WITH DEBRIDEMENT JOINT, COMBINED  5/13/2014    Procedure: ARTHROSCOPY KNEE WITH DEBRIDEMENT JOINT;  Surgeon: Missael Packer MD;  Location: [facility] TOTAL ABDOM HYSTERECTOMY      Hysterectomy, Total Abdominal       Allergies   Allergies   Allergen Reactions     Erythromycin      upsets stomach     Zocor [Simvastatin - High Dose] Rash       Social History   I have reviewed this patient's social history and updated it with pertinent information if needed. Ham Marie  reports that she has never smoked. She has never used smokeless tobacco. She reports current alcohol use. She reports that she does not use drugs.    Family History   I have reviewed this patient's family history and updated it with pertinent information if needed.   Family History   Problem Relation Age of Onset     C.A.D. No family hx of       Breast Cancer No family hx of      Cancer - colorectal No family hx of        Review of Systems   The 10 point Review of Systems is negative other than noted in the HPI or here.     Physical Exam   Temp: 97.3  F (36.3  C) Temp src: Oral BP: (!) 151/94 Pulse: 63   Resp: 16 SpO2: 93 % O2 Device: None (Room air)    Vital Signs with Ranges  Temp:  [97.3  F (36.3  C)] 97.3  F (36.3  C)  Pulse:  [63] 63  Resp:  [16] 16  BP: (151)/(94) 151/94  SpO2:  [93 %] 93 %  0 lbs 0 oz  Physical Exam  Vitals reviewed.   Constitutional:       Appearance: Normal appearance.      Comments: Pleasant elderly lady hard of hearing seen resting in bed comfortably in no apparent distress in emergency room.  She is a accompanied by her daughter is bedside.   HENT:      Head: Normocephalic and atraumatic.      Mouth/Throat:      Mouth: Mucous membranes are moist.      Pharynx: Oropharynx is clear.   Eyes:      Extraocular Movements: Extraocular movements intact.      Conjunctiva/sclera: Conjunctivae normal.      Pupils: Pupils are equal, round, and reactive to light.   Cardiovascular:      Rate and Rhythm: Normal rate and regular rhythm.      Pulses: Normal pulses.      Heart sounds: Normal heart sounds. No murmur heard.  Pulmonary:      Effort: Pulmonary effort is normal.      Breath sounds: Normal breath sounds. No wheezing, rhonchi or rales.   Abdominal:      General: Abdomen is flat. Bowel sounds are normal. There is no distension.      Palpations: Abdomen is soft. There is no mass.      Tenderness: There is no abdominal tenderness.   Musculoskeletal:         General: Swelling present. Normal range of motion.      Cervical back: Normal range of motion and neck supple.      Comments: Tenderness to palpation of bilateral lower extremities.   Skin:     General: Skin is warm and dry.   Neurological:      General: No focal deficit present.      Mental Status: She is alert and oriented to person, place, and time. Mental status is at baseline.       Cranial Nerves: No cranial nerve deficit.           Medical Decision Making       45 MINUTES SPENT BY ME on the date of service doing chart review, history, exam, documentation & further activities per the note.

## 2023-06-09 NOTE — ED NOTES
Bed: ED15  Expected date:   Expected time:   Means of arrival:   Comments:  851 89 Fall on thinners, L leg pain

## 2023-06-09 NOTE — PHARMACY-ADMISSION MEDICATION HISTORY
Pharmacist Admission Medication History    Admission medication history is complete. The information provided in this note is only as accurate as the sources available at the time of the update.    Medication reconciliation/reorder completed by provider prior to medication history? No    Information Source(s): Facility ([facility]/) medication list/MAR via N/A    Pertinent Information:   - Pt admitted overnight, did not inquire about last doses.    Changes made to PTA medication list:    Added: None    Deleted:   o Oxycodone  o Prednisone    Changed: None    Medication Affordability:       Allergies reviewed with patient and updates made in EHR: unable to assess    Medication History Completed By: Bambi Vasquez [facility] 6/9/2023 9:31 AM    Prior to Admission medications    Medication Sig Last Dose Taking? Auth Provider Long Term End Date   acetaminophen (TYLENOL) 500 MG tablet Take 1,000 mg by mouth 3 times daily And once daily prn  Yes Unknown, Entered By History     apixaban ANTICOAGULANT (ELIQUIS) 5 MG tablet Take 5 mg by mouth 2 times daily  Yes Unknown, Entered By History     calcium carbonate (OSCAL 500) 1250 (500 CA) MG TABS Take 1 tablet (1,250 mg) by mouth 2 times daily  Yes Cuba Marie MD     cholecalciferol (VITAMIN D) 1000 UNIT tablet Take 1 tablet (1,000 Units) by mouth daily  Yes Cuba Marie MD     diclofenac (VOLTAREN) 1 % topical gel Apply 4 g topically 4 times daily  Yes Sinan Vincent,      escitalopram (LEXAPRO) 10 MG tablet Take 10 mg by mouth daily  Yes Unknown, Entered By History Yes    levothyroxine (SYNTHROID/LEVOTHROID) 50 MCG tablet Take 50 mcg by mouth daily  Yes Unknown, Entered By History Yes    Lidocaine (LIDOCARE) 4 % Patch Place 2 patches onto the skin every 24 hours To prevent lidocaine toxicity, patient should be patch free for 12 hrs daily.  Patient taking differently: Place 2 patches onto the skin every 24 hours To prevent  lidocaine toxicity, patient should be patch free for 12 hrs daily.  Right hip.  Yes Robin Sanchez MD     magnesium 250 MG tablet Take 1 tablet by mouth daily.  Yes Reported, Patient     metoprolol tartrate (LOPRESSOR) 25 MG tablet Take 25 mg by mouth 2 times daily  Yes Unknown, Entered By History Yes    mirabegron (MYRBETRIQ) 50 MG 24 hr tablet Take 1 tablet (50 mg) by mouth daily  Yes Cuba Marie MD     Multiple Vitamins-Minerals (PRESERVISION AREDS PO) Take 1 tablet by mouth 2 times daily  Yes Reported, Patient     vitamin C (ASCORBIC ACID) 500 MG tablet Take 500 mg by mouth daily  Yes Unknown, Entered By History

## 2023-06-09 NOTE — ED NOTES
[facility]    ED Nurse Handoff Report    ED Chief complaint: Fall      ED Diagnosis:   Final diagnoses:   None       Code Status: DNR / DNI    Allergies:   Allergies   Allergen Reactions     Erythromycin      upsets stomach     Zocor [Simvastatin - High Dose] Rash       Patient Story:  Pt from assisted living facility, got up out of bed and had mechanical fall onto butt and back. Denies hitting head or LOC. C/o pain to both legs, primarily left hip.     Focused Assessment:    Pt is alert and oriented to time, situation, person but slightly confused to place, initially stating she was still at her nursing home. She is hard of hearing and doesn't understand staff at times, such as informing her about IV insertion. In an attempt to ambulate pt, she was unable to stand up from bed complaining of pain to left hip. Pt is very sensitive everywhere, including BP cuff, tourniquet for IVs, etc. Daughter at bedside. Pt is very pleasant but a little anxious. Resting in between cares. States that nothing hurts if she doesn't move.    Labs Ordered and Resulted from Time of ED Arrival to Time of ED Departure - No data to display    CT Pelvis Bone wo Contrast    (Results Pending)         Treatments and/or interventions provided:    none  Medications - No data to display    Patient's response to treatments and/or interventions:    Pt resting in bed with bear hugger, complaining of being cold.     To be done/followed up on inpatient unit:   See any in-patient orders    Does this patient have any cognitive concerns?: Disoriented to place    Activity level - Baseline/Home:    Walker    Activity Level - Current:    Total Care    Patient's Preferred language: English     Needed?: No    Isolation: None  Infection: Not Applicable  Patient tested for COVID 19 prior to admission: NO    Bariatric?: No    Vital Signs:   Vitals:    06/09/23 0033   BP: (!) 151/94   Pulse: 63   Resp: 16   Temp: 97.3  F (36.3  C)    TempSrc: Oral   SpO2: 93%       Cardiac Rhythm:     Was the PSS-3 completed:   Yes  What interventions are required if any?               Family Comments: daughter at bedside. No comments at this time  OBS brochure/video discussed/provided to patient/family: N/A              Name of person given brochure if not patient:               Relationship to patient:     For the majority of the shift this patient's behavior was Green.  Behavioral interventions performed were .    ED NURSE PHONE NUMBER: *[id number]

## 2023-06-09 NOTE — ED PROVIDER NOTES
History     Chief Complaint:  Fall       HPI   Ham Marie is a 89 year old anticoagulated female with a history of a-fib, DVT, hypertension, hyperlipidemia, and PE who presents for evaluation of an unwitnessed fall at her assisted living facility. She does not remember specific details, but she denies hitting her head. She is currently complaining of bilateral leg pain and back pain. She denies arm pain, neck pain, chest pain, or abdominal pain.    Independent Historian:   None - Patient Only    Review of External Notes:   Yes-I reviewed her admission to this hospital on April 1 with left knee pain and inability to ambulate.      Medications:    Eliquis  Lexapro  Synthroid  Lopressor  Myrbetriq  Roxicodone  Deltasone    Past Medical History:    Arthritis  DVT  Heart murmur  A-fib  Hypertension  Hyperlipidemia  PE  Hypothyroidism    Past Surgical History:    Appendectomy  Arthroscopy, knee  Total hysterectomy     Physical Exam     Patient Vitals for the past 24 hrs:   BP Temp Temp src Pulse Resp SpO2   06/09/23 0033 (!) 151/94 97.3  F (36.3  C) Oral 63 16 93 %        Physical Exam  Eye:  Pupils are equal, round, and reactive.  Extraocular movements intact.    ENT:  No rhinorrhea.  Moist mucus membranes.  Normal tongue and tonsil.    Cardiac:  Regular rate and rhythm.  No murmurs, gallops, or rubs.    Pulmonary:  Clear to auscultation bilaterally.  No wheezes, rales, or rhonchi.    Abdomen:  Positive bowel sounds.  Abdomen is soft and non-distended, without focal tenderness.    Musculoskeletal:  Normal movement of all extremities without evidence for deficit. No trauma to the skull. No tenderness to the neck or back. Diffuse discomfort through the left hip on range of motion and with weightbearing.    Skin:  Warm and dry without rashes.    Neurologic:  Non-focal exam without asymmetric weakness or numbness.     Psychiatric:  Normal affect with appropriate interaction with examiner.    Emergency Department  Course     Imaging:  CT Pelvis Bone wo Contrast   Final Result   IMPRESSION:   1.  No acute fracture or malalignment of the bony pelvis or right hip.   2.  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  mild degenerative osteoarthrosis of the left hip joint         Report per radiology    Laboratory:  Labs Ordered and Resulted from Time of ED Arrival to Time of ED Departure   BASIC METABOLIC PANEL - Abnormal       Result Value    Sodium 131 (*)     Potassium 4.6      Chloride 96 (*)     Carbon Dioxide (CO2) 22      Anion Gap 13      Urea Nitrogen 31.4 (*)     Creatinine 0.69      Calcium 9.1      Glucose 112 (*)     GFR Estimate 76     CBC WITH PLATELETS AND DIFFERENTIAL - Abnormal    WBC Count 7.6      RBC Count 4.20      Hemoglobin 13.2      Hematocrit 39.9      MCV 95      MCH 31.4      MCHC 33.1      RDW 16.7 (*)     Platelet Count 260      % Neutrophils 54      % Lymphocytes 33      % Monocytes 10      % Eosinophils 3      % Basophils 0      % Immature Granulocytes 0      NRBCs per 100 WBC 0      Absolute Neutrophils 4.1      Absolute Lymphocytes 2.5      Absolute Monocytes 0.7      Absolute Eosinophils 0.2      Absolute Basophils 0.0      Absolute Immature Granulocytes 0.0      Absolute NRBCs 0.0          Emergency Department Course & Assessments:    Interventions:  Medications - No data to display     Assessments:  0114 I obtained history and examined the patient, as noted above.  0138 I rechecked and updated the patient. The ambulation challenge went poorly and she is complaining of left hip pain.  0331 I rechecked and updated the patient.    Independent Interpretation (X-rays, CTs, rhythm strip):  None    Consultations/Discussion of Management or Tests:  0408 I spoke with Dr. Dixno from the hospitalist service regarding the patient's presentation, findings here in the ED, and plan of care.     Social Determinants of Health affecting care:   None    Disposition:  The patient was  "admitted to the hospital under the care of Dr. Dixon.     Impression & Plan      Medical Decision Making:  This 89-year-old woman presents after suffering an unwitnessed fall today.  She was conscious the entire time, simply noting that \"my legs gave out.\"  She describes landing onto her left hip.  She initially complained to EMS that \"everything hurts.\"  However, she does allow a thorough investigation where she is able to range her extremities including the hips and knees.  However, we attempted to ambulate her, she was unable to stand or bear weight on her left hip secondary to pain where she fell.  She did not strike her head and has no neck or back pain and I do not believe she requires imaging of these areas.  I did not feel that a simple x-ray would likely show a fracture and therefore move forward with a CT of the pelvis to look for signs of rami fracture or occult intertrochanteric fracture.  Fortunately, this study is negative.    On my reassessment, the patient feels comfortable while lying down, though she is unable to ambulate.  With this, she is unable to return to her assisted living.  Therefore, I spoke with Dr. Dixon of the hospitalist service who admit the patient under observation status.    Diagnosis:    ICD-10-CM    1. Hip pain, left  M25.552       2. Unable to ambulate  R26.2              Scribe Disclosure:  I, Kelby Toth, am serving as a scribe at 12:44 AM on 6/9/2023 to document services personally performed by Trierweiler, Chad A, MD based on my observations and the provider's statements to me.      Trierweiler, Chad A, MD  06/09/23 0025    "

## 2023-06-09 NOTE — PROGRESS NOTES
Patient admitted today and by my colleague, patient had a fall at home and mentions generalized weakness and hip pain, CT of the pelvis did not indicate any fracture, PT to evaluate the patient, social work consulted for disposition.  Patient admitted to observation unit.

## 2023-06-09 NOTE — ED NOTES
Pt complaining of pain to bilateral legs now. States she did not hit head or loose consciousness. Says she got out of bed to use the bathroom and had mechanical fall. Denies dizziness. Denies pain to head or neck.

## 2023-06-09 NOTE — PROGRESS NOTES
Care Coordination:    Left message for [facility] to call writer to do assessment. Their number is [phone].    Mallorie Paez RN  BSN, Care Coordinator    [facility]/ Care Transitions          [email or web address]

## 2023-06-09 NOTE — PLAN OF CARE
Goal Outcome Evaluation:    Goal Outcome Evaluation:     -diagnostic tests and consults completed and resulted; partially met  -vital signs normal or at patient baseline; met  Nurse to notify provider when observation goals have been met and patient is ready for discharge.    Alert & oriented to self. [place]. VSS on Rm Air. Resting at baseline. No non-verbal indicators present of pain. Resting comfortably in bed at this time. PT evaluated; patient ambulated to BR w/ asstx1 GB/walker. Had BM at this time. Bruising observed to coccyx area d/t fall prior to admission. Discharge pending medically stable for discharge.

## 2023-06-09 NOTE — ED TRIAGE NOTES
Unwitnessed fall at assisted living. On eliquis. Chronic pain everywhere. Today complaining of right leg pain. Pt mildly confused and very hard of hearing.

## 2023-06-10 NOTE — PROVIDER NOTIFICATION
MD Notification    Notified Person: MD    Notified Person Name:      Notification Date/Time:  6/10/23   9291    Notification Interaction: text    Purpose of Notification: FYI: Pt PTA med review completed. Family requesting medication. Can you please order those. thanks    Orders Received: Order received for meds.    Comments:

## 2023-06-10 NOTE — PROGRESS NOTES
Observation goals  PRIOR TO DISCHARGE        Comments: -diagnostic tests and consults completed and resulted- not met   -vital signs normal or at patient baseline - partially met.   Nurse to notify provider when observation goals have been met and patient is ready for discharge.

## 2023-06-10 NOTE — PROGRESS NOTES
Observation goals  PRIOR TO DISCHARGE        Comments: -diagnostic tests and consults completed and resulted - not met  -vital signs normal or at patient baseline - met    Nurse to notify provider when observation goals have been met and patient is ready for discharge.

## 2023-06-10 NOTE — PROGRESS NOTES
Observation goals  PRIOR TO DISCHARGE        Comments: -diagnostic tests and consults completed and resulted - not met  -vital signs normal or at patient baseline - met    Nurse to notify provider when observation goals have been met and patient is ready for discharge.        Orientation/Cognitive: A&O to self  Observation Goals (Met/ Not Met): not met  Mobility Level/Assist Equipment: A1gb/walker.  Fall Risk (Y/N): Yes  Behavior Concerns: none  Pain Management: denies  Tele/VS/O2: VSS on RA   ABNL Lab/BG: non this shift  Diet: Reg  Bowel/Bladder: Incontinent B/B. Purewick.  Skin Concerns: Bruised at coccyx.  Drains/Devices: PIV SL  Tests/Procedures for next shift: none  Anticipated DC date & active delays: TBD  Patient Stated Goal for Today: none

## 2023-06-10 NOTE — PROGRESS NOTES
diagnostic tests and consults completed and resulted  No  -vital signs normal or at patient baseline Yes

## 2023-06-10 NOTE — PROGRESS NOTES
[facility]    Hospitalist Progress Note    Assessment & Plan   Ham Marie is a 89 year old female with a past medical history of permanent atrial fibrillation, history of DVT and PE on Eliquis, hypertension, overactive bladder, hypothyroidism presents to hospital after a fall.     Unwitnessed fall  Patient had an unwitnessed fall without loss of consciousness or head trauma.  She had hip pain preventing her from ambulating bring her to the hospital. Ct pelvis negative for any acute pathology.  Due to pain the patient was unable to ambulate in the emergency room and is being admitted to observation.  -Pain is improved, PT is recommending that patient can return to prior living arrangement with assistance in moving.  -Care coordinator/social work to arrange discharge planning.  -Discussed with the daughter, she is open to the idea of patient returning to prior living setting if she could get appropriate assistance there.  -Due to the fall which is unwitnessed and concern from daughter about confusion CT head was ordered which did not indicate any new CVA.  -Fourth completion of work-up due to concern of confusion which was noted on 6/9 as per family I have ordered a UA and chest x-ray, otherwise there is no sign of infection.     Hyponatremia  Mild.  Appears to be the patient's baseline.        Chronic medical conditions: Resume PTA meds once med rec is complete  afib-metoprolol, eliquis  Hx DVT and PE-eliquis  htn-metoprolol  OAB-mirabegron  Hypothyroidism-synthroid         DVT Prophylaxis: SCDs  Code Status: No CPR- Do NOT Intubate     50 MINUTES SPENT BY ME on the date of service doing chart review, history, exam, documentation & further activities per the note.  Time was spent on discussing discharge planning with the patient, family and nursing.  Disposition: Expected discharge as early as 6/10  Clinically Significant Risk Factors Present on Admission               # Drug  Induced Coagulation Defect: home medication list includes an anticoagulant medication    # Hypertension: Noted on problem list               Hanh Canas MD  Text Page   (7am to 6pm)    Interval History   Is resting, she knows that she is in the hospital, discussed with the daughter in detail, will need input from her current living facility to see if they would have her back in the facility, social work/care coordinator to coordinate all these, patient is medically stable for discharge.  A UA which was ordered is pending.  UA was ordered for completion of work-up.    -Data reviewed today: I reviewed all new labs and imaging results over the last 24 hours.   Physical Exam     Vital Signs with Ranges  Temp:  [97.8  F (36.6  C)-98.8  F (37.1  C)] 98.4  F (36.9  C)  Pulse:  [67-86] 79  Resp:  [16-18] 16  BP: (119-171)/() 119/89  SpO2:  [95 %-96 %] 96 %  I/O last 3 completed shifts:  In: 300 [P.O.:300]  Out: 300 [Urine:300]    Constitutional: Awake, alert, cooperative, no apparent distress  Respiratory: Clear to auscultation bilaterally, no crackles or wheezing  Cardiovascular: Regular rate and rhythm, normal S1 and S2, and no murmur noted  GI: Normal bowel sounds, soft, non-distended, non-tender  Skin/Integumen: No rashes, no cyanosis, no edema  Neuro : moving all 4 extremities, no focal deficit noted     Medications         Data   Recent Labs   Lab 06/09/23  0204   WBC 7.6   HGB 13.2   MCV 95      *   POTASSIUM 4.6   CHLORIDE 96*   CO2 22   BUN 31.4*   CR 0.69   ANIONGAP 13   APURVA 9.1   *     Recent Labs   Lab 06/09/23  0204   *       Imaging:   Recent Results (from the past 24 hour(s))   CT Head w/o Contrast    Narrative    EXAM: CT HEAD WITHOUT CONTRAST  LOCATION: [facility]  DATE/TIME: 06/10/2023, 9:50 AM CDT    INDICATION: Confusion.  COMPARISON: Brain MR 03/16/2011.  TECHNIQUE: Routine CT Head without IV contrast. Multiplanar reformats. Dose reduction  techniques were used.    FINDINGS:  INTRACRANIAL CONTENTS: No intracranial hemorrhage, extra-axial collection, or mass effect.  No CT evidence of acute infarct. Moderate presumed chronic small vessel ischemic changes. Moderate generalized volume loss. No hydrocephalus.     VISUALIZED ORBITS/SINUSES/MASTOIDS: No intraorbital abnormality. No paranasal sinus mucosal disease. No middle ear or mastoid effusion.    BONES/SOFT TISSUES: No acute abnormality.      Impression    IMPRESSION:  1.  No CT evidence for acute intracranial process.  2.  Brain atrophy and presumed chronic microvascular ischemic changes as above.

## 2023-06-10 NOTE — PROGRESS NOTES
Orientation/Cognitive: AOX2-3. Disoriented to situation.  Observation Goals (Met/ Not Met): not met  Mobility Level/Assist Equipment: A1gb/walker.  Fall Risk (Y/N): Yes  Behavior Concerns: none  Pain Management: denies  Tele/VS/O2: VSS on RA ex htn 152/74. Ortho BP unable to do as pt was up in chair and refused to get up.  ABNL Lab/BG: Na- 131. Urea nitrogen- 31.4.  Diet: Reg  Bowel/Bladder: Incontinent B/B. Purewick.  Skin Concerns: Bruised at coccyx.  Drains/Devices: PIV SL  Tests/Procedures for next shift: none  Anticipated DC date & active delays: TBD. PT recommending home with assist. CC following for discharge back to [facility].  Patient Stated Goal for Today: none      Observation goals  PRIOR TO DISCHARGE        Comments: -diagnostic tests and consults completed and resulted - not met  -vital signs normal or at patient baseline - met  Nurse to notify provider when observation goals have been met and patient is ready for discharge.

## 2023-06-11 NOTE — PLAN OF CARE
Orientation/Cognitive: AOX1 self, forgetful, [place]  Observation Goals (Met/ Not Met): Not Met  Mobility Level/Assist Equipment: x1 g/w  Fall Risk (Y/N): yes  Behavior Concerns: none  Pain Management: denies pain  Tele/VS/O2: vss on room air  ABNL Lab/BG: , UC pending  Diet: regular diet (needs help with ordering), takes meds with applesauce one at a time  Bowel/Bladder: incontinent  Skin Concerns: scattered bruising  Drains/Devices: R PIV SL  Tests/Procedures for next shift: n/a  Anticipated DC date & active delays: awaiting placement, senior living to come and assess patient to see if patient can return at discharge, inpatient hospice consult pending, continue IV antibiotics for UTI  Patient Stated Goal for Today: rest and hear more about discharge plans    Daughter here with patient, walked in [place] today

## 2023-06-11 NOTE — PROGRESS NOTES
Care Coordination:    Called [facility] at [phone] and left message to have nursing staff return call to discuss discharge planning.    Rosanne Muhammad RN, BS  Care Coordinator  [email or web address]

## 2023-06-11 NOTE — PROGRESS NOTES
Care Coordination:    Left message with  [facility] [phone] to do assessment and discuss patient with staff and to see if pt able to return over the weekend.      Rosanne Muhammad RN, BS  Care Coordinator  [email or web address]

## 2023-06-11 NOTE — PROGRESS NOTES
Observation goals  PRIOR TO DISCHARGE        Comments: -diagnostic tests and consults completed and resulted- not met   -vital signs normal or at patient baseline - met  Nurse to notify provider when observation goals have been met and patient is ready for discharge.

## 2023-06-11 NOTE — PROGRESS NOTES
Observation goals       -diagnostic tests and consults completed and resulted  Not Met  -vital signs normal or at patient baseline  Met      Nurse to notify provider when observation goals have been met and patient is ready for discharge.

## 2023-06-11 NOTE — PLAN OF CARE
Orientation/Cognitive: A&OX2-3, redirectable.  Observation Goals (Met/ Not Met): Not met  Mobility Level/Assist Equipment: AX1 with GB and walker. Up in chair for meal.  Fall Risk (Y/N):  Yes  Behavior Concerns: None  Pain Management: Denies  Tele/VS/O2:  VSS on RA  ABNL Lab/BG: Na- 131. UA- abnormal. UC pending.  Diet: Regular  Bowel/Bladder: Continent. Up to bathroom.  Skin Concerns:  Bruising to the buttocks and BLE   Drains/Devices: PIV SL. On IV antibiotic.  Tests/Procedures for next shift:  Hospice consult ordered.  Anticipated DC date & active delays: Plan discharge back to [facility] pending assessment by facility.   Patient Stated Goal for Today:  To rest   Observation goals  PRIOR TO DISCHARGE        Comments: -diagnostic tests and consults completed and resulted - not met  -vital signs normal or at patient baseline - met  Nurse to notify provider when observation goals have been met and patient is ready for discharge.

## 2023-06-11 NOTE — PLAN OF CARE
Goal Outcome Evaluation:  Orientation/Cognitive: A&OX2-3,   Observation Goals (Met/ Not Met): Not met  Mobility Level/Assist Equipment: AX1 with GB/W  Fall Risk (Y/N):  Yes  Behavior Concerns: Impulsive, kept trying to get OOB stating that she going home  Pain Management: Denies  Tele/VS/O2:  VSS on RA  ABNL Lab/BG:No new results  Diet: Regular  Bowel/Bladder: Continent   Skin Concerns:  Bruising to the buttocks and BLE   Drains/Devices: R YIN SL on  IV abx  Tests/Procedures for next shift:  Inpatient hospice consult.  Anticipated DC date & active delays: Discharge pending assessment by shelter

## 2023-06-11 NOTE — PROGRESS NOTES
[facility]    Hospitalist Progress Note    Assessment & Plan   Ham Marie is a 89 year old female with a past medical history of permanent atrial fibrillation, history of DVT and PE on Eliquis, hypertension, overactive bladder, hypothyroidism presents to hospital after a fall.     Unwitnessed fall  Patient had an unwitnessed fall without loss of consciousness or head trauma.  She had hip pain preventing her from ambulating bring her to the hospital. Ct pelvis negative for any acute pathology.  Due to pain the patient was unable to ambulate in the emergency room and is being admitted to observation.  -Pain is improved, PT is recommending that patient can return to prior living arrangement with assistance in moving.  -Care coordinator/social work to arrange discharge planning.  -Discussed with the daughter, she is open to the idea of patient returning to prior living setting if she could get appropriate assistance there.  -Due to the fall which is unwitnessed and concern from daughter about confusion CT head was ordered which did not indicate any new CVA.  Urinary tract infection    --possibly cause of confusion and fall   --will continue rocephin 6/10, culture pending      Hyponatremia  Mild.  Appears to be the patient's baseline.        Chronic medical conditions: Resume PTA meds once med rec is complete  afib-metoprolol, eliquis  Hx DVT and PE-eliquis  htn-metoprolol  OAB-mirabegron  Hypothyroidism-synthroid         DVT Prophylaxis: SCDs  Code Status: No CPR- Do NOT Intubate     50 MINUTES SPENT BY ME on the date of service doing chart review, history, exam, documentation & further activities per the note.  Time was spent on discussing discharge planning with the patient, family and nursing.  Disposition: Expected discharge as early as 6/11  Clinically Significant Risk Factors Present on Admission               # Drug Induced Coagulation Defect: home medication list includes an  anticoagulant medication    # Hypertension: Noted on problem list               Hanh Canas MD  Text Page   (7am to 6pm)    Interval History   Is more alert and resting, she is tolerating diet well, knows that she is in the hospital.  Will update family.    -Data reviewed today: I reviewed all new labs and imaging results over the last 24 hours.   Physical Exam     Vital Signs with Ranges  Temp:  [97.5  F (36.4  C)-98.5  F (36.9  C)] 98.4  F (36.9  C)  Pulse:  [49-81] 60  Resp:  [16-18] 16  BP: (134-160)/(73-99) 146/73  SpO2:  [91 %-96 %] 95 %  I/O last 3 completed shifts:  In: 240 [P.O.:240]  Out: 800 [Urine:800]    Constitutional: Awake, alert, cooperative, no apparent distress  Respiratory: Clear to auscultation bilaterally, no crackles or wheezing  Cardiovascular: Regular rate and rhythm, normal S1 and S2, and no murmur noted  GI: Normal bowel sounds, soft, non-distended, non-tender  Skin/Integumen: No rashes, no cyanosis, no edema  Neuro : moving all 4 extremities, no focal deficit noted     Medications       acetaminophen  1,000 mg Oral TID     apixaban ANTICOAGULANT  5 mg Oral BID     calcium carbonate  600 mg Oral BID     cefTRIAXone  1 g Intravenous Q24H     escitalopram  10 mg Oral Daily     levothyroxine  50 mcg Oral Daily     metoprolol tartrate  25 mg Oral BID     mirabegron  50 mg Oral Daily     multivitamin, therapeutic  1 tablet Oral Daily     vitamin C  500 mg Oral Daily     vitamin D3  1,000 Units Oral Daily       Data   Recent Labs   Lab 06/11/23  0641 06/09/23  0204   WBC 7.4 7.6   HGB 13.4 13.2   MCV 95 95    260   * 131*   POTASSIUM 4.5 4.6   CHLORIDE 96* 96*   CO2 22 22   BUN 16.7 31.4*   CR 0.65 0.69   ANIONGAP 13 13   APURVA 9.3 9.1   * 112*     Recent Labs   Lab 06/11/23  0641 06/09/23  0204   * 112*       Imaging:   No results found for this or any previous visit (from the past 24 hour(s)).

## 2023-06-11 NOTE — PROGRESS NOTES
Observation goals  PRIOR TO DISCHARGE       Comments: -diagnostic tests and consults completed and resulted-  met   -vital signs normal or at patient baseline - met  Nurse to notify provider when observation goals have been met and patient is ready for discharge.

## 2023-06-11 NOTE — CONSULTS
Care Management Initial Consult    General Information  Assessment completed with: Children, Lakshmi, pt daughter  Type of CM/SW Visit: Initial Assessment    Primary Care Provider verified and updated as needed: Yes   Readmission within the last 30 days: no previous admission in last 30 days      Reason for Consult: discharge planning  Advance Care Planning: Advance Care Planning Reviewed: no concerns identified, present on chart          Communication Assessment  Patient's communication style: spoken language (English or Bilingual)    Hearing Difficulty or Deaf: no        Cognitive  Cognitive/Neuro/Behavioral: .WDL except, orientation  Level of Consciousness: confused  Arousal Level: opens eyes spontaneously  Orientation: disoriented to, place, time, situation  Mood/Behavior: calm, cooperative  Best Language: 0 - No aphasia  Speech: clear, spontaneous    Living Environment:   People in home: facility resident     Current living Arrangements: assisted living  Name of Facility: [facility] [phone]   Able to return to prior arrangements:         Family/Social Support:  Care provided by: other (see comments) (staff)  Provides care for: no one, unable/limited ability to care for self  Marital Status:   Children          Description of Support System: Supportive, Involved         Current Resources:   Patient receiving home care services: No     Community Resources: None  Equipment currently used at home: grab bar, tub/shower, walker, standard  Supplies currently used at home:      Employment/Financial:  Employment Status: retired        Financial Concerns: No concerns identified           Does the patient's insurance plan have a 3 day qualifying hospital stay waiver?  No    Lifestyle & Psychosocial Needs:  Social Determinants of Health     Tobacco Use: Low Risk  (1/24/2023)    Patient History      Smoking Tobacco Use: Never      Smokeless Tobacco Use: Never      Passive Exposure: Not on file   Alcohol Use:  Not on file   Financial Resource Strain: Not on file   Food Insecurity: Not on file   Transportation Needs: Not on file   Physical Activity: Not on file   Stress: Not on file   Social Connections: Not on file   Intimate Partner Violence: Not on file   Depression: Not at risk (1/15/2019)    PHQ-2      PHQ-2 Score: 0   Housing Stability: Not on file       Functional Status:  Prior to admission patient needed assistance:   Dependent ADLs:: Ambulation-walker, Bathing, Dressing, Grooming, Toileting  Dependent IADLs:: Cleaning, Cooking, Laundry, Shopping, Meal Preparation, Medication Management, Money Management, Transportation, Incontinence       Mental Health Status:  Mental Health Status: No Current Concerns       Chemical Dependency Status:  Chemical Dependency Status: No Current Concerns             Values/Beliefs:  Spiritual, Cultural Beliefs, Latter-day Practices, Values that affect care: no               Additional Information:  Met with patient daughter, Lakshmi, in pt room to discuss pt prior level of function at [facility] and review [facility].  Daughter shared that pt has assistance with AM/PM cares, medication administration, meals, escorts to meals, toilet assist.  Discussed that CC has left messages with [facility] [phone] to discuss pt and discharge planning.  Daughter aware that PT saw pt on Friday and recommending return to [facility] with home care PT.  Pt currently does not have home care but recently had [facility] and that is her preference.    Daughter also concerned pt is more weak than baseline and would like PT to eval again.  CC discussed with PT and they have put pt on the schedule for tomorrow.  Discussed with Lakshmi that if PT would change recommendations to TCU, pt is observation and that would be private pay and daughter voiced understanding.    CC to call [facility] at [phone] tomorrow to discuss pt returning after PT has evaluated and given current recommendations.    Rosanne Muhammad RN, BS  Care  Coordinator  [email or web address]

## 2023-06-12 NOTE — PLAN OF CARE
Physical Therapy Discharge Summary    Reason for therapy discharge:    Discharged to home with home therapy.    Progress towards therapy goal(s). See goals on Care Plan in [facility] electronic health record for goal details.  Goals partially met.  Barriers to achieving goals:   discharge from facility.    Therapy recommendation(s):    Continued therapy is recommended.  Rationale/Recommendations: Pt either at or near baseline mobility. Pt lives in [place]. Pt presenting after fall when she did not call for assistance to the bathroom. Typically has been A x1 for mobility w/ FWW and facility per Pt report. Pt currently A x1 w/ mobility w/ FWW. Denied pain w/ mobility to transfer and back. Anticipate when medically ready Pt can return to [place] w/ 24/7 assist for mobility.  PT Brief overview of current status: SBA for bed mobility, CGA for transfers and ambulation w/ FWW      Recommendation above provided by last treating therapist.

## 2023-06-12 NOTE — PLAN OF CARE
Goal Outcome Evaluation:     Observation goals  PRIOR TO DISCHARGE        Comments: -diagnostic tests and consults completed and resulted - not met  -vital signs normal or at patient baseline - not met  Nurse to notify provider when observation goals have been met and patient is ready for discharge.

## 2023-06-12 NOTE — PROGRESS NOTES
Orientation/Cognitive: Alert to self. Confused.  Observation Goals (Met/ Not Met): Not Met  Mobility Level/Assist Equipment: A-1 GB/walker  Fall Risk (Y/N): Y  Behavior Concerns: Pt agitated and confused at times. IV was pulled out.  Pain Management: Scheduled tylenol  Tele/VS/O2: VSS on RA, except b/p elevated to 140's systolic  ABNL Lab/BG: Na- 131, Cl- 96, BG- 118  Diet: Reg  Bowel/Bladder: Incont. B&B  Skin Concerns: Scattered bruising  Drains/Devices: None  Tests/Procedures for next shift: Pending PT re-eval 6/12/23  Anticipated DC date & active delays: Pending [facility] to eval Pt.  Patient Stated Goal for Today: None

## 2023-06-12 NOTE — PROGRESS NOTES
Care Management Follow Up    Length of Stay (days): 0    Expected Discharge Date: 06/13/2023     Concerns to be Addressed:       Patient plan of care discussed at interdisciplinary rounds: Yes    Anticipated Discharge Disposition: Assisted Living, Home Care     Anticipated Discharge Services:  Home PT  Anticipated Discharge DME:  none    Patient/family educated on Medicare website which has current facility and service quality ratings:    Education Provided on the Discharge Plan:    Patient/Family in Agreement with the Plan: yes    Referrals Placed by CM/SW: none   Private pay costs discussed: Not applicable    Additional Information:  Left message for [facility] with Nurse on duty and DON for patient returning. MD would like patient to return today.  Writer received a call back from [facility], spoke to [place]. She told writer that patient can come back anytime but will have the nurse call writer. Writer went over PT recommendation and that PT is seeing again.  Writer spoke to daughter, Lakshmi. She is concerned that patient is crabby and it is not explained. Patient appears near baseline as she is arguing with nurse about her pills.   Writer set up  [employer] wheelchair ride for between 2:45-3:20pm.  Placed call to [facility] for fax number.      Mallorie Paez RN

## 2023-06-12 NOTE — PLAN OF CARE
Orientation/Cognitive: A&Ox1 oriented to self only  Observation Goals (Met/ Not Met): Met  Mobility Level/Assist Equipment: Ax1, gb, walker  Fall Risk (Y/N): Y  Behavior Concerns: WDL  Pain Management: Scheduled tylenol  Tele/VS/O2: VSS  ABNL Lab/BG: WDL  Diet: tolerating a regular diet, complained that food isn't very good  Bowel/Bladder: WDL, needed to be straight cathed overnight but voided today  Skin Concerns: redness on sacrum, mepilex in place  Drains/Devices: PIV removed last night  Tests/Procedures for next shift: none  Anticipated DC date & active delays: today  Patient Stated Goal for Today: [place]    BP (!) 158/79 (BP Location: Right arm)   Pulse 62   Temp 97.6  F (36.4  C) (Oral)   Resp 17   Wt 69.7 kg (153 lb 11.2 oz)   SpO2 92%   BMI 29.04 kg/m

## 2023-06-12 NOTE — UTILIZATION REVIEW
"  Admission Status; Secondary Review Determination         Under the authority of the Utilization Management Committee, the utilization review process indicated a secondary review on the above patient.  The review outcome is based on review of the medical records, discussions with staff, and applying clinical experience noted on the date of the review.        ()      Inpatient Status Appropriate - This patient's medical care is consistent with medical management for inpatient care and reasonable inpatient medical practice.      (X) Observation Status Appropriate - This patient does not meet hospital inpatient criteria and is placed in observation status. If this patient's primary payer is Medicare and was admitted as an inpatient, Condition Code 44 should be used and patient status changed to \"observation\".   () Admission Status NOT Appropriate - This patient's medical care is not consistent with medical management for Inpatient or Observation Status.          RATIONALE FOR DETERMINATION     89 year old female with a past medical history of permanent atrial fibrillation, history of DVT and PE on Eliquis, hypertension, overactive bladder, hypothyroidism presented to hospital after a fall.  Patient had hip pain with difficulty in ambulation.  Initial imaging showed no fracture.  Patient was placed in Observation status for pain management and therapies.  She was on an IV antibiotic for possible UTI.  Her IV came out yesterday and will not be restarted.  She is appropriate for observation status.  I spoke to Dr. Canas.    The severity of illness, intensity of service provided, expected LOS and risk for adverse outcome make the care complex, high risk and appropriate for hospital admission.        The information on this document is developed by the utilization review team in order for the business office to ensure compliance.  This only denotes the appropriateness of proper admission status and does not reflect the " quality of care rendered.         The definitions of Inpatient Status and Observation Status used in making the determination above are those provided in the CMS Coverage Manual, Chapter 1 and Chapter 6, section 70.4.      Sincerely,     Wilmer Stanley MD  Physician Advisor  Utilization Review/ Case Management  [facility].

## 2023-06-12 NOTE — PLAN OF CARE
BP (!) 145/71 (BP Location: Right arm)   Pulse 63   Temp 98.6  F (37  C) (Oral)   Resp 17   Wt 69.7 kg (153 lb 11.2 oz)   SpO2 97%   BMI 29.04 kg/m    Patient's condition and vital Signs are stable/WNL.  Discharge instructions reviewed with patient and questions answered. Patient verbalizes understanding. IV removed. Pain under control.  Patient is tolerating regular diet and denies any N/V. Patient to be discharged to [facility] via [facility] w/c transport. Patient has all belongings. AVS reviewed with dtgalina Martins over the phone. Oxy prescription sent in her packet.

## 2023-06-12 NOTE — PROGRESS NOTES
Observation goals  PRIOR TO DISCHARGE        Comments: -diagnostic tests and consults completed and resulted Not Met: PT to reassess today  -vital signs normal or at patient baseline Met   Nurse to notify provider when observation goals have been met and patient is ready for discharge.     BP (!) 158/79 (BP Location: Right arm)   Pulse 62   Temp 97.6  F (36.4  C) (Oral)   Resp 17   Wt 69.7 kg (153 lb 11.2 oz)   SpO2 92%   BMI 29.04 kg/m

## 2023-06-12 NOTE — DISCHARGE SUMMARY
[facility]    Discharge Summary  Hospitalist    Date of Admission:  6/9/2023  Date of Discharge:  6/12/2023  Discharging Provider: Hanh Canas MD    Discharge Diagnoses   Hip pain left    History of Present Illness   Please review admission history and physical.    Hospital Course   Ham Marie was admitted on 6/9/2023.  The following problems were addressed during her hospitalization:    Principal Problem:    Unable to ambulate  Active Problems:    Hip pain, left  Ham Marie is a 89 year old female with a past medical history of permanent atrial fibrillation, history of DVT and PE on Eliquis, hypertension, overactive bladder, hypothyroidism presents to hospital after a fall.     Unwitnessed fall  Patient had an unwitnessed fall without loss of consciousness or head trauma.  She had hip pain preventing her from ambulating bring her to the hospital. Ct pelvis negative for any acute pathology.  Due to pain the patient was unable to ambulate in the emergency room and is being admitted to observation.  Pain is improved, PT is recommending that patient can return to prior living arrangement with assistance in moving.  Care coordinator discussed with her current facility and they are agreeable to have the patient back, home care arranged.  Patient's daughter is also open to the idea of hospice, to patient to enroll in hospice once she reaches her current living setting if is appropriate.  Due to unwitnessed fall CT head was obtained which did not indicate any new changes, I also did work-up including chest x-ray and UA, UA was abnormal so she received 2 doses of IV Rocephin, urine cultures were negative so antibiotics were discontinued.     Hyponatremia  Mild.  Appears to be the patient's baseline.        Chronic medical conditions: Resume PTA meds once med rec is complete  afib-metoprolol, eliquis  Hx DVT and  PE-eliquis  htn-metoprolol  OAB-mirabegron  Hypothyroidism-synthroid           Hanh Canas MD    Significant Results and Procedures       Pending Results     Unresulted Labs Ordered in the Past 30 Days of this Admission     No orders found from 5/10/2023 to 6/10/2023.          Code Status   DNR / DNI       Primary Care Physician   Eduar Salinas    Physical Exam   Temp: 97.6  F (36.4  C) Temp src: Oral BP: (!) 158/79 Pulse: 62   Resp: 17 SpO2: 92 % O2 Device: None (Room air)    Vitals:    06/10/23 1718   Weight: 69.7 kg (153 lb 11.2 oz)     Vital Signs with Ranges  Temp:  [97.4  F (36.3  C)-98.2  F (36.8  C)] 97.6  F (36.4  C)  Pulse:  [62-75] 62  Resp:  [16-18] 17  BP: (132-158)/(67-95) 158/79  SpO2:  [91 %-94 %] 92 %  I/O last 3 completed shifts:  In: 180 [P.O.:180]  Out: 600 [Urine:600]    The patient was examined on the day of discharge.    Discharge Disposition   Discharged to assisted living  Condition at discharge: Stable    Consultations This Hospital Stay   PHYSICAL THERAPY ADULT IP CONSULT  OCCUPATIONAL THERAPY ADULT IP CONSULT  CARE MANAGEMENT / SOCIAL WORK IP CONSULT  [facility] INPATIENT HOSPICE ADULT CONSULT  VASCULAR ACCESS ADULT IP CONSULT    Time Spent on this Encounter   I, Hanh Canas MD, personally saw the patient today and spent greater than 30 minutes discharging this patient.    Discharge Orders      Home Care Referral      Reason for your hospital stay    Hip pain     Follow-up and recommended labs and tests     Please follow up with hospice service in the facility to see if patient qualifies for hospice.     Activity    Your activity upon discharge: activity as tolerated     Diet    Follow this diet upon discharge: Orders Placed This Encounter      Regular Diet Adult     Discharge Medications   Current Discharge Medication List      START taking these medications    Details   oxyCODONE (ROXICODONE) 5 MG tablet Take 0.5 tablets (2.5 mg) by mouth every 4 hours as needed for breakthrough  pain  Qty: 10 tablet, Refills: 0    Associated Diagnoses: Hip pain, left         CONTINUE these medications which have NOT CHANGED    Details   acetaminophen (TYLENOL) 500 MG tablet Take 1,000 mg by mouth 3 times daily And once daily prn      apixaban ANTICOAGULANT (ELIQUIS) 5 MG tablet Take 5 mg by mouth 2 times daily      calcium carbonate (OSCAL 500) 1250 (500 CA) MG TABS Take 1 tablet (1,250 mg) by mouth 2 times daily    Associated Diagnoses: Hypocalcemia      cholecalciferol (VITAMIN D) 1000 UNIT tablet Take 1 tablet (1,000 Units) by mouth daily  Qty: 100 tablet, Refills: 3    Associated Diagnoses: Adjustment disorder with anxious mood      diclofenac (VOLTAREN) 1 % topical gel Apply 4 g topically 4 times daily    Associated Diagnoses: Primary osteoarthritis of both knees      escitalopram (LEXAPRO) 10 MG tablet Take 10 mg by mouth daily      levothyroxine (SYNTHROID/LEVOTHROID) 50 MCG tablet Take 50 mcg by mouth daily      Lidocaine (LIDOCARE) 4 % Patch Place 2 patches onto the skin every 24 hours To prevent lidocaine toxicity, patient should be patch free for 12 hrs daily.    Associated Diagnoses: Primary osteoarthritis of right hip      magnesium 250 MG tablet Take 1 tablet by mouth daily.      metoprolol tartrate (LOPRESSOR) 25 MG tablet Take 25 mg by mouth 2 times daily      mirabegron (MYRBETRIQ) 50 MG 24 hr tablet Take 1 tablet (50 mg) by mouth daily  Qty: 90 tablet, Refills: 3    Associated Diagnoses: Urinary frequency      Multiple Vitamins-Minerals (PRESERVISION AREDS PO) Take 1 tablet by mouth 2 times daily      vitamin C (ASCORBIC ACID) 500 MG tablet Take 500 mg by mouth daily           Allergies   Allergies   Allergen Reactions     Erythromycin      upsets stomach     Zocor [Simvastatin - High Dose] Rash     Data   Most Recent 3 CBC's:Recent Labs   Lab Test 06/11/23  0641 06/09/23  0204 04/01/23  1620   WBC 7.4 7.6 8.7   HGB 13.4 13.2 12.5   MCV 95 95 97    260 419      Most Recent 3  BMP's:  Recent Labs   Lab Test 06/11/23  0641 06/09/23  0204 04/06/23  0633 04/01/23  1620   * 131*  --  132*   POTASSIUM 4.5 4.6  --  4.4   CHLORIDE 96* 96*  --  95*   CO2 22 22  --  27   BUN 16.7 31.4*  --  27.0*   CR 0.65 0.69  --  0.66   ANIONGAP 13 13  --  10   APURVA 9.3 9.1  --  9.8*   * 112* 111* 118*     Most Recent 2 LFT's:  Recent Labs   Lab Test 12/24/22  2321 02/02/17  1532   AST 20 21   ALT 15 18   ALKPHOS 74 68   BILITOTAL 1.0 0.5     Most Recent INR's and Anticoagulation Dosing History:  Anticoagulation Dose History         Latest Ref Rng & Units 1/19/2017 2/2/2017 3/16/2017 6/5/2017   Recent Dosing and Labs   INR 0.85 - 1.15 4.2   2.8   3.3   3.0         7/31/2017 6/3/2019 12/24/2022   Recent Dosing and Labs   INR 3.1   2.25   1.40                Most Recent 3 Troponin's:  Recent Labs   Lab Test 06/03/19  1446   TROPI <0.015     Most Recent Cholesterol Panel:No lab results found.  Most Recent 6 Bacteria Isolates From Any Culture (See EPIC Reports for Culture Details):  Recent Labs   Lab Test 06/13/16  1236 12/05/15  1628   CULT 50,000 to 100,000 colonies/mL Escherichia coli* >100,000 colonies/mL Escherichia coli*     Most Recent TSH, T4 and A1c Labs:  Recent Labs   Lab Test 12/24/22  2321   TSH 8.31*   T4 1.00     Results for orders placed or performed during the hospital encounter of 06/09/23   CT Pelvis Bone wo Contrast    Narrative    EXAM: CT PELVIS BONE WO CONTRAST  LOCATION: [facility]  DATE/TIME: 6/9/2023 2:53 AM CDT    INDICATION: left hip pain after trauma  COMPARISON: None.  TECHNIQUE: CT scan of the pelvis was performed without IV contrast. Multiplanar reformats were obtained. Dose reduction techniques were used.  CONTRAST: None.    FINDINGS:    PELVIS ORGANS: The included portions of the small large bowel are unremarkable. Dense atherosclerotic calcifications of the iliac and femoral vessels are noted.    MUSCULOSKELETAL: The bones are  well-mineralized. Moderate degenerative osteoarthrosis of the right hip is noted with subchondral cystic changes seen in the femoral head and acetabula as well as osteophytic spurring. Mild degenerative osteoarthrosis of   the left hip joint is noted. There is mild SI joint degeneration seen bilaterally as well as mild osteitis pubis. No acute fracture or malalignment of the hips or bony pelvis are identified.         Impression    IMPRESSION:  1.  No acute fracture or malalignment of the bony pelvis or right hip.  2.  Moderate degenerative osteoarthrosis of the right hip joint  3.  mild degenerative osteoarthrosis of the left hip joint   XR Tibia and Fibula Left 2 Views    Narrative    EXAM: XR TIBIA AND FIBULA LEFT 2 VIEWS  LOCATION: [facility]  DATE/TIME: 6/9/2023 5:51 AM CDT    INDICATION: Pain after fall  COMPARISON: None.      Impression    IMPRESSION: No acute fracture or dislocation. No joint effusion. Vascular calcifications. Knee osteoarthrosis. Plantar calcaneal spur.   XR Tibia and Fibula Right 2 Views    Narrative    EXAM: XR TIBIA AND FIBULA RIGHT 2 VIEWS  LOCATION: [facility]  DATE/TIME: 6/9/2023 5:52 AM CDT    INDICATION: Pain after fall  COMPARISON: None.      Impression    IMPRESSION: No acute fracture or dislocation. No joint effusion. Vascular calcifications. Knee osteoarthrosis. Degenerative changes talotibial joint.   CT Head w/o Contrast    Narrative    EXAM: CT HEAD WITHOUT CONTRAST  LOCATION: [facility]  DATE/TIME: 06/10/2023, 9:50 AM CDT    INDICATION: Confusion.  COMPARISON: Brain MR 03/16/2011.  TECHNIQUE: Routine CT Head without IV contrast. Multiplanar reformats. Dose reduction techniques were used.    FINDINGS:  INTRACRANIAL CONTENTS: No intracranial hemorrhage, extra-axial collection, or mass effect.  No CT evidence of acute infarct. Moderate presumed chronic small vessel ischemic changes. Moderate  generalized volume loss. No hydrocephalus.     VISUALIZED ORBITS/SINUSES/MASTOIDS: No intraorbital abnormality. No paranasal sinus mucosal disease. No middle ear or mastoid effusion.    BONES/SOFT TISSUES: No acute abnormality.      Impression    IMPRESSION:  1.  No CT evidence for acute intracranial process.  2.  Brain atrophy and presumed chronic microvascular ischemic changes as above.       XR Chest 1 View    Narrative    EXAM: XR CHEST 1 VIEW  LOCATION: [facility]  DATE/TIME: 6/10/2023 9:47 AM CDT    INDICATION: Shortness of breath.  COMPARISON: 12/25/2022.      Impression    IMPRESSION: Mildly hypoexpanded lungs. A few minimal streaky pulmonary opacities are most likely atelectasis. Remaining lungs are clear. Mildly enlarged cardiac silhouette, exaggerated by portable technique. No definitive pulmonary edema or significant   pleural effusion. No pneumothorax.

## 2023-06-12 NOTE — PROVIDER NOTIFICATION
"MD Notification    Notified Person: MD    Notified Person Name:  Missael    Notification Date/Time: 06/12/23 7:23 AM     Notification Interaction: Press[employer] messaging    Purpose of Notification: \"Pt apparently pulled out her IV yesterday evening, is a very hard stick. Still has IV abx ordered for today at 1700, can she be switched to oral abx?\"    Orders Received: verbal okay to not replace IV, will change abx once provider rounds     Comments:    "

## 2023-06-12 NOTE — PROGRESS NOTES
Observation goals  PRIOR TO DISCHARGE        Comments: -diagnostic tests and consults completed and resulted In Progress: PT currently with patient  -vital signs normal or at patient baseline Met   Nurse to notify provider when observation goals have been met and patient is ready for discharge.     BP (!) 158/79 (BP Location: Right arm)   Pulse 62   Temp 97.6  F (36.4  C) (Oral)   Resp 17   Wt 69.7 kg (153 lb 11.2 oz)   SpO2 92%   BMI 29.04 kg/m       Home

## 2023-06-12 NOTE — PROGRESS NOTES
Orientation/Cognitive: Alert to self. Presently confused  Observation Goals (Met/ Not Met): Not Met  Mobility Level/Assist Equipment: Not OOB  Fall Risk (Y/N): Y  Behavior Concerns: Agitated earlier on shift, able to follow commands.   Pain Management: No indication/denies pain  Tele/VS/O2: VSS on RA. Tele SR  ABNL Lab/BG: See chart  Diet: Reg  Bowel/Bladder: incont bpth, had retention, bladder sacan-678mL, straight cath 600mL  Skin Concerns: Scattered bruising, BLE swelling  Drains/Devices: None, Pulled IV earlier shift.   Tests/Procedures for next shift: Pending PT re-eval 6/12/23/   Anticipated DC date & active delays: TBD  Patient Stated Goal for Today: Sleep rest

## 2023-06-13 NOTE — PROGRESS NOTES
Clinic Care Coordination Contact  [facility]: Post-Discharge Note  SITUATION                                                      Admission:    Admission Date: 06/09/23   Reason for Admission: Hip pain, left    Unable to ambulate  Discharge:   Discharge Date: 06/12/23  Discharge Diagnosis: Hip pain, left    Unable to ambulate    BACKGROUND                                                      Per hospital discharge summary and inpatient provider notes:  Ham Marie was admitted on 6/9/2023.  The following problems were addressed during her hospitalization:     Principal Problem:    Unable to ambulate  Active Problems:    Hip pain, left  Ham Marie is a 89 year old female with a past medical history of permanent atrial fibrillation, history of DVT and PE on Eliquis, hypertension, overactive bladder, hypothyroidism presents to hospital after a fall.     Unwitnessed fall  Patient had an unwitnessed fall without loss of consciousness or head trauma.  She had hip pain preventing her from ambulating bring her to the hospital. Ct pelvis negative for any acute pathology.  Due to pain the patient was unable to ambulate in the emergency room and is being admitted to observation.  Pain is improved, PT is recommending that patient can return to prior living arrangement with assistance in moving.  Care coordinator discussed with her current facility and they are agreeable to have the patient back, home care arranged.  Patient's daughter is also open to the idea of hospice, to patient to enroll in hospice once she reaches her current living setting if is appropriate.  Due to unwitnessed fall CT head was obtained which did not indicate any new changes, I also did work-up including chest x-ray and UA, UA was abnormal so she received 2 doses of IV Rocephin, urine cultures were negative so antibiotics were discontinued.     Hyponatremia  Mild.  Appears to be the patient's baseline.        Chronic medical  conditions: Resume PTA meds once med rec is complete  afib-metoprolol, eliquis  Hx DVT and PE-eliquis  htn-metoprolol  OAB-mirabegron  Hypothyroidism-synthroid    ASSESSMENT           Discharge Assessment  How are you doing now that you are home?: Caller spoke with facility nurse who reports Pt is unwell and they will most likely send her back to the hospital or the care center. They plan on making plans with Pt's daughter soon.  How are your symptoms? (Red Flag symptoms escalate to triage hotline per guidelines): Worsening  Do you feel your condition is stable enough to be safe at home until your provider visit?: No (see comment)  Does the patient have their discharge instructions? : Yes  Does the patient have questions regarding their discharge instructions? : No  Were you started on any new medications or were there changes to any of your previous medications? : Yes  Does the patient have all of their medications?: Yes  Do you have questions regarding any of your medications? : No  Do you have all of your needed medical supplies or equipment (DME)?  (i.e. oxygen tank, CPAP, cane, etc.): Yes  Discharge follow-up appointment scheduled within 14 calendar days? : No              PLAN                                                      Outpatient Plan:     Please follow up with hospice service in the facility to see if patient qualifies for hospice.         No future appointments.      For any urgent concerns, please contact our 24 hour nurse triage line: [phone] ([phone])         BROCK Hoffman  Connected Care Resource Center  [facility]     *Connected Care Resource Team does NOT follow patient ongoing. Referrals are identified based on internal discharge reports and the outreach is to ensure patient has an understanding of their discharge instructions.

## 2023-06-13 NOTE — PLAN OF CARE
Occupational Therapy Discharge Summary    Reason for therapy discharge:    Discharged to home with home therapy.    Progress towards therapy goal(s). See goals on Care Plan in [facility] electronic health record for goal details.  Goals partially met.  Barriers to achieving goals:   discharge from facility.    Therapy recommendation(s):    Continued therapy is recommended.  Rationale/Recommendations:  to increase indep with functional mobility and self cares.